# Patient Record
Sex: FEMALE | Race: BLACK OR AFRICAN AMERICAN | Employment: OTHER | URBAN - METROPOLITAN AREA
[De-identification: names, ages, dates, MRNs, and addresses within clinical notes are randomized per-mention and may not be internally consistent; named-entity substitution may affect disease eponyms.]

---

## 2020-09-04 ENCOUNTER — TRANSFERRED RECORDS (OUTPATIENT)
Dept: HEALTH INFORMATION MANAGEMENT | Facility: CLINIC | Age: 80
End: 2020-09-04

## 2020-09-05 ENCOUNTER — TRANSFERRED RECORDS (OUTPATIENT)
Dept: HEALTH INFORMATION MANAGEMENT | Facility: CLINIC | Age: 80
End: 2020-09-05

## 2020-09-06 ENCOUNTER — TRANSFERRED RECORDS (OUTPATIENT)
Dept: HEALTH INFORMATION MANAGEMENT | Facility: CLINIC | Age: 80
End: 2020-09-06

## 2020-09-07 ENCOUNTER — TRANSFERRED RECORDS (OUTPATIENT)
Dept: HEALTH INFORMATION MANAGEMENT | Facility: CLINIC | Age: 80
End: 2020-09-07

## 2020-09-08 ENCOUNTER — TRANSFERRED RECORDS (OUTPATIENT)
Dept: HEALTH INFORMATION MANAGEMENT | Facility: CLINIC | Age: 80
End: 2020-09-08

## 2020-09-12 ENCOUNTER — TRANSFERRED RECORDS (OUTPATIENT)
Dept: HEALTH INFORMATION MANAGEMENT | Facility: CLINIC | Age: 80
End: 2020-09-12

## 2020-09-13 ENCOUNTER — TRANSFERRED RECORDS (OUTPATIENT)
Dept: HEALTH INFORMATION MANAGEMENT | Facility: CLINIC | Age: 80
End: 2020-09-13

## 2020-10-07 ENCOUNTER — OFFICE VISIT (OUTPATIENT)
Dept: FAMILY MEDICINE | Facility: CLINIC | Age: 80
End: 2020-10-07
Payer: MEDICARE

## 2020-10-07 VITALS
SYSTOLIC BLOOD PRESSURE: 148 MMHG | TEMPERATURE: 98.4 F | HEART RATE: 80 BPM | RESPIRATION RATE: 15 BRPM | OXYGEN SATURATION: 98 % | DIASTOLIC BLOOD PRESSURE: 72 MMHG

## 2020-10-07 DIAGNOSIS — Z13.220 SCREENING FOR LIPID DISORDERS: ICD-10-CM

## 2020-10-07 DIAGNOSIS — R26.81 GAIT INSTABILITY: ICD-10-CM

## 2020-10-07 DIAGNOSIS — E11.59 TYPE 2 DIABETES MELLITUS WITH OTHER CIRCULATORY COMPLICATION, UNSPECIFIED WHETHER LONG TERM INSULIN USE (H): ICD-10-CM

## 2020-10-07 DIAGNOSIS — Z23 NEED FOR VACCINATION: Primary | ICD-10-CM

## 2020-10-07 PROBLEM — Z95.0 CARDIAC PACEMAKER IN SITU: Status: ACTIVE | Noted: 2020-10-07

## 2020-10-07 PROBLEM — R00.1 BRADYCARDIA: Status: ACTIVE | Noted: 2020-10-07

## 2020-10-07 LAB
BUN SERPL-MCNC: 14 MG/DL (ref 7–30)
CALCIUM SERPL-MCNC: 9.1 MG/DL (ref 8.5–10.4)
CHLORIDE SERPLBLD-SCNC: 102 MMOL/L (ref 94–109)
CHOLEST SERPL-MCNC: 203 MG/DL (ref 0–200)
CHOLEST/HDLC SERPL: 3.8 {RATIO} (ref 0–5)
CO2 SERPL-SCNC: 28 MMOL/L (ref 20–32)
CREAT SERPL-MCNC: 1.1 MG/DL (ref 0.6–1.3)
CREAT UR-MCNC: 172 MG/DL
EGFR CALCULATED (BLACK REFERENCE): 61.8
EGFR CALCULATED (NON BLACK REFERENCE): 51.1
FASTING SPECIMEN: YES
GLUCOSE SERPL-MCNC: 77 MG/DL (ref 60–99)
HBA1C MFR BLD: 6.8 % (ref 4.1–5.7)
HDLC SERPL-MCNC: 53 MG/DL
LDLC SERPL CALC-MCNC: 110 MG/DL (ref 0–129)
MICROALBUMIN UR-MCNC: 234 MG/L
MICROALBUMIN/CREAT UR: 136.05 MG/G CR (ref 0–25)
POTASSIUM SERPL-SCNC: 4.3 MMOL/L (ref 3.4–5.3)
SODIUM SERPL-SCNC: 142 MMOL/L (ref 137.3–146.3)
TRIGL SERPL-MCNC: 198 MG/DL (ref 0–150)
VLDL-CHOLESTEROL: 40 (ref 7–32)

## 2020-10-07 RX ORDER — SIMVASTATIN 40 MG
40 TABLET ORAL AT BEDTIME
COMMUNITY
End: 2020-12-02

## 2020-10-07 RX ORDER — ALENDRONATE SODIUM 70 MG/1
70 TABLET ORAL
COMMUNITY
End: 2021-03-16

## 2020-10-07 RX ORDER — CARVEDILOL 12.5 MG/1
12.5 TABLET ORAL 2 TIMES DAILY WITH MEALS
COMMUNITY
End: 2020-10-29 | Stop reason: ALTCHOICE

## 2020-10-07 RX ORDER — FAMOTIDINE 20 MG/1
20 TABLET, FILM COATED ORAL 2 TIMES DAILY
COMMUNITY
End: 2022-03-28

## 2020-10-07 RX ORDER — INSULIN LISPRO 100 [IU]/ML
INJECTION, SOLUTION INTRAVENOUS; SUBCUTANEOUS
COMMUNITY
End: 2020-10-13 | Stop reason: ALTCHOICE

## 2020-10-07 RX ORDER — GLIMEPIRIDE 2 MG/1
2 TABLET ORAL
COMMUNITY
End: 2020-10-13 | Stop reason: ALTCHOICE

## 2020-10-07 RX ORDER — AMLODIPINE BESYLATE 10 MG/1
10 TABLET ORAL DAILY
COMMUNITY
End: 2020-12-02

## 2020-10-07 SDOH — HEALTH STABILITY: MENTAL HEALTH: HOW MANY STANDARD DRINKS CONTAINING ALCOHOL DO YOU HAVE ON A TYPICAL DAY?: NOT ASKED

## 2020-10-07 SDOH — HEALTH STABILITY: MENTAL HEALTH: HOW OFTEN DO YOU HAVE A DRINK CONTAINING ALCOHOL?: NEVER

## 2020-10-07 SDOH — HEALTH STABILITY: MENTAL HEALTH: HOW OFTEN DO YOU HAVE 6 OR MORE DRINKS ON ONE OCCASION?: NEVER

## 2020-10-07 NOTE — NURSING NOTE
Prior to immunization administration, verified patients identity using patient s name and date of birth. Please see Immunization Activity for additional information.     Screening Questionnaire for Adult Immunization    Are you sick today?   No   Do you have allergies to medications, food, a vaccine component or latex?   No   Have you ever had a serious reaction after receiving a vaccination?   No   Do you have a long-term health problem with heart, lung, kidney, or metabolic disease (e.g., diabetes), asthma, a blood disorder, no spleen, complement component deficiency, a cochlear implant, or a spinal fluid leak?  Are you on long-term aspirin therapy?   No   Do you have cancer, leukemia, HIV/AIDS, or any other immune system problem?   No   Do you have a parent, brother, or sister with an immune system problem?   No   In the past 3 months, have you taken medications that affect  your immune system, such as prednisone, other steroids, or anticancer drugs; drugs for the treatment of rheumatoid arthritis, Crohn s disease, or psoriasis; or have you had radiation treatments?   No   Have you had a seizure, or a brain or other nervous system problem?   No   During the past year, have you received a transfusion of blood or blood    products, or been given immune (gamma) globulin or antiviral drug?   No   For women: Are you pregnant or is there a chance you could become       pregnant during the next month?   No   Have you received any vaccinations in the past 4 weeks?   No     Immunization questionnaire answers were all negative.        Per orders of Dr. Velazquez, injection of Pneumovax 23 and flu given by Nicole Kline MA. Patient instructed to remain in clinic for 15 minutes afterwards, and to report any adverse reaction to me immediately.       Screening performed by Nicole Kline MA on 10/7/2020 at 11:51 AM.

## 2020-10-07 NOTE — PROGRESS NOTES
SUBJECTIVE:   Ashley Lynn is a 79 year old female who presents to clinic today to discuss the following problem(s).    New Patient: Patient is here with her son. She previously lived in [place]. Recently, she had an episode that landed her in the hospital down there, where is was determined that her Type 2 diabetes was poorly controlled and where she was discovered to have a heart rate that dropped down into the high 20s/low 30s. She ended up getting a pacemaker placed. Her son, who lives here in [place], traveled down to be with her and has now brought her here to [place] to live with him and so he can take care of her. Her son admits he doesn't know much about managing diabetes, but he wants to learn    Diabetes  - med regimen is glimepiride 2mg every morning,   - son is trying to learn what to cook and feed his mom to keep her blood sugars in a good range, feels like mostly AM sugars are around 100  - upon discharge from the hospital patient was also given humalog kwikpen; Rx notes 3 units to be given TID before meals, but patient's son reports they were told only to use the insulin if they see her BG above 190  - patient states she used to take metformin, but son reports that the hospital told him metformin was a terrible medicine and that they never prescribe it, so patient is not currently taking this med  - patient does report she does experience episodes of hot flash like symptoms with nausea, and son does report he has seen at least one BG of 60  - no previous BMP, A1c, lipid panel or albumin/Cr ratio in patient's chart    Heart failure  - recently place pacemaker as noted above for severe bradycardia  - patient denies any chest tightness, shortness of breath or palpitations  - she does report previous episodes of significant bilateral LE edema, but not since pacemaker placement  - son reports they do have an appointment scheduled with cardiology [place] in the [place] coming up    Misc.  -  "patient is currently in a wheelchair due to instability  - son is requesting a shower chair  - son is also requesting a \"walker with wheels and a chair so she can get around and sit when she gets tired\"   - son currently is home doing all the care taking for his mom, but thinks he may eventually need some in home care assistance when he goes back to work      ROS:   CONSTITUTIONAL: Episodes of sweats warmth as noted above. NEGATIVE for chills, fatigue, fever and weight loss  EYES: NEGATIVE for vision changes and eye pain  RESP: NEGATIVE for cough, SOB/dyspnea and wheezing  CV: Pacemaker recently placed as noted above. NEGATIVE for chest pain/chest pressure, dyspnea on exertion  GI: NEGATIVE for abdominal pain, diarrhea, dysphagia and nausea  : NEGATIVE for dysuria, frequency and hematuria  MUSCULOSKELETAL: Unstable gait at baseline  INTEGUMENTARY/SKIN: NEGATIVE for new lesions and pruritis  NEURO: NEGATIVE for dizziness/lightheadedness and paresthesias   PSYCHIATRIC: NEGATIVE    Today's PHQ-2:  PHQ-2 ( 1999 Pfizer) 10/7/2020   Q1: Little interest or pleasure in doing things 0   Q2: Feeling down, depressed or hopeless 1   PHQ-2 Score 1       Past Medical History:   Diagnosis Date     Cardiac pacemaker in situ 10/7/2020     Type 2 diabetes mellitus with other circulatory complication, unspecified whether long term insulin use (H) 10/7/2020     Past Surgical History:   Procedure Laterality Date     IMPLANT PACEMAKER       History reviewed. No pertinent family history.  Social History     Tobacco Use     Smoking status: Never Smoker     Smokeless tobacco: Never Used   Substance Use Topics     Alcohol use: Never     Frequency: Never     Binge frequency: Never     Drug use: None     Social History     Social History Narrative     Not on file       Current Outpatient Medications   Medication     alendronate (FOSAMAX) 70 MG tablet     amLODIPine (NORVASC) 10 MG tablet     carvedilol (COREG) 12.5 MG tablet     famotidine " (PEPCID) 20 MG tablet     glimepiride (AMARYL) 2 MG tablet     insulin lispro (HUMALOG KWIKPEN) 100 UNIT/ML (1 unit dial) KWIKPEN     Multiple Vitamins-Minerals (MULTIVITAMIN ADULT PO)     simvastatin (ZOCOR) 40 MG tablet     No current facility-administered medications for this visit.      I have reviewed the patient's past medical, surgical, family, and social history.     OBJECTIVE:   BP (!) 148/72   Pulse 80   Temp 98.4  F (36.9  C) (Skin)   Resp 15   SpO2 98%     Constitutional: thin, appears stated age  Eyes: conjunctivae without erythema, sclera anicteric.   Cardiac: regular rate and rhythm, normal S1/S2, no murmur/rubs/gallops  Respiratory: lungs clear to auscultation bilaterally, normal work of breathing, no wheezes/crackles  Skin: no rashes, lesions, or wounds  Psych: affect is full and appropriate, speech is fluent and non-pressured    ASSESSMENT AND PLAN:     Ashley was seen today for physical and diabetes.    Diagnoses and all orders for this visit:    Need for vaccination  -     Pneumococcal vaccine 23 valent PPSV23  (Pneumovax) [08002]  -     C RIV4 (FLUBLOK) VACCINE RECOMBINANT DNA PRSRV ANTIBIO FREE, IM    Screening for lipid disorders  -     Lipid Panel ([place])  -     Basic Metabolic Panel ([place])    Gait instability  -     Miscellaneous Order for DME - ONLY FOR DME    Type 2 diabetes mellitus with other circulatory complication, unspecified whether long term insulin use (H)  -     AMBULATORY ADULT DIABETES EDUCATOR REFERRAL; Future  -     Hemoglobin A1c ([place])  -     Albumin Random Urine Quantitative with Creat Ratio    Will refer to diabetic educator as noted above. Not entirely clear why metformin was stopped unless patient has significant kidney failure at baseline, which is possible. Will hold off on medication changes at this point, pending lab results, but would prefer to put patient back on metformin if possible, especially given reports of what sound like hypoglycemic  episodes.     Patient has listed medications for hypertension and possibly A-fib, as well as GERD, possibly HLD and osteopenia/porosis. Time constraints did not allow me to review full medical history and address these issues today. Son informs me they are meeting with a cardiologist soon.  Encouraged patient to return at her convenience to address further issues at her earliest convenience.     Issues noted above with respects to medical equipment: I have ordered the shower chair. I will [facility], for any help she can provide in helping support patient and her son with this transition.       Nicholas Velazquez MD  [facility]  10/07/2020, 10:58 AM

## 2020-10-07 NOTE — Clinical Note
Bruce Guardado,  I met this radha lady and her son today. They could really use some help in terms of getting her plugged in with resources. Not sure what we can do or offer, but if possible, could you reach out to either Ashley or her son? Let me know if you have any questions about this.  Thanks,  Nicholas

## 2020-10-07 NOTE — NURSING NOTE
79 year old  Chief Complaint   Patient presents with     Physical     establish      Diabetes       Blood pressure (!) 143/79, pulse 80, temperature 98.4  F (36.9  C), temperature source Skin, resp. rate 15, SpO2 98 %. There is no height or weight on file to calculate BMI.  There is no problem list on file for this patient.      Wt Readings from Last 2 Encounters:   No data found for Wt     BP Readings from Last 3 Encounters:   10/07/20 (!) 143/79         Current Outpatient Medications   Medication     alendronate (FOSAMAX) 70 MG tablet     amLODIPine (NORVASC) 10 MG tablet     carvedilol (COREG) 12.5 MG tablet     famotidine (PEPCID) 20 MG tablet     glimepiride (AMARYL) 2 MG tablet     insulin lispro (HUMALOG KWIKPEN) 100 UNIT/ML (1 unit dial) KWIKPEN     Multiple Vitamins-Minerals (MULTIVITAMIN ADULT PO)     simvastatin (ZOCOR) 40 MG tablet     No current facility-administered medications for this visit.        Social History     Tobacco Use     Smoking status: Never Smoker     Smokeless tobacco: Never Used   Substance Use Topics     Alcohol use: Never     Frequency: Never     Binge frequency: Never     Drug use: None       Health Maintenance Due   Topic Date Due     DEXA  1940     ADVANCE CARE PLANNING  1940     DTAP/TDAP/TD IMMUNIZATION (1 - Tdap) 12/08/1966     LIPID  12/08/1986     ZOSTER IMMUNIZATION (1 of 2) 12/08/1991     MEDICARE ANNUAL WELLNESS VISIT  12/08/2006     FALL RISK ASSESSMENT  12/08/2006     Pneumococcal Vaccine: 65+ Years (1 of 1 - PPSV23) 12/08/2006     INFLUENZA VACCINE (1) 09/01/2020       No results found for: PAP      October 7, 2020 10:48 AM

## 2020-10-08 NOTE — TELEPHONE ENCOUNTER
Action    Action Taken 10-8: called number and lvm for call back. Pt was seen in [place], looks to have had pacemaker placed recently. Will reach out to hospital to get records directly from them. Waiting on call back to find what hospital it was, cannot locate in [place]. Patient also states bringing records.  10-8: Pt's son called back, pt was seen at [facility] in [place], [place]. Seen by cardiologist: Dr. Faiza Conrad at [facility]. Had pacemaker placed 9-5-20 at [facility]. Pt's son said he's bringing all her paper records, told him I will reach out to [facility]  10-8: Requested from [facility]:    Op report - pacemaker    Hospital discharge summary    Cardiology office notes    EKG Strips    Cardiac imaging - please send images via [employer] account number listed above   9-2020    2020    2020    2020    2020     10-12: received the following images from [facility] in [place], [place]:  cxr 9-4-20  Echo 9-4-20  Cardiac cath 9-4-20  EP study 9-5-20  cxr 9-5-20  cxr 9-6-20  US kidney 9-8-20

## 2020-10-09 ENCOUNTER — TELEPHONE (OUTPATIENT)
Dept: FAMILY MEDICINE | Facility: CLINIC | Age: 80
End: 2020-10-09

## 2020-10-09 ENCOUNTER — PATIENT OUTREACH (OUTPATIENT)
Dept: FAMILY MEDICINE | Facility: CLINIC | Age: 80
End: 2020-10-09

## 2020-10-09 NOTE — TELEPHONE ENCOUNTER
"Dr. Velazquez - see below and let me know if you agree with this plan or further advice.   ++++++++++++++++++++++++++++  Patient's son is caller, he reports calling EMS due to pt \"passing out\". EMS checked her out, /72, EKG normal and BS was 212 and EMS not concerned with that. She was not transported to hospital. He reports pt new to [place] and was seen at initial visit on 10/7/20. He admits to being nervous about using insulin, and administered it once so far. Blood sugar readings are typically 110-200 and noted a drop in BS to 71 and 81 over past 2 days. Insulin is reported as a vial and given subcutaneous with needle/syringe. There appears to be no formal education related to diabetes management. Pt also on glimepride 4 mg daily.     Plan:     I advised son to \"hold\" insulin until further notice, OK for BS to be in 200's for time being.    Continue to check blood sugars at least once daily, keep track of numbers    Schedule an in-clinic visit with Kelly Gillespie, PharmD, bring in all medications and expect education on diabetes management and medications. Next Tuesday would be ideal.    Will route to Dr. Velazquez and call son back today.    Maryuri Melton RN  10/09/20  11:12 AM      "

## 2020-10-09 NOTE — TELEPHONE ENCOUNTER
Called pt and son Khang to discuss resource options to assist Khang in caring for pt.  LM for Khang to return call.  Khang called back and was more immediately concerned with pt's blood sugars and had multiple questions.  Forwarded Khang to Triage nurse.  Will follow up for resources on Monday.    JUANITO Diaz

## 2020-10-09 NOTE — TELEPHONE ENCOUNTER
This sounds good. I previously referred them to a diabetic educator but it doesn't look like they have followed up with that yet. I would love to see Ashley meet with Kelly early next week. Based on her most recent A1c, I think we might be able to manage her diabetes just with metformin. That would be ideal, I think, for avoiding hypoglycemic episodes as well as cardiovascular protection. I advised patient's son not to be overly concerned about sugars in the 200s over the weekend as I am more interested in avoiding low sugars. Patient's son is planning on calling to schedule an appointment with Kelly for early next week. I will route patient's chart to Kelly for heads up.    Nicholas Velazquez MD  11:32 AM, October 9, 2020

## 2020-10-12 ENCOUNTER — PATIENT OUTREACH (OUTPATIENT)
Dept: FAMILY MEDICINE | Facility: CLINIC | Age: 80
End: 2020-10-12

## 2020-10-12 NOTE — TELEPHONE ENCOUNTER
Called pt's son to discuss DME/care needs at home.  Pt currently only has Medicare Insurance. Son discussed that he was going to apply for medical assistance.  Encouraged him to do that as it will open up coverage for DME, possibility of CADI/EW/PCA waivers.  Discussed that family can be considered PCAs and get paid throug the PCA waiver.  Son stated he would apply today.  I will check back in 1 week to see how things are going and to discuss needs in the interim while MA is pending.    KASSIE DiazSW

## 2020-10-13 ENCOUNTER — VIRTUAL VISIT (OUTPATIENT)
Dept: FAMILY MEDICINE | Facility: CLINIC | Age: 80
End: 2020-10-13
Payer: MEDICARE

## 2020-10-13 ENCOUNTER — PATIENT OUTREACH (OUTPATIENT)
Dept: FAMILY MEDICINE | Facility: CLINIC | Age: 80
End: 2020-10-13

## 2020-10-13 DIAGNOSIS — E11.59 TYPE 2 DIABETES MELLITUS WITH OTHER CIRCULATORY COMPLICATION, UNSPECIFIED WHETHER LONG TERM INSULIN USE (H): Primary | ICD-10-CM

## 2020-10-13 RX ORDER — FLASH GLUCOSE SCANNING READER
EACH MISCELLANEOUS
Qty: 1 EACH | Refills: 0 | Status: SHIPPED | OUTPATIENT
Start: 2020-10-13 | End: 2024-08-27

## 2020-10-13 RX ORDER — FLASH GLUCOSE SENSOR
KIT MISCELLANEOUS
Qty: 2 EACH | Refills: 11 | Status: SHIPPED | OUTPATIENT
Start: 2020-10-13 | End: 2021-03-04

## 2020-10-13 NOTE — PROGRESS NOTES
SUBJECTIVE:  Ashley is a 79 year old female here for a follow up visit for Medication Therapy Management Services. Primary care provider is Nicholas Velazquez. Was referred by her provider for   Chief Complaint   Patient presents with     Medication Therapy Management   . Ashley brought medications to the visit: no. Patient completed this visit via telephone call.    Main concern today is low blood sugar levels.  Concerns with medications: yes, Humalog: the patient's son is concerned about using Humalog in general. He is concerned about her blood sugar levels and worries this medication will drop her too low.     Khang states that the day of  for a family member in [place], Ashley collapsed and went to the ER where a pace maker was placed on 20.    Overall Adherence:Patient reports taking medication 2 times each day. The patient's son helps manage the patient's medications and reports giving them as follows:   AM: glimeperide 2mg, amlodipine 10 mg, carvedilol 12.5 mg  PM: carvedilol 12.5 mg  HS: simvastatin 40 mg    DM2 - is taking glimepiride 2 mg once daily and has an RX for Humalog with the sliding scale as listed below. The son states that he is concerned about the patient's low/high blood sugar levels and is hesitant to give the patient insulin. The son recalled one episode where the patient's BG was 212 and injected 5 units of Humalog. Khang stated that Ashley has reported episodes of dizziness, fatigue, and sweating throughout the night as well. The patient's BG and BP are recorded each morning by the son. The patient was previously on metformin but this was discontinued following the patient's pace maker placed on 20 in [place]. Reason for discontinuation unknown. Patient interested in CGM since the patient does not like the finger glucose test.    Diet:  Breakfast: honey nut cheerios, banana, strawberries, grapes, plum juice, low-fat cheese on toast  - snacks between meals: organic peanut  butter on whole wheat bread, unsweetened tea    Sliding Scale Humalog insulin per son:  200-250 = 5 units  251-300 = 7 units  301-350 = 10 units  351-400 = 12 units    Blood glucose results:  10/06: 66 (fasting), 67 (after breakfast), 61 (before lunch), 97 (after lunch), 107 (before bed)    10/07: 71(fasting 8 am), 56 (5 pm before dinner), 88 (530 pm after dinner), 54 (815 pm), 81 (850 pm)    10/13: - 112 (AM fasting), 115 (before breakfast), 117 (am reading - unclear of time)    HTN - is taking amlodipine 10 mg once daily and carvedilol 12.5 mg twice daily. Denies dizziness, lightheadedness, or signs of orthostatic hypotension.    The following BP readings are from the past week. Son states that he normally measures these the first thing in the AM before medications. The son also reports that all medications on the list were started after the patient's 09/04 hospitalizations. Son also notes that the patient has some ankle swelling this morning but is not on any diuretics    10/06 : 143/73  10/07: 143/77  10/08: no check  10/09: 131/72  10/10: 124/69  10/11: no check  10/12: 132/74  10/13: 130/71    Hyperlipidemia - is taking simvastatin 40 mg. Did not assess for any muscle pain or weakness with this medication at this time.    Osteoporosis - Alendronate: son states the patient hasn't used since they moved 2 weeks ago. Patient is unsure of how long she's been on this med and can't remember the last bone scan/DEXA either. Normally taking this medication once a week.     Emesis/Reflux - Pepcid: patient was experiencing 1 episode of vomiting following salad from the airport in [place]. Khang (son) was unsure where this episode came from but thinks it may be from the salad dressing. He was unsure if this medication would help with the patients episode of emesis or not.    Social history - not assessed at this time.      OBJECTIVE:   Patient Care Team:  Nicholas Velazquez MD as PCP - General (Family Practice)  Current  Outpatient Medications   Medication Sig Dispense Refill     alendronate (FOSAMAX) 70 MG tablet Take 70 mg by mouth every 7 days       amLODIPine (NORVASC) 10 MG tablet Take 10 mg by mouth daily       carvedilol (COREG) 12.5 MG tablet Take 12.5 mg by mouth 2 times daily (with meals)       Continuous Blood Gluc  (FREESTYLE ANURAG 14 DAY READER) STEVIE Use to read blood sugars as per 's instructions. 1 each 0     Continuous Blood Gluc Sensor (FREESTYLE ANURAG 14 DAY SENSOR) MISC Change every 14 days. 2 each 11     famotidine (PEPCID) 20 MG tablet Take 20 mg by mouth 2 times daily       simvastatin (ZOCOR) 40 MG tablet Take 40 mg by mouth At Bedtime       Multiple Vitamins-Minerals (MULTIVITAMIN ADULT PO)          No Known Allergies  Past Medical History:   Diagnosis Date     Cardiac pacemaker in situ 10/7/2020     Type 2 diabetes mellitus with other circulatory complication, unspecified whether long term insulin use (H) 10/7/2020        There were no vitals filed for this visit.  There is no height or weight on file to calculate BMI.    Last Comprehensive Metabolic Panel:  Sodium   Date Value Ref Range Status   10/07/2020 142.0 137.3 - 146.3 mmol/L Final     Potassium   Date Value Ref Range Status   10/07/2020 4.3 3.4 - 5.3 mmol/L Final     Chloride   Date Value Ref Range Status   10/07/2020 102.0 94.0 - 109.0 mmol/L Final     Carbon Dioxide   Date Value Ref Range Status   10/07/2020 28.0 20.0 - 32.0 mmol/L Final     Glucose   Date Value Ref Range Status   10/07/2020 77.0 60.0 - 99.0 mg/dL Final     Urea Nitrogen   Date Value Ref Range Status   10/07/2020 14.0 7.0 - 30.0 mg/dL Final     Creatinine   Date Value Ref Range Status   10/07/2020 1.1 0.6 - 1.3 mg/dL Final     Calcium   Date Value Ref Range Status   10/07/2020 9.1 8.5 - 10.4 mg/dL Final      BP Readings from Last 3 Encounters:   10/07/20 (!) 148/72       Cholesterol   Date Value Ref Range Status   10/07/2020 203.0 (H) 0.0 - 200.0 Final     HDL  Cholesterol   Date Value Ref Range Status   10/07/2020 53.0 >50.0 Final     LDL Cholesterol Direct   Date Value Ref Range Status   10/07/2020 110.0 0.0 - 129.0 Final     Triglycerides   Date Value Ref Range Status   10/07/2020 198.0 (H) 0.0 - 150.0 Final     Cholesterol/HDL Ratio   Date Value Ref Range Status   10/07/2020 3.8 0.0 - 5.0 Final        ASSESSMENT:  CrCl is around 61 mL/min calculated using Cockcroft-Gault.    HgbA1C goal: <8%: Pt is at goal.  BP goal: <130/80 mmHg: Pt is not at goal.    1. Condition - DM2: Safety - Needs additional monitoring: Medication requires monitoring  The patient is currently prescribed Humalog with a sliding scale. However, when reviewing the BG reported by the son, there is concerns for hypoglycemia based on BG readings and patient reported symptoms. Given low BG readings, ongoing need for insulin is unlikely.    2. Condition - DM2: Efficacy - Needs additional monitoring: Medication requires monitoring  The patient is also taking glimepiride 2 mg once daily. According to the 2020 ADA guidelines, the patient's A1c goal should be < 7%. However, given the patient's age and co-morbidities, the patient may benefit from a less stringent A1c goal of < 7.5% of <8%. The patient's last recorded A1c was 6.8 on 10/07. Patient reported BG levels are also below the goal of FBG  mg/dL also. Because of this, the patient may no longer be indicated for medication therapy. Trial off any BG lowering medication could be useful to provide baseline BG information. Consideration for metformin in the future could be reasonable. Use of CGM would limit finger pokes to the patient and give more robust information on BG levels at various points in time to inform therapy decision making.     3. Condition - HTN: Efficacy - Ineffective medication: More effective medication available  According to the 2017 ACC/AHA guidelines for HTN, the blood pressure goal should be less than 130/80 mmHG. The report BP  readings are currently above goal. However, given the patient's other pertinent concerns of DM management, the patient may benefit from future optimization of BP medications.    4. Condition - Osteoporosis: Efficacy - Needs additional monitoring: Medication requires monitoring  The patient is unsure when their last DXA test was completed. Assessment of medical record from [place] will be beneficial to determine ongoing need for bisphosphonate therapy.     5. Condition - Emesis/reflux: No drug therapy problem.   Son reports one episode of emesis at an airport. Unclear whether or not this is food related. This should be evaluated at a future appointment once DM/HT is better managed.     PLAN:  Medications comments/ concerns are:   1. Hold glimepiride for 1 week. -completed   2. Stop using Humalog insulin. -completed   3. Continue to check BG once every morning until the Freestyle Bernice CGM is placed. -completed   4. Rx for Freestyle Bernice sent. -completed   5. Continue to check BP once every morning. -completed   6. Review incoming records for indication of bisphosphonate therapy. -pending     Follow up:  1) follow-up with  for Medical assistance process  2) follow-up Thursday 4pm for CGM placement     Options for treatment and/or follow-up care were reviewed with the patient. Ashley was engaged and actively involved in the decision making process. Ashley verbalized understanding of the options discussed and was satisfied with the final plan. Ashley was given a copy of these recommendations and an up to date medication list in an after visit summary. This report was discussed with Nicholas Velazquez, Student Pharmacist  Kelly Gillespie, PharmD  Medication Management (MTM) Pharmacist  [facility]     ---   Flowsheet completed.  # of medical conditions reviewed: 5  # of medications reviewed: 9  # of DTP identified: 5  Time spent: 45 minutes  Level of service:5         Telephone  "Visit Consent   Patient was verbally read the following and verbal consent was obtained.  \"I understand that I may revoke this request for a phone visit at any time.  This consent will automatically  3 months from the signed date and time.\"    Name person giving consent:  son   Date verbal consent given:  10/13/2020  Time verbal consent given:  9:05 AM    The patient has been notified of following:     \"This telephone visit will be conducted via a call between you and your physician/provider. We have found that certain health care needs can be provided without the need for a physical exam.  This service lets us provide the care you need with a short phone conversation.  If a prescription is necessary we can send it directly to your pharmacy.  If lab work is needed we can place an order for that and you can then stop by our lab to have the test done at a later time.    If during the course of the call the physician/provider feels a telephone visit is not appropriate, you will not be charged for this service.\"     As the provider for this telephone service, I attest that I introduced myself to the patient, provided my credentials, disclosed my location, and determined that, based on a review of the patient's chart and/or a discussion with members of the patient's treatment team, a telephone visit is an appropriate and effective means of providing this service. The patient and I mutually agree that this visit is appropriate for telephone as well.    Originating site (patient location): HOME  Distant site (telephone provider location): [facility]  Telephone start time (include am/pm designation): 9:05 AM  Telephone end time (include am/pm designation):  9:51 AM  Total time: 45 minutes          "

## 2020-10-13 NOTE — TELEPHONE ENCOUNTER
Pt's son Khang called to ask about whether he can be reimbursed by insurance for a continuous glucose monitor he bought for his mom today. I let him know that potentially he could be reimbursed but he would need to speak with her insurance about it.  Pt and son will be in clinic on Thursday, will speak more about it then.    KASSIE DiazSW

## 2020-10-14 ENCOUNTER — TELEPHONE (OUTPATIENT)
Dept: FAMILY MEDICINE | Facility: CLINIC | Age: 80
End: 2020-10-14

## 2020-10-14 NOTE — TELEPHONE ENCOUNTER
Diabetes Education Scheduling Outreach #1:    Call to patient to schedule. Left message with phone number to call to schedule.    Plan for 2nd outreach attempt within 1 week.    Poonam Youngblood  [facility]  Diabetes and Nutrition Scheduling

## 2020-10-15 ENCOUNTER — PATIENT OUTREACH (OUTPATIENT)
Dept: FAMILY MEDICINE | Facility: CLINIC | Age: 80
End: 2020-10-15

## 2020-10-15 ENCOUNTER — OFFICE VISIT (OUTPATIENT)
Dept: FAMILY MEDICINE | Facility: CLINIC | Age: 80
End: 2020-10-15
Payer: MEDICARE

## 2020-10-15 DIAGNOSIS — E11.59 TYPE 2 DIABETES MELLITUS WITH OTHER CIRCULATORY COMPLICATION, UNSPECIFIED WHETHER LONG TERM INSULIN USE (H): Primary | ICD-10-CM

## 2020-10-15 NOTE — TELEPHONE ENCOUNTER
Diabetes Education Scheduling Outreach #2:    Call to patient to schedule. Justin, son, will call us back to schedule.    Poonam Youngblood  [facility]  Diabetes and Nutrition Scheduling

## 2020-10-15 NOTE — PROGRESS NOTES
SUBJECTIVE:   Ashley is a 79 year old female here for a follow up visit for Medication Therapy Management Services. Primary care provider is Nicholas Velazquez. Was referred by her provider for No chief complaint on file.  . Ashley brought medications to the visit: yes. Patient completed this visit in person.      PLAN FROM LAST VISIT:   1. Hold glimepiride for 1 week. -completed   2. Stop using Humalog insulin. -completed   3. Continue to check BG once every morning until the Freestyle Bernice CGM is placed. -completed   4. Rx for Freestyle Bernice sent. -completed   5. Continue to check BP once every morning. -completed   6. Review incoming records for indication of bisphosphonate therapy. -pending     ----  Ashley's MAIN CONCERN TODAY is blood glucose follow-up and CGM placement.  Allergies reviewed: Yes- no changes  Pt reports using the following medical devices: Now starting the Free Style Bernice CGM      HTN - is taking amlodipine 10 mg once daily. Denies dizziness, lightheadedness, chest pain, SOB or falls. Discussed in detail the mechanism of action of each drug.     10/10: 124/69  10/11: 133/79  10/12: 138/71  10/13: 109/62  10/14: 136/66  10/15: 138/72      DM2 - patient has stopped taking all DM medications for the past week. Son states the patient receives breakfast around 7-730 am each day.     10/10: 114  10/11: 112  10/12: 117  10/13: 90  10/14: 114  10/15: 108       Hyperlipidemia - is taking simvastatin 40 mg once daily. Denies muscle aches or pains.      Osteoporosis - the patient has picked up the prescription for alendronate however has not yet started this. Need to review incoming records from previous health system for appropriateness of continuing bisphosphonate.    Social history - not assessed at this visit    OBJECTIVE:   Patient Care Team:  Nicholas Velazquez MD as PCP - General (Family Practice)  Current Outpatient Medications   Medication Sig Dispense Refill     alendronate (FOSAMAX) 70 MG tablet Take  70 mg by mouth every 7 days       amLODIPine (NORVASC) 10 MG tablet Take 10 mg by mouth daily       carvedilol (COREG) 12.5 MG tablet Take 12.5 mg by mouth 2 times daily (with meals)       Continuous Blood Gluc  (FREESTYLE ANURAG 14 DAY READER) STEVIE Use to read blood sugars as per 's instructions. 1 each 0     Continuous Blood Gluc Sensor (FREESTYLE ANURAG 14 DAY SENSOR) MISC Change every 14 days. 2 each 11     famotidine (PEPCID) 20 MG tablet Take 20 mg by mouth 2 times daily       Multiple Vitamins-Minerals (MULTIVITAMIN ADULT PO)        simvastatin (ZOCOR) 40 MG tablet Take 40 mg by mouth At Bedtime       Patient Active Problem List   Diagnosis     Type 2 diabetes mellitus with other circulatory complication, unspecified whether long term insulin use (H)     Gait instability     Bradycardia     Cardiac pacemaker in situ         BP Readings from Last 3 Encounters:   10/07/20 (!) 148/72       Last Comprehensive Metabolic Panel:  Sodium   Date Value Ref Range Status   10/07/2020 142.0 137.3 - 146.3 mmol/L Final     Potassium   Date Value Ref Range Status   10/07/2020 4.3 3.4 - 5.3 mmol/L Final     Chloride   Date Value Ref Range Status   10/07/2020 102.0 94.0 - 109.0 mmol/L Final     Carbon Dioxide   Date Value Ref Range Status   10/07/2020 28.0 20.0 - 32.0 mmol/L Final     Glucose   Date Value Ref Range Status   10/07/2020 77.0 60.0 - 99.0 mg/dL Final     Urea Nitrogen   Date Value Ref Range Status   10/07/2020 14.0 7.0 - 30.0 mg/dL Final     Creatinine   Date Value Ref Range Status   10/07/2020 1.1 0.6 - 1.3 mg/dL Final     Calcium   Date Value Ref Range Status   10/07/2020 9.1 8.5 - 10.4 mg/dL Final          ASSESSMENT:    HgbA1C goal: <8%. Pt is at goal.    1. Condition - DM: Indication - Unnecessary medication: Nonmedication therapy more appropriate  The patient's last A1c of 6.8 on 10/07 confirms that the patient is currently at goal. However, to assess the trend of the blood glucose levels,  recommend continuing the CGM to assess the need for future DM medication therapy going forward.     2. Condition - Hypertension: Efficacy - medications requires monitoring.  The goal BP for our patient is < 130/80 mmHG per the 2017 ACC/AHA guidelines. The patient reported blood pressures are not consistent, however they are near goal. Because of this, the patient may require optimization in the future and further monitoring would be beneficial to verify appropriate medication choice.    3. Condition - Hyperlipidemia: No drug therapy problem  The patient is currently taking a moderate intensity which is indicated for the patient given their history of DM and HTN according to the 2018 ACC/AHA guidelines for blood cholesterol.     4. Condition - Osteoporosis: Indication - Needs additional medication: Preventative therapy  The patient may be indicated for therapy at this time. However, without reviewing the patient's medical records from [place] it is unclear whether or not she needs to continue therapy. Need to be reassessed in the future depending on her most recent DXA score and when her other pertinent conditions have been resolved.       PLAN:  Medications comments/ concerns are:   1. Placed Freestyle Bernice CGM sensor. Educated patient and son on proper use. -completed   2. Continue to hold glimepiride. -completed   3. Review incoming health records, specifically for osteoporosis management. -pending     Follow up:  In person visit in 2 weeks to review the Free Style Bernice CGM    Options for treatment and/or follow-up care were reviewed with the patient. Ashley was engaged and actively involved in the decision making process. Ashley verbalized understanding of the options discussed and was satisfied with the final plan. Ashley was given a copy of these recommendations and an up to date medication list in an after visit summary. This report was sent to Nicholas Velazquez - PD4  Student Pharmacist  [place]  [facility]    Kelly Gillespie, [place]  Medication Management (MTM) Pharmacist  [facility]     ---   Flowsheet completed.  # of medical conditions reviewed: 4  # of medications reviewed: 5  # of DTP identified: 3  Time spent: 30 minutes  Level of service:3

## 2020-10-16 NOTE — TELEPHONE ENCOUNTER
Pt and her son in clinic to meet with pharmantoinette re: DMII management.  Post meeting w/pharmd I met with pt and her son to discuss insurance questions.  Pt has applied for medical ssistance, not approved yet, but had it in [place] and so likely is eligible.  Son wondering how to be reimbursed for medical assistance. Encouraged him to call [facility] and ask about how to submit medical bills for reimbursement.  Son stated that they need to schedule an assessment for PCA services. Encouraged him to continue to follow up on the county to get assessment scheduled and ask the  questions about how to become his mother's PCA.  Son indicated he would do this.  I will call him in two weeks to see how things are going.    Debby Liao, KASSIESW

## 2020-10-26 ENCOUNTER — PATIENT OUTREACH (OUTPATIENT)
Dept: EDUCATION SERVICES | Facility: CLINIC | Age: 80
End: 2020-10-26
Payer: MEDICARE

## 2020-10-26 DIAGNOSIS — E11.59 TYPE 2 DIABETES MELLITUS WITH OTHER CIRCULATORY COMPLICATION, UNSPECIFIED WHETHER LONG TERM INSULIN USE (H): Primary | ICD-10-CM

## 2020-10-26 PROCEDURE — G0108 DIAB MANAGE TRN  PER INDIV: HCPCS | Mod: 95

## 2020-10-26 NOTE — PATIENT INSTRUCTIONS
MY DIABETES TODAY:    1)  Goal A1C is under <7.0  Mine is:      Lab Results   Component Value Date    A1C 6.8 10/07/2020       2)  Goal LDL (bad cholesterol) under 100  (measured at least yearly)- I am currently at:   Lab Results   Component Value Date    .0 10/07/2020       3)  Goal blood pressure under 130/80- mine was Data Unavailable today    Care Plan:  Meal Plan Recommendation: eat 3 meals a day, have small snacks between meals, if needed, use portion control and Aim for 2-3 (30-45 gm) carb servings at meals and 0-1 (0-15 gm) carb servings at snacks    Follow up:  Call ([phone]), e-mail ([email or web address]), or send MyChart message with questions, concerns or if follow-up is needed.  Follow-up for annual diabetes education review in 1 year or sooner, if needed.     Bring blood glucose meter and logbook with you to all doctor and follow-up appointments.     [facility] for the [facility]:  For Your Diabetes or Nutrition Education Appointments Call:  [phone]   For Diabetes or Nutrition Related Questions:   [phone]  Send MyChart Message   If you need a medication refill please contact your pharmacy. Please allow 3 business days for your refills to be completed.  Patient Education     Diabetes: Learning About Serving and Portion Sizes     A good rule of thumb: Devote half your plate to vegetables and green salad. Split the other half between protein and starchy carbohydrates. Fruit makes a good dessert.   Servings and portions. What s the difference? These terms can be very confusing. But learning to measure serving sizes can help you figure out how many carbohydrates ( carbs ) and other foods you eat each day. They are also powerful tools for managing your weight.  Servings and portions  Many different words are used to describe amounts of food. If your healthcare provider uses a term you re not sure of, don t be afraid to ask. It helps  to know the difference between servings and portions:    A serving size is a fixed size. Food producers use this term to describe their products. For example, the label on a cereal box could say that 1 cup of dry cereal = 1 serving.    A portion (also called a  helping ) is how much you eat or how much you put on your plate at a meal. For example, you might eat 2 cups of cereal at breakfast.  Watching serving sizes  The portion you choose to eat (such as 2 cups of cereal) may be more than 1 serving as listed on the food label (such as 1 cup of cereal). That s why it helps to measure or weigh the food you eat. Because the food label values are based on servings, you ll need to know how many servings you eat at 1 sitting.  When you re planning for a snack or a meal, keep servings in mind. If you don t have measuring cups or a scale handy, there are ways to  eyeball  serving sizes, such as comparing your food to the size of your hand (see pictures above).       1 tablespoon is about the size of your thumb.  1 teaspoon is about the size of the tip of your thumb.  1 serving of meat or poultry is the size of the palm of your hand.   Managing portion sizes  If your weight is a concern, reducing your portions can help. You can eat more than one serving of a food at once. But to keep from eating too much at one meal, learn how to manage your portions. A portion is the amount of each type of food on your plate.   Date Last Reviewed: 3/1/2016    1298-5479 [employer]. [address] [place] [address], [place] [address]. All rights reserved. This information is not intended as a substitute for professional medical care. Always follow your healthcare professional's instructions.           Patient Education     Diabetes: Shopping for and Making Meals    Having diabetes doesn t mean you have to shop in a special aisle or look for special foods. But you will need to make healthy food choices. Comparing items and reading food  "labels is key. This can help you find the healthiest foods for you and your family.  Comparing items  When you shop, compare items to find the best ones for your needs. Keep these facts in mind:     No sugar added  does not mean a product is sugar-free.    \"Sugar-free\" means less than 1/2 gram (g) of sugar per serving.     Fat free  means less than 1/2 g of fat per serving. This does not necessarily mean the product is low in calories.     Low fat  means 3 g fat or less per serving.  Reduced fat  or  less fat  means 25% less fat than the regular version. Some of this fat may be saturated or trans fat. And the calories per serving may be similar to the regular version.  Making small changes  Don t try to change all of your eating habits at once. Here are some ideas to start with:    Try fat-free or low-fat cheese, milk, and yogurt. Also try leaner cuts of meat. This will help you cut down on saturated fat.    Try whole-grain breads, brown rice, and whole-wheat pasta.    Load up on fresh or frozen vegetables. If you buy canned, choose low-sodium vegetables.    Stay away from processed foods as much as possible. They tend to be low in fiber and high in trans fats and salt.    Try tofu, soy milk, or meat substitutes.?They can help you cut cholesterol and saturated fat out of your diet.  Learning to read food labels  To find healthy foods that help you control blood sugar, learn how to read food labels. Look for the Nutrition Facts label on packaged foods. It will tell you how many servings there are in the package. It will also tell you the amount of carbohydrate, sugar, fat, and fiber in each serving. Then you can decide if the food fits into your meal plan.  Using the food label  So, once you have the food label, what do you do with it? The food label helps in many ways. Use it to:    Compare items. Decide which is the best for your health needs.    Track the number of carbohydrates in your portions.    Figure out how " many servings of a food you can have and still stay within the number of carbohydrates for that meal.  Planning meals  For good blood sugar control, plan what and when you ll eat. Start by making a meal plan that includes all the food groups. Then time your meals and exercise to help keep your blood sugar level steady. You may need to adjust your plan for special situations. But 3 of the best ways to manage your blood sugar are to:    Eat meals and snacks at the same time every day    Eat about the same amount of food    Exercise each day    Eat from all the food groups  The basis of a healthy meal plan is variety (eating many different types of foods). Look for lean meats, fresh fruits and vegetables, whole grains, and low-fat or nonfat dairy products. Eating a wide variety of healthy foods offers the nutrients your body needs. It can also keep you from getting bored with your meal plan.  Reduce liquid sugars  Extra calories from sodas, sports drinks, and fruit drinks make it hard to keep blood sugar in range. Cut as many liquid sugars from your meal plan as you can. This includes most fruit juices. These are often high in natural or added sugar. Instead, take plenty of water and other sugar-free drinks.  Eat less fat  If you need to lose some weight, try to reduce the amount of fat in your diet. This can also help lower your cholesterol level. This can keep blood vessels healthier. Cut fat by using only small amounts of liquid oil for cooking. Read food labels carefully. This can help you stay away from foods with unhealthy trans fats.  Timing your meals  When it comes to blood sugar control, when you eat is as important as what you eat. You may need to eat several small meals spaced evenly during the day. This can help you stay in your target range. So don t skip breakfast or wait until late in the day to get most of your calories. Doing so can make your blood sugar rise too high or fall too low.  Cooking  wisely    Broil, steam, bake, or grill meats and vegetables. Don't rice them.    Flavor foods with vegetable purée, lemon or lime juice, or herb seasonings. Don't use cream-based sauces or sugary glazes.    Remove skin from chicken and turkey before serving.    Look in cookbooks for easy low-fat, low-sugar recipes. When making your normal recipes, cut sugar by 1/2. Cut fat by 1/3.    Date Last Reviewed: 8/1/2016 2000-2019 [employer]. [address] [place] [address]. All rights reserved. This information is not intended as a substitute for professional medical care. Always follow your healthcare professional's instructions.

## 2020-10-26 NOTE — PROGRESS NOTES
Diabetes Self-Management Education & Support  Patient verbally consented to the telephone visit service today: yes    Presents for: Individual review    SUBJECTIVE/OBJECTIVE:  Presents for: Individual review  Accompanied by: Son, Self  Diabetes education in the past 24mo: No  Focus of Visit: Healthy Eating  Diabetes type: Type 2  Disease course: Stable  Other concerns:: None  Cultural Influences/Ethnic Background:  American      Diabetes Symptoms & Complications:     Complications assessed today?: No    Patient Problem List and Family Medical History reviewed for relevant medical history, current medical status, and diabetes risk factors.    Vitals:  There were no vitals taken for this visit.  There is no height or weight on file to calculate BMI.   Last 3 BP:   BP Readings from Last 3 Encounters:   10/07/20 (!) 148/72       History   Smoking Status     Never Smoker   Smokeless Tobacco     Never Used       Labs:  Lab Results   Component Value Date    A1C 6.8 10/07/2020     Lab Results   Component Value Date    GLC 77.0 10/07/2020     Lab Results   Component Value Date    .0 10/07/2020     HDL Cholesterol   Date Value Ref Range Status   10/07/2020 53.0 >50.0 Final   ]  No results found for: GFRESTIMATED  No results found for: GFRESTBLACK  Lab Results   Component Value Date    CR 1.1 10/07/2020     No results found for: MICROALBUMIN    Healthy Eating:  Healthy Eating Assessed Today: Yes  Meals include: Breakfast, Lunch, Dinner  Breakfast: HN or plain cheerios, almond milk, occasionally fruit (1/2 banana or 3 strawberries or 1/2 cup grapes), black coffee w/ pyure sugar substitute  Lunch: salad - chicken, green beans, carrots, occasionally grapes, diet juice or unsweetened tea  Dinner: protein, green salad or green beans, unsweetened tea  Snacks: skinny pop, sarai crackers w/ pb  Beverages: Coffee, Other(sugar-free drinks)  Has patient met with a dietitian in the past?: No    Being Active:  Being Active Assessed  Today: No    Monitoring:  Monitoring Assessed Today: No  Blood Glucose Meter: CGM    Using Freestyle Bernice CGM.  [facility] on Wednesday for data review.  Reports numbers in the low 100's.    Taking Medications:      Taking Medication Assessed Today: Yes  Current Treatments: None, Diet    Problem Solving:  Problem Solving Assessed Today: Yes  Is the patient at risk for hypoglycemia?: No              Reducing Risks:  Reducing Risks Assessed Today: No    Healthy Coping:  Healthy Coping Assessed Today: Yes  Emotional response to diabetes: Ready to learn  Informal Support system:: Children  Stage of change: ACTION (Actively working towards change)  Patient Activation Measure Survey Score:  No flowsheet data found.    Diabetes knowledge and skills assessment:   Patient is knowledgeable in diabetes management concepts related to: Monitoring    Patient needs further education on the following diabetes management concepts: Healthy Eating and Problem Solving    Based on learning assessment above, most appropriate setting for further diabetes education would be: Group class or Individual setting.      INTERVENTIONS:    Education provided today on:  AADE Self-Care Behaviors:  Healthy Eating: carbohydrate counting, consistency in amount, composition, and timing of food intake, portion control and label reading  Problem Solving: high blood glucose - causes, signs/symptoms, treatment and prevention    Opportunities for ongoing education and support in diabetes-self management were discussed.    Pt verbalized understanding of concepts discussed and recommendations provided today.       Education Materials Provided:  Carbohydrate Counting      ASSESSMENT:  Phone visit with patient and son to discuss diet.  Ashley's son is preparing all meals/snacks and wants information on what he should be giving her.  Current diet is providing 10-30 grams carb per meal, 15 grams per snack.  Ashley's son is confused if she should be eating cereal  or fruit.    Recommended 30 (up to 45 gm) carb per meal and 0-15 gm per snack.  Discussed portion sizes of commonly consumed fruits.  Provided info on reading labels for Total Carb.  Recommended adding a carb source to lunch and dinner, such as a fruit or yogurt or whole grain or starchy vegetable.  Current snacks are appropriate.  Ashley is wondering if she can have regular pop.  Advised that sugar-free beverages are best.        Patient's most recent   Lab Results   Component Value Date    A1C 6.8 10/07/2020    is meeting goal of <7.0    PLAN  See Patient Instructions for co-developed, patient-stated behavior change goals.    RICK Younger CDE    Time Spent: 30 minutes  Encounter Type: Individual    Any diabetes medication dose changes were made via the CDE Protocol and Collaborative Practice Agreement with the patient's referring provider. A copy of this encounter was shared with the provider.

## 2020-10-29 ENCOUNTER — OFFICE VISIT (OUTPATIENT)
Dept: FAMILY MEDICINE | Facility: CLINIC | Age: 80
End: 2020-10-29
Payer: MEDICARE

## 2020-10-29 VITALS
RESPIRATION RATE: 16 BRPM | DIASTOLIC BLOOD PRESSURE: 82 MMHG | BODY MASS INDEX: 23.74 KG/M2 | TEMPERATURE: 97.4 F | SYSTOLIC BLOOD PRESSURE: 136 MMHG | HEART RATE: 80 BPM | WEIGHT: 117.75 LBS | HEIGHT: 59 IN | OXYGEN SATURATION: 100 %

## 2020-10-29 DIAGNOSIS — E11.59 TYPE 2 DIABETES MELLITUS WITH OTHER CIRCULATORY COMPLICATION, UNSPECIFIED WHETHER LONG TERM INSULIN USE (H): Primary | ICD-10-CM

## 2020-10-29 DIAGNOSIS — I10 ESSENTIAL HYPERTENSION: ICD-10-CM

## 2020-10-29 DIAGNOSIS — M81.0 AGE-RELATED OSTEOPOROSIS WITHOUT CURRENT PATHOLOGICAL FRACTURE: ICD-10-CM

## 2020-10-29 RX ORDER — LISINOPRIL 10 MG/1
10 TABLET ORAL DAILY
Qty: 30 TABLET | Refills: 3 | Status: SHIPPED | OUTPATIENT
Start: 2020-10-29 | End: 2020-11-04

## 2020-10-29 ASSESSMENT — MIFFLIN-ST. JEOR: SCORE: 923.12

## 2020-10-29 NOTE — PATIENT INSTRUCTIONS
Today, we are going to stop your Coreg and start you on a different medication called Lisinopril. I want you to keep taking your Amlodipine.     You can look at [facility] for an eye exam.     [facility] Center: [phone]    [email or web address]

## 2020-10-29 NOTE — PROGRESS NOTES
"SUBJECTIVE:   Ashley Lynn is a 79 year old female who presents to clinic today to discuss the following problem(s).    DM2  - Ashley was seen by me previously for what was initially intended to be an annual preventive visit, but turned into a more acute assessment of her diabetes. She and her son have since met with Kelly Gillespie, [place]. Ashley now has a CGM in place. Her most recent A1c was 6.8. She is working with her son to manage her blood sugars through diet alone, which seems to be working quite well. Patient is scheduled to meet with Kelly right after we meet to review blood sugars    Other concerns we were not able to address at Ashley's initial visit  # Presumed osteoporosis  - Ashley has been taking fosamax \"for years\"; she's not entirely sure how long  - efforts have been made to try and track down her most recent previous DEXA score from records in [place] but to no avail  - would like to be able to assess how long she has been on the fosamax, the current state of her bone health, and whether or not we should consider going off this medication  # HTN  - cardiac history of recent pacemaker placement  - also, there is a question of possible previous hypoglycemic episodes  - patient's BP readings in clinic have been consistently above therapeutic levels, although patient's son reports pressures have been better at home  - current regimen includes amlodipine and coreg  - patient has previously complained of bilateral LE edema, today her legs look good    ROS:   CONSTITUTIONAL: NEGATIVE for chills, fatigue, fever, sweats and weight loss  EYES: NEGATIVE for diplopia, eye pain and photophobia  RESP: NEGATIVE for cough, hemoptysis, SOB/dyspnea and wheezing  CV: NEGATIVE for chest pain/chest pressure, dyspnea on exertion  GI: NEGATIVE for abdominal pain, diarrhea and nausea  : NEGATIVE for dysuria, frequency and hematuria  MUSCULOSKELETAL: NEGATIVE for arthralgias, cyanosis, or edema  NEURO: NEGATIVE for " "dizziness/lightheadedness, memory problems and paresthesias   PSYCHIATRIC: NEGATIVE    Today's PHQ-2:  PHQ-2 ( 1999 [employer]) 10/29/2020 10/7/2020   Q1: Little interest or pleasure in doing things 0 0   Q2: Feeling down, depressed or hopeless 0 1   PHQ-2 Score 0 1       Past Medical History:   Diagnosis Date     Cardiac pacemaker in situ 10/7/2020     Type 2 diabetes mellitus with other circulatory complication, unspecified whether long term insulin use (H) 10/7/2020     Past Surgical History:   Procedure Laterality Date     IMPLANT PACEMAKER       No family history on file.  Social History     Tobacco Use     Smoking status: Never Smoker     Smokeless tobacco: Never Used   Substance Use Topics     Alcohol use: Never     Frequency: Never     Binge frequency: Never     Drug use: None     Social History     Social History Narrative     Not on file       Current Outpatient Medications   Medication     amLODIPine (NORVASC) 10 MG tablet     Continuous Blood Gluc  (FREESTYLE ANURAG 14 DAY READER) STEVIE     Continuous Blood Gluc Sensor (FREESTYLE ANURAG 14 DAY SENSOR) MISC     famotidine (PEPCID) 20 MG tablet     lisinopril (ZESTRIL) 10 MG tablet     simvastatin (ZOCOR) 40 MG tablet     alendronate (FOSAMAX) 70 MG tablet     Multiple Vitamins-Minerals (MULTIVITAMIN ADULT PO)     No current facility-administered medications for this visit.      I have reviewed the patient's past medical, surgical, family, and social history.     OBJECTIVE:   /82   Pulse 80   Temp 97.4  F (36.3  C) (Skin)   Resp 16   Ht 1.504 m (4' 11.21\")   Wt 53.4 kg (117 lb 12 oz)   SpO2 100%   BMI 23.61 kg/m      Constitutional: well-appearing, appears stated age  Eyes: conjunctivae without erythema, sclera anicteric.   Cardiac: regular rate and rhythm, normal S1/S2, no murmur/rubs/gallops  Respiratory: lungs clear to auscultation bilaterally, normal work of breathing, no wheezes/crackles  Skin: no rashes, lesions, or wounds  Psych: " affect is full and appropriate, speech is fluent and non-pressured    ASSESSMENT AND PLAN:     Ashley was seen today for follow up.    Diagnoses and all orders for this visit:    Type 2 diabetes mellitus with other circulatory complication, unspecified whether long term insulin use (H)  -     TN FOOT EXAM NO CHARGE    Essential hypertension  -     lisinopril (ZESTRIL) 10 MG tablet; Take 1 tablet (10 mg) by mouth daily    Age-related osteoporosis without current pathological fracture  -     DEXA HIP/PELVIS/SPINE - Future; Future    Given the pacemaker now active and recent albumin/creatinine ratio results, I will switch patient from her coreg to lisinopril for kidney benefits. Advised son to monitor home pressures over the next few weeks to assess for the need to adjust dosage.     Limited information with respects to patient's fosomax treatment. Will get a DEXA today and assess for possible changes to med regimen based on these results.     Foot exam today looks good, and no significant loss of sensation in her feet.     Discussed going to see ophthalmology for an eye exam and provided clinic options today. I don't think Ashley will need a referral, but I would be happy to place one if needed.       Nicholas Velazquez MD  [facility]  10/29/2020, 3:16 PM

## 2020-10-29 NOTE — PROGRESS NOTES
SUBJECTIVE:   Ashley is a 79 year old female here for a follow up visit for Medication Therapy Management Services. Primary care provider is Nicholas Velazquez. Was referred by her provider for   Chief Complaint   Patient presents with     Medication Therapy Management   . Ashley brought medications to the visit: no. Patient completed this visit via in person.      PLAN FROM LAST VISIT:   1. Placed Freestyle Bernice CGM sensor. Educated patient and son on proper use. -completed   2. Continue to hold glimepiride. -completed   3. Review incoming health records, specifically for osteoporosis management. -pending     ----  Ashley's MAIN CONCERN TODAY is blood sugars from Freestyle CGM.  Allergies reviewed: Yes- no changes  Pt reports using the following medical devices: Freestyle CGM  Pt reports the following barriers to care: none  Adverse reactions to medications: none  Concerns with medications: none    Medication Experience: comfortable with medications  Reports of cognitive concerns: no       DM2 - patient has continued to not take any DM medications. Denies symptoms of hyper or hypo glycemia. Reports no issues or questions regarding CGM. Son, Khang, states he continues to actively try to keep BG in goal ranges. Ashley states she is comfortable with her current diet.     119  121  155  108  184  182  150  147  131  131  135  135  111    Av, fasting 117; daytime 149  Evening 115    89% time in range, 11% above      OBJECTIVE:   Patient Care Team:  Nicholas Velazquez MD as PCP - General (Family Practice)  Marisela Campoverde RD as Diabetes Educator (Dietitian, Registered)  Current Outpatient Medications   Medication Sig Dispense Refill     alendronate (FOSAMAX) 70 MG tablet Take 70 mg by mouth every 7 days       amLODIPine (NORVASC) 10 MG tablet Take 10 mg by mouth daily       Continuous Blood Gluc  (FREESTYLE BERNICE 14 DAY READER) STEVIE Use to read blood sugars as per 's instructions. 1 each 0      "Continuous Blood Gluc Sensor (FREESTYLE BERNICE 14 DAY SENSOR) MISC Change every 14 days. 2 each 11     famotidine (PEPCID) 20 MG tablet Take 20 mg by mouth 2 times daily       lisinopril (ZESTRIL) 10 MG tablet Take 1 tablet (10 mg) by mouth daily 30 tablet 3     Multiple Vitamins-Minerals (MULTIVITAMIN ADULT PO)        simvastatin (ZOCOR) 40 MG tablet Take 40 mg by mouth At Bedtime       Patient Active Problem List   Diagnosis     Type 2 diabetes mellitus with other circulatory complication, unspecified whether long term insulin use (H)     Gait instability     Bradycardia     Cardiac pacemaker in situ     Estimated body mass index is 23.61 kg/m  as calculated from the following:    Height as of an earlier encounter on 10/29/20: 1.504 m (4' 11.21\").    Weight as of an earlier encounter on 10/29/20: 53.4 kg (117 lb 12 oz).    BP Readings from Last 3 Encounters:   10/29/20 136/82   10/07/20 (!) 148/72       ASSESSMENT:    1. Condition - DM: No drug therapy problem  BG have remained within goal range without pharmacotherapy for past 2 weeks indicating no need for medications to lower blood glucose at this time. Given history of DM, continued closer observation of blood sugars is reasonable and consideration for re-initiation of therapy in the future can be pursued. Continuing CGM to gather information about BG and maintain close watch on BG readings is reasonable.       PLAN:  Medications comments/ concerns are:   1. Continue using Freestyle Bernice CGM. -completed     Follow up: as needed for medication needs.     Options for treatment and/or follow-up care were reviewed with the patient. Ashley was engaged and actively involved in the decision making process. Ashley verbalized understanding of the options discussed and was satisfied with the final plan. Ashley was given a copy of these recommendations and an up to date medication list in an after visit summary. This report was sent to Nicholas Velazquez.     Kelly Gillespie, " PharmD  Medication Management (MTM) Pharmacist  [facility]    ---   Flowsheet completed.  # of medical conditions reviewed: 1  # of medications reviewed: 0  # of DTP identified: 0  Time spent: 20 minutes  Level of service:1

## 2020-10-29 NOTE — NURSING NOTE
"79 year old  Chief Complaint   Patient presents with     Follow Up     for blood sugar and other issues       Blood pressure (!) 145/83, pulse 83, temperature 97.4  F (36.3  C), temperature source Skin, resp. rate 16, height 1.504 m (4' 11.21\"), weight 53.4 kg (117 lb 12 oz), SpO2 100 %. Body mass index is 23.61 kg/m .  Patient Active Problem List   Diagnosis     Type 2 diabetes mellitus with other circulatory complication, unspecified whether long term insulin use (H)     Gait instability     Bradycardia     Cardiac pacemaker in situ       Wt Readings from Last 2 Encounters:   10/29/20 53.4 kg (117 lb 12 oz)     BP Readings from Last 3 Encounters:   10/29/20 (!) 145/83   10/07/20 (!) 148/72         Current Outpatient Medications   Medication     amLODIPine (NORVASC) 10 MG tablet     carvedilol (COREG) 12.5 MG tablet     Continuous Blood Gluc  (FREESTYLE ANURAG 14 DAY READER) STEVIE     Continuous Blood Gluc Sensor (FREESTYLE ANURAG 14 DAY SENSOR) MISC     famotidine (PEPCID) 20 MG tablet     simvastatin (ZOCOR) 40 MG tablet     alendronate (FOSAMAX) 70 MG tablet     Multiple Vitamins-Minerals (MULTIVITAMIN ADULT PO)     No current facility-administered medications for this visit.        Social History     Tobacco Use     Smoking status: Never Smoker     Smokeless tobacco: Never Used   Substance Use Topics     Alcohol use: Never     Frequency: Never     Binge frequency: Never     Drug use: None       Health Maintenance Due   Topic Date Due     DEXA  1940     DIABETIC FOOT EXAM  1940     ADVANCE CARE PLANNING  1940     EYE EXAM  1940     HEPATITIS C SCREENING  12/08/1959     HEPATITIS B IMMUNIZATION (1 of 3 - Risk 3-dose series) 12/08/1960     DTAP/TDAP/TD IMMUNIZATION (1 - Tdap) 12/08/1966     ZOSTER IMMUNIZATION (1 of 2) 12/08/1991     MEDICARE ANNUAL WELLNESS VISIT  12/08/2006       No results found for: PAP      October 29, 2020 3:13 PM    "

## 2020-11-03 NOTE — PROGRESS NOTES
"  Reason for Visit:  Today I have seen Ashley Lynn for pacemaker follow-up  Consult: No    HPI : Status / Symptoms / Concerns     79-year-old female with cardiac pacemaker and type 2 diabetes.  She recently moved from [place] to [place] to live with her son.  Pacemaker implant for bradycardia with heart rates in her 20s and syncopal events.  This has all resolved after the pacemaker implant.    Chest Pain:   No  Shortness of Breath:  No  Ankle Swelling:  Trivial  Muscle Aches:  No  Palpitations:   No    Lightheadedness:  No  Fainting:   Resolved  Medication Issues:  Recent change in medications  Recent Test:  No    Past Medical History:   Diagnosis Date     Cardiac pacemaker in situ 10/7/2020     Type 2 diabetes mellitus with other circulatory complication, unspecified whether long term insulin use (H) 10/7/2020      Past Surgical History:   Procedure Laterality Date     IMPLANT PACEMAKER          Other Systems:  Resp - / GI - /MS - /Hayley - /Psy - /Derm - /Hem - / - /Lymph - /ENT -/ Endo +  No other pertinent concern in systems review.     Social History: reports that she has never smoked. She has never used smokeless tobacco. She reports that she does not drink alcohol. Retired lives with her son    Family History: History of hypertension    Medications:  Current Outpatient Medications   Medication     alendronate (FOSAMAX) 70 MG tablet     amLODIPine (NORVASC) 10 MG tablet     Continuous Blood Gluc  (FREESTYLE ANURAG 14 DAY READER) STEVIE     Continuous Blood Gluc Sensor (FREESTYLE ANURAG 14 DAY SENSOR) MISC     famotidine (PEPCID) 20 MG tablet     lisinopril (ZESTRIL) 10 MG tablet     Multiple Vitamins-Minerals (MULTIVITAMIN ADULT PO)     simvastatin (ZOCOR) 40 MG tablet     No current facility-administered medications for this visit.         Exam:  /75 (BP Location: Right arm, Patient Position: Sitting, Cuff Size: Adult Regular)   Pulse 86   Ht 1.499 m (4' 11\")   Wt 53.1 kg (117 lb)   SpO2 " 100%   BMI 23.63 kg/m     Body mass index is 23.63 kg/m .   General:  Alert, oriented, no acute distress  Eyes:  External exam normal, Conjunctivae noninjected and nonicteric.  Mouth:  Moist mucous membranes, restored teeth  Ears:  Hearing grossly intact  Neck:  No thyromegaly. Carotid upstroke normal, no carotid bruit, no JVD  Lungs:  Clear to auscultation bilaterally. No wheezes, crackles, rales or rhonchi,      no accessory muscle use   Heart:  Regular, normal S1 and S2, no obvious murmurs, no rubs or gallops  Abdomen: Soft, non-tender  Extremities: No peripheral edema, age appropriate skin without stasis  Pulses: Pedal 2+ bilaterally  Hayley/Psy: Non-focal, normal mood, normal affect        Vital Trend:  Wt Readings from Last 3 Encounters:   10/29/20 53.4 kg (117 lb 12 oz)     BP Readings from Last 3 Encounters:   10/29/20 136/82   10/07/20 (!) 148/72     Pulse Readings from Last 3 Encounters:   10/29/20 80   10/07/20 80        Data:     ECG 2020:  A and V paced  Today: A sensed V paced with a HI interval of about 300ms    Echocardiogram 2020:       Left Heart Catheterization 2020:        Lab Review:  Lab Results   Component Value Date    CR 1.1 10/07/2020    .0 10/07/2020    HDL 53.0 10/07/2020    POTASSIUM 4.3 10/07/2020    .0 10/07/2020         Assessment:     Ashley Lynn is a 79 year old female with recent pacemaker implantation in [place] where she was initially evaluated for chest pain and syncope.  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  An invasive work-up for coronary disease was negative.  Her symptoms have resolved.    Blood pressure control could be optimized.  Given her conduction system disease there is not much reason to give carvedilol which we discontinued today.  If she should have sinus tachycardia that this is not a reason to start a beta-blocker. At the same time I increase the lisinopril dose from 10 mg to 20 mg.  If her blood  pressure continues to be on average above 130 mmHg I would recommend 10 mg of Norvasc in addition.  Today she will have her pacemaker evaluated.      Plan:     1. HTN       - Increase lisinopril to 20 mg, STOP carvedilol  2.  Conduction system disease      - Dual-chamber pacemaker    Contingency Plan: Amlodipine 10 mg  Follow-up: With EP in 6 months to see if pacemaker settings can be optimized    This note was software transcribed.

## 2020-11-04 ENCOUNTER — ANCILLARY PROCEDURE (OUTPATIENT)
Dept: BONE DENSITY | Facility: CLINIC | Age: 80
End: 2020-11-04
Attending: FAMILY MEDICINE
Payer: MEDICARE

## 2020-11-04 ENCOUNTER — ANCILLARY PROCEDURE (OUTPATIENT)
Dept: CARDIOLOGY | Facility: CLINIC | Age: 80
End: 2020-11-04
Attending: INTERNAL MEDICINE
Payer: MEDICARE

## 2020-11-04 ENCOUNTER — PRE VISIT (OUTPATIENT)
Dept: CARDIOLOGY | Facility: CLINIC | Age: 80
End: 2020-11-04

## 2020-11-04 VITALS
WEIGHT: 117 LBS | OXYGEN SATURATION: 100 % | BODY MASS INDEX: 23.59 KG/M2 | HEIGHT: 59 IN | DIASTOLIC BLOOD PRESSURE: 75 MMHG | HEART RATE: 86 BPM | SYSTOLIC BLOOD PRESSURE: 116 MMHG

## 2020-11-04 DIAGNOSIS — I10 ESSENTIAL HYPERTENSION: ICD-10-CM

## 2020-11-04 DIAGNOSIS — R00.1 BRADYCARDIA: Primary | ICD-10-CM

## 2020-11-04 DIAGNOSIS — R00.1 BRADYCARDIA: ICD-10-CM

## 2020-11-04 DIAGNOSIS — M81.0 AGE-RELATED OSTEOPOROSIS WITHOUT CURRENT PATHOLOGICAL FRACTURE: ICD-10-CM

## 2020-11-04 PROCEDURE — 93005 ELECTROCARDIOGRAM TRACING: CPT

## 2020-11-04 PROCEDURE — 99204 OFFICE O/P NEW MOD 45 MIN: CPT | Mod: 25 | Performed by: INTERNAL MEDICINE

## 2020-11-04 PROCEDURE — G0463 HOSPITAL OUTPT CLINIC VISIT: HCPCS | Mod: 25

## 2020-11-04 PROCEDURE — 77080 DXA BONE DENSITY AXIAL: CPT | Performed by: INTERNAL MEDICINE

## 2020-11-04 PROCEDURE — 93280 PM DEVICE PROGR EVAL DUAL: CPT | Performed by: INTERNAL MEDICINE

## 2020-11-04 RX ORDER — CARVEDILOL 12.5 MG/1
12.5 TABLET ORAL 2 TIMES DAILY WITH MEALS
COMMUNITY
End: 2020-11-04

## 2020-11-04 RX ORDER — GLIMEPIRIDE 2 MG/1
2 TABLET ORAL
COMMUNITY
End: 2021-03-16

## 2020-11-04 RX ORDER — LISINOPRIL 20 MG/1
20 TABLET ORAL DAILY
Qty: 90 TABLET | Refills: 11 | Status: SHIPPED | OUTPATIENT
Start: 2020-11-04 | End: 2021-03-04

## 2020-11-04 ASSESSMENT — MIFFLIN-ST. JEOR: SCORE: 916.34

## 2020-11-04 ASSESSMENT — PAIN SCALES - GENERAL: PAINLEVEL: NO PAIN (0)

## 2020-11-04 NOTE — LETTER
11/4/2020      RE: Ashley Lynn  [address]  [facility] [address]       Dear Colleague,    Thank you for the opportunity to participate in the care of your patient, Ashley Lynn, at the [facility] [place] at [facility]. Please see a copy of my visit note below.      Reason for Visit:  Today I have seen Ashley Lynn for pacemaker follow-up  Consult: No    HPI : Status / Symptoms / Concerns     79-year-old female with cardiac pacemaker and type 2 diabetes.  She recently moved from [place] to [place] to live with her son.  Pacemaker implant for bradycardia with heart rates in her 20s and syncopal events.  This has all resolved after the pacemaker implant.    Chest Pain:   No  Shortness of Breath:  No  Ankle Swelling:  Trivial  Muscle Aches:  No  Palpitations:   No    Lightheadedness:  No  Fainting:   Resolved  Medication Issues:  Recent change in medications  Recent Test:  No    Past Medical History:   Diagnosis Date     Cardiac pacemaker in situ 10/7/2020     Type 2 diabetes mellitus with other circulatory complication, unspecified whether long term insulin use (H) 10/7/2020      Past Surgical History:   Procedure Laterality Date     IMPLANT PACEMAKER          Other Systems:  Resp - / GI - /MS - /Hayley - /Psy - /Derm - /Hem - / - /Lymph - /ENT -/ Endo +  No other pertinent concern in systems review.     Social History: reports that she has never smoked. She has never used smokeless tobacco. She reports that she does not drink alcohol. Retired lives with her son    Family History: History of hypertension    Medications:  Current Outpatient Medications   Medication     alendronate (FOSAMAX) 70 MG tablet     amLODIPine (NORVASC) 10 MG tablet     Continuous Blood Gluc  (FREESTYLE ANURAG 14 DAY READER) STEVIE     Continuous Blood Gluc Sensor (FREESTYLE ANURAG 14 DAY SENSOR) MISC     famotidine (PEPCID) 20 MG tablet     lisinopril (ZESTRIL) 10 MG  "tablet     Multiple Vitamins-Minerals (MULTIVITAMIN ADULT PO)     simvastatin (ZOCOR) 40 MG tablet     No current facility-administered medications for this visit.         Exam:  /75 (BP Location: Right arm, Patient Position: Sitting, Cuff Size: Adult Regular)   Pulse 86   Ht 1.499 m (4' 11\")   Wt 53.1 kg (117 lb)   SpO2 100%   BMI 23.63 kg/m     Body mass index is 23.63 kg/m .   General:  Alert, oriented, no acute distress  Eyes:  External exam normal, Conjunctivae noninjected and nonicteric.  Mouth:  Moist mucous membranes, restored teeth  Ears:  Hearing grossly intact  Neck:  No thyromegaly. Carotid upstroke normal, no carotid bruit, no JVD  Lungs:  Clear to auscultation bilaterally. No wheezes, crackles, rales or rhonchi,      no accessory muscle use   Heart:  Regular, normal S1 and S2, no obvious murmurs, no rubs or gallops  Abdomen: Soft, non-tender  Extremities: No peripheral edema, age appropriate skin without stasis  Pulses: Pedal 2+ bilaterally  Hayley/Psy: Non-focal, normal mood, normal affect        Vital Trend:  Wt Readings from Last 3 Encounters:   10/29/20 53.4 kg (117 lb 12 oz)     BP Readings from Last 3 Encounters:   10/29/20 136/82   10/07/20 (!) 148/72     Pulse Readings from Last 3 Encounters:   10/29/20 80   10/07/20 80        Data:     ECG 2020:  A and V paced  Today: A sensed V paced with a NJ interval of about 300ms    Echocardiogram 2020:       Left Heart Catheterization 2020:        Lab Review:  Lab Results   Component Value Date    CR 1.1 10/07/2020    .0 10/07/2020    HDL 53.0 10/07/2020    POTASSIUM 4.3 10/07/2020    .0 10/07/2020         Assessment:     Ashley Lynn is a 79 year old female with recent pacemaker implantation in [place] where she was initially evaluated for chest pain and syncope.  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  An invasive work-up for coronary disease was negative.  Her symptoms have " resolved.    Blood pressure control could be optimized.  Given her conduction system disease there is not much reason to give carvedilol which we discontinued today.  If she should have sinus tachycardia that this is not a reason to start a beta-blocker. At the same time I increase the lisinopril dose from 10 mg to 20 mg.  If her blood pressure continues to be on average above 130 mmHg I would recommend 10 mg of Norvasc in addition.  Today she will have her pacemaker evaluated.      Plan:     1. HTN       - Increase lisinopril to 20 mg, STOP carvedilol  2.  Conduction system disease      - Dual-chamber pacemaker    Contingency Plan: Amlodipine 10 mg  Follow-up: With EP in 6 months to see if pacemaker settings can be optimized    This note was software transcribed.       Please do not hesitate to contact me if you have any questions/concerns.     Sincerely,     Daniel العراقي MD

## 2020-11-04 NOTE — NURSING NOTE
Chief Complaint   Patient presents with     Follow Up      new - establish/transfer care from [place], [place]        Vitals were taken and medications were reconciled.     Bianka Key  7:07 AM

## 2020-11-05 LAB
MDC_IDC_LEAD_IMPLANT_DT: NORMAL
MDC_IDC_LEAD_IMPLANT_DT: NORMAL
MDC_IDC_LEAD_LOCATION: NORMAL
MDC_IDC_LEAD_LOCATION: NORMAL
MDC_IDC_LEAD_LOCATION_DETAIL_1: NORMAL
MDC_IDC_LEAD_LOCATION_DETAIL_1: NORMAL
MDC_IDC_LEAD_MFG: NORMAL
MDC_IDC_LEAD_MFG: NORMAL
MDC_IDC_LEAD_MODEL: NORMAL
MDC_IDC_LEAD_MODEL: NORMAL
MDC_IDC_LEAD_POLARITY_TYPE: NORMAL
MDC_IDC_LEAD_POLARITY_TYPE: NORMAL
MDC_IDC_LEAD_SERIAL: NORMAL
MDC_IDC_LEAD_SERIAL: NORMAL
MDC_IDC_LEAD_SPECIAL_FUNCTION: NORMAL
MDC_IDC_LEAD_SPECIAL_FUNCTION: NORMAL
MDC_IDC_MSMT_BATTERY_DTM: NORMAL
MDC_IDC_MSMT_BATTERY_REMAINING_LONGEVITY: 132 MO
MDC_IDC_MSMT_BATTERY_RRT_TRIGGER: 2.62
MDC_IDC_MSMT_BATTERY_STATUS: NORMAL
MDC_IDC_MSMT_BATTERY_VOLTAGE: 3.17 V
MDC_IDC_MSMT_LEADCHNL_RA_IMPEDANCE_VALUE: 437 OHM
MDC_IDC_MSMT_LEADCHNL_RA_IMPEDANCE_VALUE: 551 OHM
MDC_IDC_MSMT_LEADCHNL_RA_PACING_THRESHOLD_AMPLITUDE: 0.75 V
MDC_IDC_MSMT_LEADCHNL_RA_PACING_THRESHOLD_PULSEWIDTH: 0.4 MS
MDC_IDC_MSMT_LEADCHNL_RA_SENSING_INTR_AMPL: 3.25 MV
MDC_IDC_MSMT_LEADCHNL_RA_SENSING_INTR_AMPL: 3.5 MV
MDC_IDC_MSMT_LEADCHNL_RV_IMPEDANCE_VALUE: 380 OHM
MDC_IDC_MSMT_LEADCHNL_RV_IMPEDANCE_VALUE: 475 OHM
MDC_IDC_MSMT_LEADCHNL_RV_PACING_THRESHOLD_AMPLITUDE: 0.75 V
MDC_IDC_MSMT_LEADCHNL_RV_PACING_THRESHOLD_PULSEWIDTH: 0.4 MS
MDC_IDC_PG_IMPLANT_DTM: NORMAL
MDC_IDC_PG_MFG: NORMAL
MDC_IDC_PG_MODEL: NORMAL
MDC_IDC_PG_SERIAL: NORMAL
MDC_IDC_PG_TYPE: NORMAL
MDC_IDC_SESS_CLINIC_NAME: NORMAL
MDC_IDC_SESS_DTM: NORMAL
MDC_IDC_SESS_TYPE: NORMAL
MDC_IDC_SET_BRADY_AT_MODE_SWITCH_RATE: 171 {BEATS}/MIN
MDC_IDC_SET_BRADY_HYSTRATE: NORMAL
MDC_IDC_SET_BRADY_LOWRATE: 60 {BEATS}/MIN
MDC_IDC_SET_BRADY_MAX_SENSOR_RATE: 120 {BEATS}/MIN
MDC_IDC_SET_BRADY_MAX_TRACKING_RATE: 120 {BEATS}/MIN
MDC_IDC_SET_BRADY_MODE: NORMAL
MDC_IDC_SET_BRADY_PAV_DELAY_LOW: 220 MS
MDC_IDC_SET_BRADY_SAV_DELAY_LOW: 200 MS
MDC_IDC_SET_LEADCHNL_RA_PACING_AMPLITUDE: 3.5 V
MDC_IDC_SET_LEADCHNL_RA_PACING_ANODE_ELECTRODE_1: NORMAL
MDC_IDC_SET_LEADCHNL_RA_PACING_ANODE_LOCATION_1: NORMAL
MDC_IDC_SET_LEADCHNL_RA_PACING_CAPTURE_MODE: NORMAL
MDC_IDC_SET_LEADCHNL_RA_PACING_CATHODE_ELECTRODE_1: NORMAL
MDC_IDC_SET_LEADCHNL_RA_PACING_CATHODE_LOCATION_1: NORMAL
MDC_IDC_SET_LEADCHNL_RA_PACING_POLARITY: NORMAL
MDC_IDC_SET_LEADCHNL_RA_PACING_PULSEWIDTH: 0.4 MS
MDC_IDC_SET_LEADCHNL_RA_SENSING_ANODE_ELECTRODE_1: NORMAL
MDC_IDC_SET_LEADCHNL_RA_SENSING_ANODE_LOCATION_1: NORMAL
MDC_IDC_SET_LEADCHNL_RA_SENSING_CATHODE_ELECTRODE_1: NORMAL
MDC_IDC_SET_LEADCHNL_RA_SENSING_CATHODE_LOCATION_1: NORMAL
MDC_IDC_SET_LEADCHNL_RA_SENSING_POLARITY: NORMAL
MDC_IDC_SET_LEADCHNL_RA_SENSING_SENSITIVITY: 0.3 MV
MDC_IDC_SET_LEADCHNL_RV_PACING_AMPLITUDE: 3.5 V
MDC_IDC_SET_LEADCHNL_RV_PACING_ANODE_ELECTRODE_1: NORMAL
MDC_IDC_SET_LEADCHNL_RV_PACING_ANODE_LOCATION_1: NORMAL
MDC_IDC_SET_LEADCHNL_RV_PACING_CAPTURE_MODE: NORMAL
MDC_IDC_SET_LEADCHNL_RV_PACING_CATHODE_ELECTRODE_1: NORMAL
MDC_IDC_SET_LEADCHNL_RV_PACING_CATHODE_LOCATION_1: NORMAL
MDC_IDC_SET_LEADCHNL_RV_PACING_POLARITY: NORMAL
MDC_IDC_SET_LEADCHNL_RV_PACING_PULSEWIDTH: 0.4 MS
MDC_IDC_SET_LEADCHNL_RV_SENSING_ANODE_ELECTRODE_1: NORMAL
MDC_IDC_SET_LEADCHNL_RV_SENSING_ANODE_LOCATION_1: NORMAL
MDC_IDC_SET_LEADCHNL_RV_SENSING_CATHODE_ELECTRODE_1: NORMAL
MDC_IDC_SET_LEADCHNL_RV_SENSING_CATHODE_LOCATION_1: NORMAL
MDC_IDC_SET_LEADCHNL_RV_SENSING_POLARITY: NORMAL
MDC_IDC_SET_LEADCHNL_RV_SENSING_SENSITIVITY: 0.9 MV
MDC_IDC_SET_ZONE_DETECTION_INTERVAL: 350 MS
MDC_IDC_SET_ZONE_DETECTION_INTERVAL: 400 MS
MDC_IDC_SET_ZONE_TYPE: NORMAL
MDC_IDC_STAT_AT_BURDEN_PERCENT: 0 %
MDC_IDC_STAT_AT_DTM_END: NORMAL
MDC_IDC_STAT_AT_DTM_START: NORMAL
MDC_IDC_STAT_BRADY_AP_VP_PERCENT: 0.85 %
MDC_IDC_STAT_BRADY_AP_VS_PERCENT: 0 %
MDC_IDC_STAT_BRADY_AS_VP_PERCENT: 99.15 %
MDC_IDC_STAT_BRADY_AS_VS_PERCENT: 0.01 %
MDC_IDC_STAT_BRADY_DTM_END: NORMAL
MDC_IDC_STAT_BRADY_DTM_START: NORMAL
MDC_IDC_STAT_BRADY_RA_PERCENT_PACED: 0.84 %
MDC_IDC_STAT_BRADY_RV_PERCENT_PACED: 99.99 %
MDC_IDC_STAT_EPISODE_RECENT_COUNT: 0
MDC_IDC_STAT_EPISODE_RECENT_COUNT_DTM_END: NORMAL
MDC_IDC_STAT_EPISODE_RECENT_COUNT_DTM_START: NORMAL
MDC_IDC_STAT_EPISODE_TOTAL_COUNT: 0
MDC_IDC_STAT_EPISODE_TOTAL_COUNT_DTM_END: NORMAL
MDC_IDC_STAT_EPISODE_TOTAL_COUNT_DTM_START: NORMAL
MDC_IDC_STAT_EPISODE_TYPE: NORMAL

## 2020-11-06 LAB — INTERPRETATION ECG - MUSE: NORMAL

## 2020-12-02 ENCOUNTER — MYC MEDICAL ADVICE (OUTPATIENT)
Dept: FAMILY MEDICINE | Facility: CLINIC | Age: 80
End: 2020-12-02

## 2020-12-02 DIAGNOSIS — I10 ESSENTIAL HYPERTENSION: ICD-10-CM

## 2020-12-02 DIAGNOSIS — E11.59 TYPE 2 DIABETES MELLITUS WITH OTHER CIRCULATORY COMPLICATION, UNSPECIFIED WHETHER LONG TERM INSULIN USE (H): Primary | ICD-10-CM

## 2020-12-02 RX ORDER — AMLODIPINE BESYLATE 10 MG/1
10 TABLET ORAL DAILY
Qty: 90 TABLET | Refills: 3 | Status: SHIPPED | OUTPATIENT
Start: 2020-12-02 | End: 2021-03-04

## 2020-12-02 RX ORDER — SIMVASTATIN 40 MG
40 TABLET ORAL AT BEDTIME
Qty: 90 TABLET | Refills: 3 | Status: SHIPPED | OUTPATIENT
Start: 2020-12-02 | End: 2021-03-04

## 2021-01-04 ENCOUNTER — HEALTH MAINTENANCE LETTER (OUTPATIENT)
Age: 81
End: 2021-01-04

## 2021-01-15 ENCOUNTER — HEALTH MAINTENANCE LETTER (OUTPATIENT)
Age: 81
End: 2021-01-15

## 2021-02-01 ENCOUNTER — IMMUNIZATION (OUTPATIENT)
Dept: PEDIATRICS | Facility: CLINIC | Age: 81
End: 2021-02-01
Payer: MEDICARE

## 2021-02-01 PROCEDURE — 0001A PR COVID VAC PFIZER DIL RECON 30 MCG/0.3 ML IM: CPT

## 2021-02-01 PROCEDURE — 91300 PR COVID VAC PFIZER DIL RECON 30 MCG/0.3 ML IM: CPT

## 2021-02-03 ENCOUNTER — ANCILLARY PROCEDURE (OUTPATIENT)
Dept: CARDIOLOGY | Facility: CLINIC | Age: 81
End: 2021-02-03
Attending: INTERNAL MEDICINE
Payer: MEDICARE

## 2021-02-03 PROCEDURE — 93296 REM INTERROG EVL PM/IDS: CPT

## 2021-02-03 PROCEDURE — 93294 REM INTERROG EVL PM/LDLS PM: CPT | Performed by: INTERNAL MEDICINE

## 2021-02-22 ENCOUNTER — IMMUNIZATION (OUTPATIENT)
Dept: PEDIATRICS | Facility: CLINIC | Age: 81
End: 2021-02-22
Attending: INTERNAL MEDICINE
Payer: MEDICARE

## 2021-02-22 PROCEDURE — 0002A PR COVID VAC PFIZER DIL RECON 30 MCG/0.3 ML IM: CPT

## 2021-02-22 PROCEDURE — 91300 PR COVID VAC PFIZER DIL RECON 30 MCG/0.3 ML IM: CPT

## 2021-03-03 ENCOUNTER — TELEPHONE (OUTPATIENT)
Dept: FAMILY MEDICINE | Facility: CLINIC | Age: 81
End: 2021-03-03

## 2021-03-03 DIAGNOSIS — E11.59 TYPE 2 DIABETES MELLITUS WITH OTHER CIRCULATORY COMPLICATION, UNSPECIFIED WHETHER LONG TERM INSULIN USE (H): ICD-10-CM

## 2021-03-03 DIAGNOSIS — I10 ESSENTIAL HYPERTENSION: ICD-10-CM

## 2021-03-03 NOTE — TELEPHONE ENCOUNTER
Who is calling? Khang (Son)  Medication name: All meds  Is this a refill request? No  Have they contacted the pharmacy?  Yes  Pharmacy: [facility]  Question/Concern: Son calling because he would like [facility] prescriptions [facility] to [facility]. He contacted [facility] already to try and get them transferred and they told him that he needed to contact us to send new ones.  Would patient like a call back? No if needed    Roro

## 2021-03-04 RX ORDER — AMLODIPINE BESYLATE 10 MG/1
10 TABLET ORAL DAILY
Qty: 90 TABLET | Refills: 2 | Status: SHIPPED | OUTPATIENT
Start: 2021-03-04 | End: 2022-01-27

## 2021-03-04 RX ORDER — FLASH GLUCOSE SENSOR
KIT MISCELLANEOUS
Qty: 2 EACH | Refills: 6 | Status: SHIPPED | OUTPATIENT
Start: 2021-03-04 | End: 2021-03-16

## 2021-03-04 RX ORDER — LISINOPRIL 20 MG/1
20 TABLET ORAL DAILY
Qty: 90 TABLET | Refills: 2 | Status: SHIPPED | OUTPATIENT
Start: 2021-03-04 | End: 2022-01-27

## 2021-03-04 RX ORDER — SIMVASTATIN 40 MG
40 TABLET ORAL AT BEDTIME
Qty: 90 TABLET | Refills: 3 | Status: SHIPPED | OUTPATIENT
Start: 2021-03-04 | End: 2022-01-27

## 2021-03-04 NOTE — TELEPHONE ENCOUNTER
Changed to [facility] pharmacy, sent remaining refills to new pharmacy.     Maryuri Melton RN  03/04/21  9:58 AM

## 2021-03-09 ENCOUNTER — TELEPHONE (OUTPATIENT)
Dept: FAMILY MEDICINE | Facility: CLINIC | Age: 81
End: 2021-03-09

## 2021-03-09 DIAGNOSIS — E11.59 TYPE 2 DIABETES MELLITUS WITH OTHER CIRCULATORY COMPLICATION, UNSPECIFIED WHETHER LONG TERM INSULIN USE (H): ICD-10-CM

## 2021-03-09 NOTE — TELEPHONE ENCOUNTER
Prior Authorization Retail Medication Request    Medication/Dose: Free-style rufus sensor 14 day kit  ICD code (if different than what is on RX):  same  Previously Tried and Failed:    Rationale:      Insurance Name:  same  Insurance ID:  same      Pharmacy Information (if different than what is on RX)  [facility]  Phone:  [phone]  Bekah Yoon RN  [facility]

## 2021-03-10 NOTE — TELEPHONE ENCOUNTER
PA Initiation    Medication: Continuous Blood Gluc Sensor (FREESTYLE ANURAG 14 DAY SENSOR) [facility]   Insurance Company: [employer] Part D - Phone [phone] Fax [phone]  Pharmacy Filling the Rx: [facility] [address] [place] [address]  Filling Pharmacy Phone: [phone]  Filling Pharmacy Fax: [phone]  Start Date: 3/10/2021

## 2021-03-11 NOTE — TELEPHONE ENCOUNTER
PRIOR AUTHORIZATION DENIED    Medication: Continuous Blood Gluc Sensor (FREESTYLE ANURAG 14 DAY SENSOR) [facility]DEN[facility]    Denial Date: 3/10/2021    Denial Rational: Not covered under Medicare Part D, possible coverage under Part B.  Per pharmacy getting a rejection through Part B as well.  PA dept doesn't handle Part B requests.

## 2021-03-16 DIAGNOSIS — E11.59 TYPE 2 DIABETES MELLITUS WITH OTHER CIRCULATORY COMPLICATION, UNSPECIFIED WHETHER LONG TERM INSULIN USE (H): ICD-10-CM

## 2021-03-16 RX ORDER — FLASH GLUCOSE SENSOR
KIT MISCELLANEOUS
Qty: 2 EACH | Refills: 6 | Status: SHIPPED | OUTPATIENT
Start: 2021-03-16 | End: 2021-03-25

## 2021-03-16 NOTE — TELEPHONE ENCOUNTER
Called patient's son, Khang, to discuss Freestyle Bernice use. States he has been trying to go through multiple pharmacies to get refill of the sensors. Keeps getting denied. Not sure what to do next.     Confirmed that patient is NOT currently taking any blood glucose lowering medications. Medication list was updated to reflect this. Focusing on diet control and using Freestyle as a means for ongoing monitoring.     Sent prescription for Freestyle Bernice to [employer] to try and determine coverage. May need to navigate coverage given lack of use of diabetes medications currently. Ongoing assessment of need for Freestyle Bernice will be beneficial.     Khang agreed to follow up with any further questions or barriers or if they do not hear from [facility] this week.     Kelly Gillespie, PharmD, BCACP  Medication Management (MTM) Pharmacist  [facility]

## 2021-03-31 ENCOUNTER — TELEPHONE (OUTPATIENT)
Dept: FAMILY MEDICINE | Facility: CLINIC | Age: 81
End: 2021-03-31

## 2021-03-31 NOTE — Clinical Note
"Hi Jake,     Please update this letter as you feel necessary and add your signature. When done, click \"emre, jake as sent and accept\" and leave at  with cover sheet to fax to [x+1] [facility].     Thanks!  Kelly"

## 2021-03-31 NOTE — TELEPHONE ENCOUNTER
[facility] called and stated they sent over a form for a rufus stye machine and needs provider to send over an RX. Rep stated he would resend as I did not see a form in.

## 2021-03-31 NOTE — LETTER
April 6, 2021    RE: Ashley Lynn  [address] [place] [address]      This letter verifies that patient has previously received a Freestyle Bernice CGM 14 day reader device and is able to continue to use this device with appropriate Freestyle Bernice sensors.

## 2021-04-02 NOTE — TELEPHONE ENCOUNTER
Who is calling? [facility]  Medication name: Continuous Blood Gluc  (FREESTYLE ANURAG 14 DAY READER) STEVIE  Is this a refill request? Yes  Have they contacted the pharmacy?  Yes  Pharmacy:  [facility] - [address]  [address] [place] [address]. [address] [place] [id number]  Phone: [phone] Fax: [phone]  Question/Concern: [facility] requesting above refill to be sent in as soon as possible. Per Mary, they have already received the Continuous Blood Gluc Sensor (FREESTYLE ANURAG 14 DAY SENSOR) MISC  Would patient like a call back? Yes, call [facility] if necessary.

## 2021-04-06 NOTE — TELEPHONE ENCOUNTER
"Called [facility] to clarify further information needed for patient's Freestyle Bernice sensors. Based on information sent, two different \"certificate of medical necessity forms\" were sent stating different guidelines for supporting information needed. Spoke with representative at [facility] who stated that supplies will be covered through patient's Medicare insurance based on patient reporting that they are injecting insulin 1x per day. For safety reasons, [facility] needs documentation that patient already received a reader device in order to appropriately supply sensors. Letter from PCP stating the patient has a reader is sufficient. Will forward to Nicholas Velazquez to send letter.     Kelly Gillespie, PharmD, BCACP  Medication Management (MTM) Pharmacist  [facility]    "

## 2021-05-01 ENCOUNTER — HEALTH MAINTENANCE LETTER (OUTPATIENT)
Age: 81
End: 2021-05-01

## 2021-05-12 ENCOUNTER — ANCILLARY PROCEDURE (OUTPATIENT)
Dept: CARDIOLOGY | Facility: CLINIC | Age: 81
End: 2021-05-12
Attending: INTERNAL MEDICINE
Payer: MEDICARE

## 2021-05-12 DIAGNOSIS — R00.1 BRADYCARDIA: ICD-10-CM

## 2021-05-12 PROCEDURE — 93296 REM INTERROG EVL PM/IDS: CPT

## 2021-05-12 PROCEDURE — 93294 REM INTERROG EVL PM/LDLS PM: CPT | Performed by: INTERNAL MEDICINE

## 2021-05-13 LAB
MDC_IDC_EPISODE_DTM: NORMAL
MDC_IDC_EPISODE_DURATION: 112 S
MDC_IDC_EPISODE_DURATION: 116 S
MDC_IDC_EPISODE_DURATION: 14 S
MDC_IDC_EPISODE_DURATION: 14 S
MDC_IDC_EPISODE_DURATION: 16 S
MDC_IDC_EPISODE_DURATION: 172 S
MDC_IDC_EPISODE_DURATION: 18 S
MDC_IDC_EPISODE_DURATION: 181 S
MDC_IDC_EPISODE_DURATION: 185 S
MDC_IDC_EPISODE_DURATION: 186 S
MDC_IDC_EPISODE_DURATION: 187 S
MDC_IDC_EPISODE_DURATION: 20 S
MDC_IDC_EPISODE_DURATION: 20 S
MDC_IDC_EPISODE_DURATION: 22 S
MDC_IDC_EPISODE_DURATION: 3 S
MDC_IDC_EPISODE_DURATION: 32 S
MDC_IDC_EPISODE_DURATION: 43 S
MDC_IDC_EPISODE_DURATION: 48 S
MDC_IDC_EPISODE_DURATION: 5 S
MDC_IDC_EPISODE_DURATION: 5 S
MDC_IDC_EPISODE_DURATION: 57 S
MDC_IDC_EPISODE_DURATION: 7 S
MDC_IDC_EPISODE_DURATION: 73 S
MDC_IDC_EPISODE_DURATION: 73 S
MDC_IDC_EPISODE_DURATION: 80 S
MDC_IDC_EPISODE_DURATION: 98 S
MDC_IDC_EPISODE_ID: 192
MDC_IDC_EPISODE_ID: 193
MDC_IDC_EPISODE_ID: 194
MDC_IDC_EPISODE_ID: 195
MDC_IDC_EPISODE_ID: 196
MDC_IDC_EPISODE_ID: 197
MDC_IDC_EPISODE_ID: 198
MDC_IDC_EPISODE_ID: 199
MDC_IDC_EPISODE_ID: 200
MDC_IDC_EPISODE_ID: 201
MDC_IDC_EPISODE_ID: 202
MDC_IDC_EPISODE_ID: 203
MDC_IDC_EPISODE_ID: 204
MDC_IDC_EPISODE_ID: 205
MDC_IDC_EPISODE_ID: 206
MDC_IDC_EPISODE_ID: 207
MDC_IDC_EPISODE_ID: 208
MDC_IDC_EPISODE_ID: 209
MDC_IDC_EPISODE_ID: 210
MDC_IDC_EPISODE_ID: 211
MDC_IDC_EPISODE_ID: 212
MDC_IDC_EPISODE_ID: 213
MDC_IDC_EPISODE_ID: 214
MDC_IDC_EPISODE_ID: 215
MDC_IDC_EPISODE_ID: 216
MDC_IDC_EPISODE_ID: 217
MDC_IDC_EPISODE_ID: 218
MDC_IDC_EPISODE_ID: 219
MDC_IDC_EPISODE_TYPE: NORMAL
MDC_IDC_LEAD_IMPLANT_DT: NORMAL
MDC_IDC_LEAD_IMPLANT_DT: NORMAL
MDC_IDC_LEAD_LOCATION: NORMAL
MDC_IDC_LEAD_LOCATION: NORMAL
MDC_IDC_LEAD_LOCATION_DETAIL_1: NORMAL
MDC_IDC_LEAD_LOCATION_DETAIL_1: NORMAL
MDC_IDC_LEAD_MFG: NORMAL
MDC_IDC_LEAD_MFG: NORMAL
MDC_IDC_LEAD_MODEL: NORMAL
MDC_IDC_LEAD_MODEL: NORMAL
MDC_IDC_LEAD_POLARITY_TYPE: NORMAL
MDC_IDC_LEAD_POLARITY_TYPE: NORMAL
MDC_IDC_LEAD_SERIAL: NORMAL
MDC_IDC_LEAD_SERIAL: NORMAL
MDC_IDC_LEAD_SPECIAL_FUNCTION: NORMAL
MDC_IDC_LEAD_SPECIAL_FUNCTION: NORMAL
MDC_IDC_MSMT_BATTERY_DTM: NORMAL
MDC_IDC_MSMT_BATTERY_REMAINING_LONGEVITY: 157 MO
MDC_IDC_MSMT_BATTERY_RRT_TRIGGER: 2.62
MDC_IDC_MSMT_BATTERY_STATUS: NORMAL
MDC_IDC_MSMT_BATTERY_VOLTAGE: 3.11 V
MDC_IDC_MSMT_LEADCHNL_RA_IMPEDANCE_VALUE: 475 OHM
MDC_IDC_MSMT_LEADCHNL_RA_IMPEDANCE_VALUE: 665 OHM
MDC_IDC_MSMT_LEADCHNL_RA_PACING_THRESHOLD_AMPLITUDE: 0.75 V
MDC_IDC_MSMT_LEADCHNL_RA_PACING_THRESHOLD_PULSEWIDTH: 0.4 MS
MDC_IDC_MSMT_LEADCHNL_RA_SENSING_INTR_AMPL: 4 MV
MDC_IDC_MSMT_LEADCHNL_RA_SENSING_INTR_AMPL: 4 MV
MDC_IDC_MSMT_LEADCHNL_RV_IMPEDANCE_VALUE: 342 OHM
MDC_IDC_MSMT_LEADCHNL_RV_IMPEDANCE_VALUE: 456 OHM
MDC_IDC_MSMT_LEADCHNL_RV_PACING_THRESHOLD_AMPLITUDE: 0.75 V
MDC_IDC_MSMT_LEADCHNL_RV_PACING_THRESHOLD_PULSEWIDTH: 0.4 MS
MDC_IDC_MSMT_LEADCHNL_RV_SENSING_INTR_AMPL: 2.38 MV
MDC_IDC_MSMT_LEADCHNL_RV_SENSING_INTR_AMPL: 2.38 MV
MDC_IDC_PG_IMPLANT_DTM: NORMAL
MDC_IDC_PG_MFG: NORMAL
MDC_IDC_PG_MODEL: NORMAL
MDC_IDC_PG_SERIAL: NORMAL
MDC_IDC_PG_TYPE: NORMAL
MDC_IDC_SESS_CLINIC_NAME: NORMAL
MDC_IDC_SESS_DTM: NORMAL
MDC_IDC_SESS_TYPE: NORMAL
MDC_IDC_SET_BRADY_AT_MODE_SWITCH_RATE: 171 {BEATS}/MIN
MDC_IDC_SET_BRADY_HYSTRATE: NORMAL
MDC_IDC_SET_BRADY_LOWRATE: 60 {BEATS}/MIN
MDC_IDC_SET_BRADY_MAX_SENSOR_RATE: 120 {BEATS}/MIN
MDC_IDC_SET_BRADY_MAX_TRACKING_RATE: 120 {BEATS}/MIN
MDC_IDC_SET_BRADY_MODE: NORMAL
MDC_IDC_SET_BRADY_PAV_DELAY_LOW: 220 MS
MDC_IDC_SET_BRADY_SAV_DELAY_LOW: 200 MS
MDC_IDC_SET_LEADCHNL_RA_PACING_AMPLITUDE: 1.5 V
MDC_IDC_SET_LEADCHNL_RA_PACING_ANODE_ELECTRODE_1: NORMAL
MDC_IDC_SET_LEADCHNL_RA_PACING_ANODE_LOCATION_1: NORMAL
MDC_IDC_SET_LEADCHNL_RA_PACING_CAPTURE_MODE: NORMAL
MDC_IDC_SET_LEADCHNL_RA_PACING_CATHODE_ELECTRODE_1: NORMAL
MDC_IDC_SET_LEADCHNL_RA_PACING_CATHODE_LOCATION_1: NORMAL
MDC_IDC_SET_LEADCHNL_RA_PACING_POLARITY: NORMAL
MDC_IDC_SET_LEADCHNL_RA_PACING_PULSEWIDTH: 0.4 MS
MDC_IDC_SET_LEADCHNL_RA_SENSING_ANODE_ELECTRODE_1: NORMAL
MDC_IDC_SET_LEADCHNL_RA_SENSING_ANODE_LOCATION_1: NORMAL
MDC_IDC_SET_LEADCHNL_RA_SENSING_CATHODE_ELECTRODE_1: NORMAL
MDC_IDC_SET_LEADCHNL_RA_SENSING_CATHODE_LOCATION_1: NORMAL
MDC_IDC_SET_LEADCHNL_RA_SENSING_POLARITY: NORMAL
MDC_IDC_SET_LEADCHNL_RA_SENSING_SENSITIVITY: 0.3 MV
MDC_IDC_SET_LEADCHNL_RV_PACING_AMPLITUDE: 2 V
MDC_IDC_SET_LEADCHNL_RV_PACING_ANODE_ELECTRODE_1: NORMAL
MDC_IDC_SET_LEADCHNL_RV_PACING_ANODE_LOCATION_1: NORMAL
MDC_IDC_SET_LEADCHNL_RV_PACING_CAPTURE_MODE: NORMAL
MDC_IDC_SET_LEADCHNL_RV_PACING_CATHODE_ELECTRODE_1: NORMAL
MDC_IDC_SET_LEADCHNL_RV_PACING_CATHODE_LOCATION_1: NORMAL
MDC_IDC_SET_LEADCHNL_RV_PACING_POLARITY: NORMAL
MDC_IDC_SET_LEADCHNL_RV_PACING_PULSEWIDTH: 0.4 MS
MDC_IDC_SET_LEADCHNL_RV_SENSING_ANODE_ELECTRODE_1: NORMAL
MDC_IDC_SET_LEADCHNL_RV_SENSING_ANODE_LOCATION_1: NORMAL
MDC_IDC_SET_LEADCHNL_RV_SENSING_CATHODE_ELECTRODE_1: NORMAL
MDC_IDC_SET_LEADCHNL_RV_SENSING_CATHODE_LOCATION_1: NORMAL
MDC_IDC_SET_LEADCHNL_RV_SENSING_POLARITY: NORMAL
MDC_IDC_SET_LEADCHNL_RV_SENSING_SENSITIVITY: 0.9 MV
MDC_IDC_SET_ZONE_DETECTION_INTERVAL: 350 MS
MDC_IDC_SET_ZONE_DETECTION_INTERVAL: 400 MS
MDC_IDC_SET_ZONE_TYPE: NORMAL
MDC_IDC_STAT_BRADY_AP_VP_PERCENT: 3.94 %
MDC_IDC_STAT_BRADY_AP_VS_PERCENT: 1.88 %
MDC_IDC_STAT_BRADY_AS_VP_PERCENT: 5.45 %
MDC_IDC_STAT_BRADY_AS_VS_PERCENT: 88.74 %
MDC_IDC_STAT_BRADY_DTM_END: NORMAL
MDC_IDC_STAT_BRADY_DTM_START: NORMAL
MDC_IDC_STAT_BRADY_RA_PERCENT_PACED: 5.83 %
MDC_IDC_STAT_BRADY_RV_PERCENT_PACED: 9.41 %
MDC_IDC_STAT_EPISODE_RECENT_COUNT: 0
MDC_IDC_STAT_EPISODE_RECENT_COUNT: 0
MDC_IDC_STAT_EPISODE_RECENT_COUNT: 28
MDC_IDC_STAT_EPISODE_RECENT_COUNT_DTM_END: NORMAL
MDC_IDC_STAT_EPISODE_RECENT_COUNT_DTM_START: NORMAL
MDC_IDC_STAT_EPISODE_TOTAL_COUNT: 0
MDC_IDC_STAT_EPISODE_TOTAL_COUNT: 0
MDC_IDC_STAT_EPISODE_TOTAL_COUNT: 219
MDC_IDC_STAT_EPISODE_TOTAL_COUNT_DTM_END: NORMAL
MDC_IDC_STAT_EPISODE_TOTAL_COUNT_DTM_START: NORMAL
MDC_IDC_STAT_EPISODE_TYPE: NORMAL

## 2021-07-08 ENCOUNTER — TELEPHONE (OUTPATIENT)
Dept: FAMILY MEDICINE | Facility: CLINIC | Age: 81
End: 2021-07-08

## 2021-07-08 NOTE — TELEPHONE ENCOUNTER
----- Message from Maryuri Melton RN sent at 7/7/2021 12:20 PM CDT -----  Please contact patient and assist with scheduling appointment - in-clinic visit, needs labs    Patient is due for a Diabetes Management follow up appointment with Dr. Velazquez  Due: overdue, so please try and get this scheduled for July     Thank you!  Maryuri

## 2021-07-26 ENCOUNTER — OFFICE VISIT (OUTPATIENT)
Dept: FAMILY MEDICINE | Facility: CLINIC | Age: 81
End: 2021-07-26
Payer: MEDICARE

## 2021-07-26 VITALS
BODY MASS INDEX: 24.54 KG/M2 | TEMPERATURE: 97.1 F | HEART RATE: 93 BPM | RESPIRATION RATE: 15 BRPM | DIASTOLIC BLOOD PRESSURE: 79 MMHG | WEIGHT: 125 LBS | SYSTOLIC BLOOD PRESSURE: 153 MMHG | OXYGEN SATURATION: 99 % | HEIGHT: 60 IN

## 2021-07-26 DIAGNOSIS — E11.59 TYPE 2 DIABETES MELLITUS WITH OTHER CIRCULATORY COMPLICATION, UNSPECIFIED WHETHER LONG TERM INSULIN USE (H): Primary | ICD-10-CM

## 2021-07-26 DIAGNOSIS — I10 ESSENTIAL HYPERTENSION: ICD-10-CM

## 2021-07-26 DIAGNOSIS — R41.3 MEMORY LOSS: ICD-10-CM

## 2021-07-26 LAB — HBA1C MFR BLD: 6.4 % (ref 0–5.6)

## 2021-07-26 RX ORDER — ASPIRIN 81 MG/1
81 TABLET, CHEWABLE ORAL DAILY
Qty: 90 TABLET | Refills: 3 | Status: SHIPPED | OUTPATIENT
Start: 2021-07-26 | End: 2022-10-28

## 2021-07-26 ASSESSMENT — MIFFLIN-ST. JEOR: SCORE: 963.5

## 2021-07-26 NOTE — PROGRESS NOTES
SUBJECTIVE:   Ashley Lynn is a 80 year old female who presents to clinic today to discuss the following problem(s).    # Type 2 Diabetes Mellitus  Lab Results   Component Value Date    A1C 6.8 10/07/2020     Lab Results   Component Value Date    CR 1.1 10/07/2020     Albumin Urine mg/g Cr (mg/g Cr)   Date Value   10/07/2020 136.05 (H)     Creatinine Urine (mg/dL)   Date Value   10/07/2020 172     - Current Regimen: Diet and lifestyle   - Missed doses: N/A  - Self monitoring blood gluose?: Yes.   - Hypoglycemic episodes?: No    Wt Readings from Last 5 Encounters:   11/04/20 53.1 kg (117 lb)   10/29/20 53.4 kg (117 lb 12 oz)   - Exercise: walking  - Diet: good  - Last eye exam: No  - Last foot exam: No  - Last dental exam: Dentures    Recent Labs   Lab Test 10/07/20  1128   CHOL 203.0*   HDL 53.0   .0   TRIG 198.0*   CHOLHDLRATIO 3.8   - History of ASCVD?: No  - On ASA and statin?: Simvastatin 40mg daily; no ASA    # Memory Loss   - patient's son has contacted me with concerns for possible memory loss  - he reports Ashley has low suspicion for this  - Mini-Cog test administered today with a score of 2 for 2 of three words recalled     # Elevated blood pressure  - elevated in clinic today  - patient's son reports they check AM pressures and are consistently within normal limits: 120s/80s  - Ashley denies any symptoms of chest pain, shortness of breath, headaches, hearing or vision changes     ROS: 10 point ROS neg other than the symptoms noted above in the HPI.      Today's PHQ-2:  PHQ-2 ( 1999 Pfizer) 7/26/2021 10/29/2020   Q1: Little interest or pleasure in doing things 0 0   Q2: Feeling down, depressed or hopeless 1 0   PHQ-2 Score 1 0       Past Medical History:   Diagnosis Date     Cardiac pacemaker in situ 10/07/2020     Type 2 diabetes mellitus without complication, without long-term current use of insulin (H) 10/07/2020     Past Surgical History:   Procedure Laterality Date     IMPLANT PACEMAKER        History reviewed. No pertinent family history.  Social History     Tobacco Use     Smoking status: Never Smoker     Smokeless tobacco: Never Used   Substance Use Topics     Alcohol use: Never     Drug use: Not on file     Social History     Social History Narrative     Not on file       Current Outpatient Medications   Medication     amLODIPine (NORVASC) 10 MG tablet     Continuous Blood Gluc  (FREESTYLE ANURAG 14 DAY READER) STEVIE     Continuous Blood Gluc Sensor (FREESTYLE ANURAG 14 DAY SENSOR) MISC     famotidine (PEPCID) 20 MG tablet     lisinopril (ZESTRIL) 20 MG tablet     Multiple Vitamins-Minerals (MULTIVITAMIN ADULT PO)     simvastatin (ZOCOR) 40 MG tablet     No current facility-administered medications for this visit.     I have reviewed the patient's past medical, surgical, family, and social history.     OBJECTIVE:   BP (!) 153/79   Pulse 93   Temp 97.1  F (36.2  C) (Skin)   Resp 15   Ht 1.524 m (5')   Wt 56.7 kg (125 lb)   SpO2 99%   BMI 24.41 kg/m       BP remains elevated on repeat check    Constitutional: well-appearing, appears stated age  Eyes: conjunctivae without erythema, sclera anicteric.   Cardiac: regular rate and rhythm, normal S1/S2, no murmur/rubs/gallops  Respiratory: lungs clear to auscultation bilaterally, normal work of breathing, no wheezes/crackles  Skin: no rashes, lesions, or wounds  Psych: affect is full and appropriate, speech is fluent and non-pressured    ASSESSMENT AND PLAN:     Ashley was seen today for diabetes.    Diagnoses and all orders for this visit:    Type 2 diabetes mellitus with other circulatory complication, unspecified whether long term insulin use (H)  -     Hemoglobin A1c; Future  -     aspirin (ASA) 81 MG chewable tablet; Take 1 tablet (81 mg) by mouth daily  -     Hemoglobin A1c    Essential hypertension    Memory loss  -     Occupation Therapy Referral; Future    Will check A1c today and start baby aspirin. Plan for recheck in 6 months.      Son and patient plan to check BP when they get home. Could consider returning for a nurse visit to calibrate with our BP cuff.     Mini-Cog score of 2 for 2 of 3 words remembered and inability to draw clock. Will refer to OT at this point for further assessment and consideration of possible referral to neurology for management options.     37 minutes spent on the date of the encounter doing chart review, history and exam, documentation and further activities as noted above.       Nicholas Velazquez MD  [facility]  07/26/2021, 8:12 AM

## 2021-07-26 NOTE — NURSING NOTE
80 year old  Chief Complaint   Patient presents with     Diabetes     check in        Blood pressure (!) 150/73, pulse 93, temperature 97.1  F (36.2  C), temperature source Skin, resp. rate 15, height 1.524 m (5'), weight 56.7 kg (125 lb), SpO2 99 %. Body mass index is 24.41 kg/m .  Patient Active Problem List   Diagnosis     Type 2 diabetes mellitus with other circulatory complication, unspecified whether long term insulin use (H)     Gait instability     Bradycardia     Cardiac pacemaker in situ     Essential hypertension       Wt Readings from Last 2 Encounters:   07/26/21 56.7 kg (125 lb)   11/04/20 53.1 kg (117 lb)     BP Readings from Last 3 Encounters:   07/26/21 (!) 150/73   11/04/20 116/75   10/29/20 136/82         Current Outpatient Medications   Medication     amLODIPine (NORVASC) 10 MG tablet     Continuous Blood Gluc  (FREESTYLE ANURAG 14 DAY READER) STEVIE     Continuous Blood Gluc Sensor (FREESTYLE ANURAG 14 DAY SENSOR) MISC     famotidine (PEPCID) 20 MG tablet     lisinopril (ZESTRIL) 20 MG tablet     Multiple Vitamins-Minerals (MULTIVITAMIN ADULT PO)     simvastatin (ZOCOR) 40 MG tablet     No current facility-administered medications for this visit.       Social History     Tobacco Use     Smoking status: Never Smoker     Smokeless tobacco: Never Used   Substance Use Topics     Alcohol use: Never     Drug use: None       Health Maintenance Due   Topic Date Due     ADVANCE CARE PLANNING  Never done     EYE EXAM  Never done     HEPATITIS C SCREENING  Never done     DTAP/TDAP/TD IMMUNIZATION (1 - Tdap) Never done     ZOSTER IMMUNIZATION (1 of 2) Never done     MEDICARE ANNUAL WELLNESS VISIT  Never done     A1C  01/07/2021       No results found for: PAP      July 26, 2021 2:16 PM

## 2021-08-18 NOTE — TELEPHONE ENCOUNTER
[facility]    Phone Message    May a detailed message be left on voicemail: yes     Reason for Call: Other: The patient's son said they got a missed call from the clinic they did not know the reason for the clinic call, maybe something, to do with the labs from 10/7/20 please review and call patient back to address concerns thank you.     Action Taken: Message routed to:  [facility]: [facility]    Travel Screening: Not Applicable                                                                      
No

## 2021-08-30 ENCOUNTER — HOSPITAL ENCOUNTER (OUTPATIENT)
Dept: OCCUPATIONAL THERAPY | Facility: CLINIC | Age: 81
Setting detail: THERAPIES SERIES
End: 2021-08-30
Attending: FAMILY MEDICINE
Payer: MEDICARE

## 2021-08-30 DIAGNOSIS — R41.3 MEMORY LOSS: ICD-10-CM

## 2021-08-30 PROCEDURE — 97165 OT EVAL LOW COMPLEX 30 MIN: CPT | Mod: GO | Performed by: OCCUPATIONAL THERAPIST

## 2021-08-30 PROCEDURE — 96125 COGNITIVE TEST BY HC PRO: CPT | Mod: GO,59 | Performed by: OCCUPATIONAL THERAPIST

## 2021-08-30 NOTE — PROGRESS NOTES
08/30/21 1200   Quick Adds   Quick Adds Certification   Type of Visit Initial Outpatient Occupational Therapy Evaluation   General Information   Start Of Care Date 08/30/21   Referring Physician Nicholas Velazquez MD   Orders Evaluate and treat as indicated   Other Orders Cognitive Assessment   Orders Date 07/26/21   Medical Diagnosis H/o DMll, bradycardia with pacemaker and CVI in 2020 (per son).   Pt presents with decline in memory.   Onset of Illness/Injury or Date of Surgery 07/26/21   Surgical/Medical History Reviewed Yes   Additional Occupational Profile Info/Pertinent History of Current Problem Pt lives with son.  Moved to [place] from [place] in 2020.   Comments/Observations SonKhang, present during OT evaluation   Role/Living Environment   Current Community Support Family/friend caregiver  (lives iwth son)   Patient role/Employment history Retired   Community/Avocational Activities [place]   Current Living Environment [facility]   Number of Stairs Within Home 3 levels   Home/Community Accessibility Comments no concerns   Prior Level - Transfers Independent   Prior Level - Ambulation Independent   Prior Level - ADLS Independent   Prior Responsibilities - IADL Housekeeping;Laundry;Medication management   Role/Living Environment Comments Pt takes medicaations, independently.   Pain   Patient currently in pain No   Fall Risk Screen   Fall screen completed by OT   Have you fallen 2 or more times in the past year? No   Have you fallen and had an injury in the past year? No   Is patient a fall risk? No   Abuse Screen (yes response referral indicated)   Feels Unsafe at Home or Work/School no   Feels Threatened by Someone no   Does Anyone Try to Keep You From Having Contact with Others or Doing Things Outside Your Home? no   Physical Signs of Abuse Present no   Cognitive Status Examination   Orientation Orientation to person, place and time   Level of Consciousness Alert   Cognitive  Comment Pt scored 4.5/5.5 on CPT-see OT Progress Note for details.   Visual Perception   Visual Perception Wears glasses   Posture   Posture Not impaired   Hand Strength   Hand Dominance Right   Functional Mobility   Ambulation IND   Bathing   Level of [place] - Bathing independent   Upper Body Dressing   Level of [place]: Dress Upper Body independent   Lower Body Dressing   Level of [place]: Dress Lower Body independent   Toileting   Level of [place]: Toilet independent   Grooming   Level of [place]: Grooming independent   Eating/Self-Feeding   Level of [place]: Eating independent   Activity Tolerance   Activity Tolerance fair   Planned Therapy Interventions   Planned Therapy Interventions Cognitive performance testing   Adult OT Eval Goals   OT Eval Goals (Adult) 1    OT Goal 1   Goal Identifier 1 CPT   Goal Description Pt will verbalize understanding of results of cognitive assessment and functional implications for support and safety, prn.     Target Date 08/30/21   Date Met 08/30/21   Clinical Impression   Criteria for Skilled Therapeutic Interventions Met Yes, treatment indicated   OT Diagnosis decreased memory and cognition   Assessment of Occupational Performance 1-3 Performance Deficits   Identified Performance Deficits decreased memory and cognition   Clinical Decision Making (Complexity) Low complexity   Therapy Frequency 1x eval and tx   Predicted Duration of Therapy Intervention (days/wks) 1x eval and tx   Risks and Benefits of Treatment have been explained. Yes   Patient, Family & other staff in agreement with plan of care Yes   Clinical Impression Comments Pt presents for CPT cognitive assessment to determine score for assistance with support and safety, prn.  Please see OT progress Note for details on CPT.     Therapy Certification   Certification date from 08/30/21   Certification date to 08/30/21   Total Evaluation Time   OT Eval, Low Complexity Minutes (03979) 10

## 2021-08-30 NOTE — PROGRESS NOTES
"Cognitive Performance Test    SUMMARY OF TEST:    The Cognitive Performance Test (CPT) is a standardized performance-based assessment to measure working memory/executive function processing capacities that underlie functional performance. Subtasks include common basic and instrumental activities of daily living (ADL/IADL) which are rated based on the manner in which patients respond to task demands of varying complexity. The total CPT score describes a level of functioning that indicates how information is processed, implications for functional activities, potential safety risks and a recommended level of supervision or assist based on cognitive function. The highest total score on this test is in the range of 5.6 to 5.8.    DATE OF TESTIN2021    RESULTS OF TESTING:                                                                                         CPT Subtest Results    MEDBOX: 4.0/6 SHOP/GLOVES: 4.0/6 PHONE: NT/6   WASH:  5.0/5 TRAVEL: 5.0/6 TOAST: 5.0/5   DRESS: 5.0/5   TOTAL CPT SCORE:  28/33     Average CPT Score  4.6/5.5    INTERPRETATION OF TEST RESULTS:    Based on the Cognitive Performance Test, this patient scored at CPT Level 4.5.  See CPT Levels reference below.    Summary of functional cognitive status:   Medbox:  Pt initiated the task by putting one Fluidia pill in the \"Wednesday\" of the evening and morning pill box.  Needed task set-up and instructions.  Then, able to put the correct amount of pills in evening and morning with Fluidia and Zeefex.    Shop:  Needed the gloves exchanged.  Then, able to pay the exact amount for the pair of gloves.   Wash:  Completed accurately with initial instructions.  Toast: Completely accurately with initial instructions.  Dress:  Completed accurately with initial instructions and additional VC of \"would you take anything else?\".  Then, pt included the umbrella.  Travel:  Needed the map exchanged for the written directions.  Initially, walked past the set " of stairs.  Then, turned around and pointed out the staircase.    Factors affecting performance:  No additional problems noted    Recommendations:    Pt lives with son who assists with finances, driving, meal prep and grocery shopping.  Therapist recommended continuation of this. Therapist recommended pt have a routine to ensure she takes her medication on-time, performs daily hygiene tasks, eats well-balances meals and is oriented to day/time.  Pt and son reported a daily routine and this has helped pt remember to take medications. Therapist suggested extra supervision/assist with new learning or unfamiliar tasks and give specific instructions.                                          TIME ADMINISTERING TEST: 35 minutes    TIME FOR INTERPRETATION AND PREPARATION OF REPORT: 5 minutes    TOTAL TIME: 40 minutes      CPT Levels Reference:    Patient's Average CPT Score:  4.5                                                                                                                                                  Individual scores range along a continuum as outlined below.  In addition to cognitive status, other factors may affect safety in a home environment.  Please refer to specific recommendations for this patient.    ___5.6-5.8  Normal functioning (absence of cognitive-functional disability).  Independent in managing personal affairs, monitors and directs own behavior.  Uses complex information to carry out daily activities with safety and accuracy.    Proficient with instrumental activities of daily living (IADL) and learning new activity.  Problems are anticipated, errors are avoided, and consequences of actions are considered.      ___5.0   Mild cognitive-functional disability; deficits in working memory and executive thought processes. Difficulty using complex information. Problems may be observed with recent memory, judgment, reasoning and planning ahead. May be impulsive or have difficulty anticipating  "consequences.  Safety:  May require assistance to plan ahead; or to manage complex medication schedules, appointments or finances.  Hazardous activities may need to be monitored or limited.  ADL:  Mild functional decline.  Able to complete basic self-care and routine household tasks.  May have difficulty with complex daily tasks such as reading, writing, meal preparation, shopping or driving.   Learns through hands on teaching. Self-centered behavior or difficulty considering the needs of others may be seen related to trouble seeing the  whole picture\". Can appear disorganized or uninhibited.    _X__4.5  Mild to moderate cognitive-functional disability. Significant deficits in working memory and executive thought processes. Judgment, reasoning and planning show obvious impairment.  Distractible with inability to shift attention/actions given competing stimuli.  Difficulty with problem solving and managing details. Complex daily tasks performed with inconsistency, difficulty, or error.     Safety:  Medications should be monitored, stove use may require supervision, and driving ability may be affected.  Impaired safety awareness with inability to anticipate potential problems.  May not recognize or respond to emergent situations. Requires frequent check-in support.   ADL:  Mild difficulty with simple everyday self-care tasks. Benefits from structured, routine activity.  Will likely need reminders to complete tasks outside of the routine. Requires assistance with planning and IADL tasks like shopping and finances. Learns concrete tasks through repetition, but performance may not generalize. Tends to be impulsive with poor insight. Self centered behavior or inability to consider the needs of others is common.    ___4.0  Moderate cognitive-functional disability; abstract to concrete thought processes. Working memory and executive function impairments are obvious. Difficulty with planning and problem solving.  Behavior is " goal-directed, but unable to follow multi-step directions, is easily distracted, and may not recognize mistakes.  Inability to anticipate hazards or understand precautions.  Safety:  Recommend 24-hour supervision for safety. Supervision needed for medication management and for hazardous activities. May not be able to follow a restricted diet. Can get lost in unfamiliar surroundings. Generally, persons functioning at level 4 should not be driving.   ADL:  Some decline in quality or frequency of ADL.  [place] enhanced by use of a routine, simple concrete directions, and caregiver set-up of needed items. Complex tasks such as money or home management typically requires assistance.  Relies heavily on vision to guide behavior; will ignore objects/hazards not in plain sight and can be distracted by irrelevant objects. Often has poor insight.  Able to carry out social conversation and may verbally  cover  for deficits leading caregivers to believe they are capable of functioning independently.       ___3.5  Moderate cognitive-functional disability; increased cues needed for task completion. Aware of concrete task steps but needs prompting or cues to initiate and complete simple tasks. Attention span is limited, simple directions may need to be repeated, and re-focus to a topic or task may be required.  Safety:  24-hour supervision required for safety and for assistance with daily tasks. Assistance required with medications, and access to medication should be limited. Meals, nutrition and dietary restrictions need to be monitored.  All hazardous activities should be restricted or supervised. Should not drive. Prone to wandering and can become lost.  ADL:  Moderate functional decline. Familiar tasks usually requires set-up of supplies and directions to complete steps. May need objects handed to them for task initiation. Function best with a set schedule in familiar surroundings with familiar people. All complex tasks  must be done by others. Vocabulary is diminished and speech often unfocused.

## 2021-08-30 NOTE — PROGRESS NOTES
[facility]          OUTPATIENT OCCUPATIONAL THERAPY  EVALUATION  PLAN OF TREATMENT FOR OUTPATIENT REHABILITATION  (COMPLETE FOR INITIAL CLAIMS ONLY)  Patient's Last Name, First Name, M.I.  YOB: 1940  ShaneAshley NICOLE                        Provider's Name  [facility] Medical Record No.  [id number]                               Onset Date:     07/26/21   Start of Care Date:     08/30/21   Type:     ___PT   _X_OT   ___SLP Medical Diagnosis:     H/o DMll, bradycardia with pacemaker and CVI in 2020 (per son).   Pt presents with decline in memory.                          OT Diagnosis:     decreased memory and cognition Visits from SOC:  1   _________________________________________________________________________________  Plan of Treatment/Functional Goals:  Cognitive performance testing                    Goals  Goal Identifier: 1 CPT  Goal Description: Pt will verbalize understanding of results of cognitive assessment and functional implications for support and safety, prn.    Target Date: 08/30/21  Date Met: 08/30/21                                                                                           Therapy Frequency: 1x eval and tx     Predicted Duration of Therapy Intervention (days/wks): 1x eval and tx  Ksenia Niño OT          I CERTIFY THE NEED FOR THESE SERVICES FURNISHED UNDER        THIS PLAN OF TREATMENT AND WHILE UNDER MY CARE     (Physician co-signature of this document indicates review and certification of the therapy plan).                 ,    Certification date from: 08/30/21, Certification date to: 08/30/21               Referring Physician: Nicholas Velazquez MD     Initial Assessment        See Epic Evaluation      Start Of Care Date: 08/30/21

## 2021-09-02 ENCOUNTER — ANCILLARY PROCEDURE (OUTPATIENT)
Dept: CARDIOLOGY | Facility: CLINIC | Age: 81
End: 2021-09-02
Attending: INTERNAL MEDICINE
Payer: MEDICARE

## 2021-09-02 DIAGNOSIS — R00.1 BRADYCARDIA: ICD-10-CM

## 2021-09-02 PROCEDURE — 93294 REM INTERROG EVL PM/LDLS PM: CPT | Performed by: INTERNAL MEDICINE

## 2021-09-02 PROCEDURE — 93296 REM INTERROG EVL PM/IDS: CPT

## 2021-09-17 LAB
MDC_IDC_EPISODE_DTM: NORMAL
MDC_IDC_EPISODE_DURATION: 106 S
MDC_IDC_EPISODE_DURATION: 13 S
MDC_IDC_EPISODE_DURATION: 180 S
MDC_IDC_EPISODE_DURATION: 181 S
MDC_IDC_EPISODE_DURATION: 182 S
MDC_IDC_EPISODE_DURATION: 203 S
MDC_IDC_EPISODE_DURATION: 24 S
MDC_IDC_EPISODE_DURATION: 3 S
MDC_IDC_EPISODE_DURATION: 31 S
MDC_IDC_EPISODE_DURATION: 33 S
MDC_IDC_EPISODE_DURATION: 40 S
MDC_IDC_EPISODE_DURATION: 47 S
MDC_IDC_EPISODE_DURATION: 49 S
MDC_IDC_EPISODE_DURATION: 5 S
MDC_IDC_EPISODE_DURATION: 85 S
MDC_IDC_EPISODE_ID: 220
MDC_IDC_EPISODE_ID: 221
MDC_IDC_EPISODE_ID: 222
MDC_IDC_EPISODE_ID: 223
MDC_IDC_EPISODE_ID: 224
MDC_IDC_EPISODE_ID: 225
MDC_IDC_EPISODE_ID: 226
MDC_IDC_EPISODE_ID: 227
MDC_IDC_EPISODE_ID: 228
MDC_IDC_EPISODE_ID: 229
MDC_IDC_EPISODE_ID: 230
MDC_IDC_EPISODE_ID: 231
MDC_IDC_EPISODE_ID: 232
MDC_IDC_EPISODE_ID: 233
MDC_IDC_EPISODE_ID: 234
MDC_IDC_EPISODE_ID: 235
MDC_IDC_EPISODE_ID: 236
MDC_IDC_EPISODE_ID: 237
MDC_IDC_EPISODE_TYPE: NORMAL
MDC_IDC_LEAD_IMPLANT_DT: NORMAL
MDC_IDC_LEAD_IMPLANT_DT: NORMAL
MDC_IDC_LEAD_LOCATION: NORMAL
MDC_IDC_LEAD_LOCATION: NORMAL
MDC_IDC_LEAD_LOCATION_DETAIL_1: NORMAL
MDC_IDC_LEAD_LOCATION_DETAIL_1: NORMAL
MDC_IDC_LEAD_MFG: NORMAL
MDC_IDC_LEAD_MFG: NORMAL
MDC_IDC_LEAD_MODEL: NORMAL
MDC_IDC_LEAD_MODEL: NORMAL
MDC_IDC_LEAD_POLARITY_TYPE: NORMAL
MDC_IDC_LEAD_POLARITY_TYPE: NORMAL
MDC_IDC_LEAD_SERIAL: NORMAL
MDC_IDC_LEAD_SERIAL: NORMAL
MDC_IDC_LEAD_SPECIAL_FUNCTION: NORMAL
MDC_IDC_LEAD_SPECIAL_FUNCTION: NORMAL
MDC_IDC_MSMT_BATTERY_DTM: NORMAL
MDC_IDC_MSMT_BATTERY_REMAINING_LONGEVITY: 157 MO
MDC_IDC_MSMT_BATTERY_RRT_TRIGGER: 2.62
MDC_IDC_MSMT_BATTERY_STATUS: NORMAL
MDC_IDC_MSMT_BATTERY_VOLTAGE: 3.07 V
MDC_IDC_MSMT_LEADCHNL_RA_IMPEDANCE_VALUE: 532 OHM
MDC_IDC_MSMT_LEADCHNL_RA_IMPEDANCE_VALUE: 665 OHM
MDC_IDC_MSMT_LEADCHNL_RA_PACING_THRESHOLD_AMPLITUDE: 0.62 V
MDC_IDC_MSMT_LEADCHNL_RA_PACING_THRESHOLD_PULSEWIDTH: 0.4 MS
MDC_IDC_MSMT_LEADCHNL_RA_SENSING_INTR_AMPL: 3.5 MV
MDC_IDC_MSMT_LEADCHNL_RA_SENSING_INTR_AMPL: 3.5 MV
MDC_IDC_MSMT_LEADCHNL_RV_IMPEDANCE_VALUE: 342 OHM
MDC_IDC_MSMT_LEADCHNL_RV_IMPEDANCE_VALUE: 456 OHM
MDC_IDC_MSMT_LEADCHNL_RV_PACING_THRESHOLD_AMPLITUDE: 0.75 V
MDC_IDC_MSMT_LEADCHNL_RV_PACING_THRESHOLD_PULSEWIDTH: 0.4 MS
MDC_IDC_MSMT_LEADCHNL_RV_SENSING_INTR_AMPL: 2 MV
MDC_IDC_MSMT_LEADCHNL_RV_SENSING_INTR_AMPL: 2 MV
MDC_IDC_PG_IMPLANT_DTM: NORMAL
MDC_IDC_PG_MFG: NORMAL
MDC_IDC_PG_MODEL: NORMAL
MDC_IDC_PG_SERIAL: NORMAL
MDC_IDC_PG_TYPE: NORMAL
MDC_IDC_SESS_CLINIC_NAME: NORMAL
MDC_IDC_SESS_DTM: NORMAL
MDC_IDC_SESS_TYPE: NORMAL
MDC_IDC_SET_BRADY_AT_MODE_SWITCH_RATE: 171 {BEATS}/MIN
MDC_IDC_SET_BRADY_HYSTRATE: NORMAL
MDC_IDC_SET_BRADY_LOWRATE: 60 {BEATS}/MIN
MDC_IDC_SET_BRADY_MAX_SENSOR_RATE: 120 {BEATS}/MIN
MDC_IDC_SET_BRADY_MAX_TRACKING_RATE: 120 {BEATS}/MIN
MDC_IDC_SET_BRADY_MODE: NORMAL
MDC_IDC_SET_BRADY_PAV_DELAY_LOW: 220 MS
MDC_IDC_SET_BRADY_SAV_DELAY_LOW: 200 MS
MDC_IDC_SET_LEADCHNL_RA_PACING_AMPLITUDE: 1.5 V
MDC_IDC_SET_LEADCHNL_RA_PACING_ANODE_ELECTRODE_1: NORMAL
MDC_IDC_SET_LEADCHNL_RA_PACING_ANODE_LOCATION_1: NORMAL
MDC_IDC_SET_LEADCHNL_RA_PACING_CAPTURE_MODE: NORMAL
MDC_IDC_SET_LEADCHNL_RA_PACING_CATHODE_ELECTRODE_1: NORMAL
MDC_IDC_SET_LEADCHNL_RA_PACING_CATHODE_LOCATION_1: NORMAL
MDC_IDC_SET_LEADCHNL_RA_PACING_POLARITY: NORMAL
MDC_IDC_SET_LEADCHNL_RA_PACING_PULSEWIDTH: 0.4 MS
MDC_IDC_SET_LEADCHNL_RA_SENSING_ANODE_ELECTRODE_1: NORMAL
MDC_IDC_SET_LEADCHNL_RA_SENSING_ANODE_LOCATION_1: NORMAL
MDC_IDC_SET_LEADCHNL_RA_SENSING_CATHODE_ELECTRODE_1: NORMAL
MDC_IDC_SET_LEADCHNL_RA_SENSING_CATHODE_LOCATION_1: NORMAL
MDC_IDC_SET_LEADCHNL_RA_SENSING_POLARITY: NORMAL
MDC_IDC_SET_LEADCHNL_RA_SENSING_SENSITIVITY: 0.3 MV
MDC_IDC_SET_LEADCHNL_RV_PACING_AMPLITUDE: 2 V
MDC_IDC_SET_LEADCHNL_RV_PACING_ANODE_ELECTRODE_1: NORMAL
MDC_IDC_SET_LEADCHNL_RV_PACING_ANODE_LOCATION_1: NORMAL
MDC_IDC_SET_LEADCHNL_RV_PACING_CAPTURE_MODE: NORMAL
MDC_IDC_SET_LEADCHNL_RV_PACING_CATHODE_ELECTRODE_1: NORMAL
MDC_IDC_SET_LEADCHNL_RV_PACING_CATHODE_LOCATION_1: NORMAL
MDC_IDC_SET_LEADCHNL_RV_PACING_POLARITY: NORMAL
MDC_IDC_SET_LEADCHNL_RV_PACING_PULSEWIDTH: 0.4 MS
MDC_IDC_SET_LEADCHNL_RV_SENSING_ANODE_ELECTRODE_1: NORMAL
MDC_IDC_SET_LEADCHNL_RV_SENSING_ANODE_LOCATION_1: NORMAL
MDC_IDC_SET_LEADCHNL_RV_SENSING_CATHODE_ELECTRODE_1: NORMAL
MDC_IDC_SET_LEADCHNL_RV_SENSING_CATHODE_LOCATION_1: NORMAL
MDC_IDC_SET_LEADCHNL_RV_SENSING_POLARITY: NORMAL
MDC_IDC_SET_LEADCHNL_RV_SENSING_SENSITIVITY: 0.9 MV
MDC_IDC_SET_ZONE_DETECTION_INTERVAL: 350 MS
MDC_IDC_SET_ZONE_DETECTION_INTERVAL: 400 MS
MDC_IDC_SET_ZONE_TYPE: NORMAL
MDC_IDC_STAT_AT_BURDEN_PERCENT: 0 %
MDC_IDC_STAT_AT_DTM_END: NORMAL
MDC_IDC_STAT_AT_DTM_START: NORMAL
MDC_IDC_STAT_BRADY_AP_VP_PERCENT: 1.55 %
MDC_IDC_STAT_BRADY_AP_VS_PERCENT: 1.53 %
MDC_IDC_STAT_BRADY_AS_VP_PERCENT: 4.2 %
MDC_IDC_STAT_BRADY_AS_VS_PERCENT: 92.72 %
MDC_IDC_STAT_BRADY_DTM_END: NORMAL
MDC_IDC_STAT_BRADY_DTM_START: NORMAL
MDC_IDC_STAT_BRADY_RA_PERCENT_PACED: 3.08 %
MDC_IDC_STAT_BRADY_RV_PERCENT_PACED: 5.76 %
MDC_IDC_STAT_EPISODE_RECENT_COUNT: 0
MDC_IDC_STAT_EPISODE_RECENT_COUNT: 18
MDC_IDC_STAT_EPISODE_RECENT_COUNT_DTM_END: NORMAL
MDC_IDC_STAT_EPISODE_RECENT_COUNT_DTM_START: NORMAL
MDC_IDC_STAT_EPISODE_TOTAL_COUNT: 0
MDC_IDC_STAT_EPISODE_TOTAL_COUNT: 237
MDC_IDC_STAT_EPISODE_TOTAL_COUNT_DTM_END: NORMAL
MDC_IDC_STAT_EPISODE_TOTAL_COUNT_DTM_START: NORMAL
MDC_IDC_STAT_EPISODE_TYPE: NORMAL

## 2021-09-27 ENCOUNTER — OFFICE VISIT (OUTPATIENT)
Dept: CARDIOLOGY | Facility: CLINIC | Age: 81
End: 2021-09-27
Attending: INTERNAL MEDICINE
Payer: MEDICARE

## 2021-09-27 VITALS
WEIGHT: 125.7 LBS | HEART RATE: 95 BPM | HEIGHT: 60 IN | SYSTOLIC BLOOD PRESSURE: 147 MMHG | BODY MASS INDEX: 24.68 KG/M2 | DIASTOLIC BLOOD PRESSURE: 78 MMHG | OXYGEN SATURATION: 99 %

## 2021-09-27 DIAGNOSIS — R00.1 BRADYCARDIA: ICD-10-CM

## 2021-09-27 DIAGNOSIS — Z95.0 CARDIAC PACEMAKER IN SITU: ICD-10-CM

## 2021-09-27 DIAGNOSIS — I10 ESSENTIAL HYPERTENSION: Primary | ICD-10-CM

## 2021-09-27 PROCEDURE — G0463 HOSPITAL OUTPT CLINIC VISIT: HCPCS | Mod: 25

## 2021-09-27 PROCEDURE — 99214 OFFICE O/P EST MOD 30 MIN: CPT | Performed by: INTERNAL MEDICINE

## 2021-09-27 ASSESSMENT — ENCOUNTER SYMPTOMS
NECK MASS: 1
FEVER: 0
JAUNDICE: 0
WEIGHT GAIN: 0
POOR WOUND HEALING: 0
ARTHRALGIAS: 0
SMELL DISTURBANCE: 0
INCREASED ENERGY: 0
MUSCLE CRAMPS: 0
HOARSE VOICE: 0
BACK PAIN: 0
DIFFICULTY URINATING: 0
DIARRHEA: 0
SORE THROAT: 0
FLANK PAIN: 0
DYSURIA: 0
HEARTBURN: 0
INSOMNIA: 0
DECREASED CONCENTRATION: 1
DEPRESSION: 1
SINUS CONGESTION: 0
NERVOUS/ANXIOUS: 1
DECREASED APPETITE: 0
RECTAL PAIN: 0
POLYPHAGIA: 0
NAUSEA: 0
WEIGHT LOSS: 0
FATIGUE: 0
STIFFNESS: 0
BOWEL INCONTINENCE: 0
HALLUCINATIONS: 0
NIGHT SWEATS: 1
NECK PAIN: 1
ALTERED TEMPERATURE REGULATION: 1
SINUS PAIN: 0
CONSTIPATION: 1
BLOOD IN STOOL: 0
CHILLS: 0
TROUBLE SWALLOWING: 0
VOMITING: 0
TASTE DISTURBANCE: 0
JOINT SWELLING: 0
ABDOMINAL PAIN: 0
POLYDIPSIA: 0
SKIN CHANGES: 0
NAIL CHANGES: 0
MYALGIAS: 0
BLOATING: 0
MUSCLE WEAKNESS: 0
HEMATURIA: 0
PANIC: 0

## 2021-09-27 ASSESSMENT — PAIN SCALES - GENERAL: PAINLEVEL: NO PAIN (0)

## 2021-09-27 ASSESSMENT — MIFFLIN-ST. JEOR: SCORE: 961.67

## 2021-09-27 NOTE — LETTER
9/27/2021      RE: Ashley Lynn  [address]  [facility] [address]       Dear Colleague,    Thank you for the opportunity to participate in the care of your patient, Ashley Lynn, at the [facility] [place] [facility]. Please see a copy of my visit note below.    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   She was referred to establish a cardiology care.  She has been doing well.  She denied any chest pain, SOB, palpitation or dizsiness.    PAST MEDICAL HISTORY:  Past Medical History:   Diagnosis Date     Cardiac pacemaker in situ 10/07/2020     Type 2 diabetes mellitus without complication, without long-term current use of insulin (H) 10/07/2020       CURRENT MEDICATIONS:  Current Outpatient Medications   Medication Sig Dispense Refill     amLODIPine (NORVASC) 10 MG tablet Take 1 tablet (10 mg) by mouth daily 90 tablet 2     aspirin (ASA) 81 MG chewable tablet Take 1 tablet (81 mg) by mouth daily 90 tablet 3     Continuous Blood Gluc  (FREESTYLE ANURAG 14 DAY READER) STEVIE Use to read blood sugars as per 's instructions. 1 each 0     Continuous Blood Gluc Sensor (FREESTYLE ANURAG 14 DAY SENSOR) MISC Change every 14 days. 2 each 6     famotidine (PEPCID) 20 MG tablet Take 20 mg by mouth 2 times daily       lisinopril (ZESTRIL) 20 MG tablet Take 1 tablet (20 mg) by mouth daily 90 tablet 2     Multiple Vitamins-Minerals (MULTIVITAMIN ADULT PO)        simvastatin (ZOCOR) 40 MG tablet Take 1 tablet (40 mg) by mouth At Bedtime 90 tablet 3       PAST SURGICAL HISTORY:  Past Surgical History:   Procedure Laterality Date     IMPLANT PACEMAKER         ALLERGIES:   No Known Allergies    FAMILY HISTORY:  - Premature coronary artery disease  - Atrial fibrillation  - Sudden cardiac death     SOCIAL HISTORY:  Social History  "    Tobacco Use     Smoking status: Never Smoker     Smokeless tobacco: Never Used   Substance Use Topics     Alcohol use: Never     Drug use: None       ROS:   Answers for HPI/ROS submitted by the patient on 9/27/2021 were reviewed.  Constitutional: No fever, chills, or sweats. Weight stable.   ENT: No visual disturbance, ear ache, epistaxis, sore throat.   Cardiovascular: As per HPI.   Respiratory: No cough, hemoptysis.    GI: No nausea, vomiting, hematemesis, melena, or hematochezia.   : No hematuria.   Integument: Negative.   Psychiatric: Negative.   Hematologic:  Easy bruising, no easy bleeding.  Neuro: Negative.   Endocrinology: No significant heat or cold intolerance   Musculoskeletal: No myalgia.    Exam:  BP (!) 147/78 (BP Location: Left arm, Patient Position: Chair, Cuff Size: Adult Regular)   Pulse 95   Ht 1.516 m (4' 11.69\")   Wt 57 kg (125 lb 11.2 oz)   SpO2 99%   BMI 24.81 kg/m    GENERAL APPEARANCE: healthy, alert and no distress  HEENT: no icterus, no xanthelasmas, normal pupil size and reaction, normal palate, mucosa moist, no central cyanosis  NECK: no adenopathy, no asymmetry, masses, or scars, thyroid normal to palpation and no bruits, JVP not elevated  RESPIRATORY: lungs clear to auscultation - no rales, rhonchi or wheezes, no use of accessory muscles, no retractions, respirations are unlabored, normal respiratory rate  CARDIOVASCULAR: regular rhythm, normal S1 with physiologic split S2, no S3 or S4 and no murmur, click or rub, precordium quiet with normal PMI.  ABDOMEN: soft, non tender, without hepatosplenomegaly, no masses palpable, bowel sounds normal, aorta not enlarged by palpation, no abdominal bruits  EXTREMITIES: peripheral pulses normal, no edema, no bruits  NEURO: alert and oriented to person/place/time, normal speech, gait and affect  VASC: Radial, femoral, dorsalis pedis and posterior tibialis pulses are normal in volumes and symmetric bilaterally. No bruits are heard.  SKIN: " no ecchymoses, no rashes    Labs:  CBC RESULTS:   No results found for: WBC, RBC, HGB, HCT, MCV, MCH, MCHC, RDW, PLT    BMP RESULTS:  Lab Results   Component Value Date    .0 10/07/2020    POTASSIUM 4.3 10/07/2020    CHLORIDE 102.0 10/07/2020    CO2 28.0 10/07/2020    GLC 77.0 10/07/2020    BUN 14.0 10/07/2020    CR 1.1 10/07/2020    YOGI 9.1 10/07/2020        INR RESULTS:  No results found for: INR    Procedures:  Pacemaker interrogation on 9/2/2021: Reviewed.  Remote pacemaker transmission received and reviewed. Device transmission sent per MD orders.     Device: Medtronic [id number] [place] XT DR CONTRERAS  Normal Device Function.  Mode: DDD  bpm  AP: 3.1%  : 5.8%  Presenting EGM: AS/VS @ 90 bpm  Short V-V intervals: 0  Lead Trends Appear Stable  Estimated battery longevity to RRT = 13.1 years.   Atrial Arrhythmia: 18 AT episodes lasting up to 3.5 minutes.  AF [place]: <0.1%  Anticoagulant: None  Ventricular Arrhythmia: None  Pt Notified of Transmission Results: My Chart. Dr. العراقي had previously asked pt to f/u with EP. She has been reminded to make that appointment.     Plan: Send a remote transmission in 3 months.  NORMAN Vo RN     Remote pacemaker transmission     I have reviewed and interpreted the device interrogation, settings, programming and nurse's summary. The device is functioning within normal device parameters. I agree with the current findings, assessment and plan.    Assessment and Plan:  # DM II  # HTN Well controlled on Norvasc 10 mg daily.  # Advanced AV block s/p MDT dual chamber pacemaker placement on 9/5/2020.  She has been doing well and her pacemaker has been functioning appropriately.  I advised her to continue the same medication.  I will see her back in 6 months.      I spent a total of 30 min today to review the records, see the patient, and complete the documents.    Please do not hesitate to contact me if you have any questions/concerns.     Sincerely,     Eileen Hilton,  MD      CC  Patient Care Team:  Nicholas Velazquez MD as PCP - General (Family Practice)  Marisela Campoverde RD as Diabetes Educator (Dietitian, Registered)  Daniel العراقي MD as Assigned Heart and Vascular Provider

## 2021-09-27 NOTE — NURSING NOTE
Chief Complaint   Patient presents with     New Patient     NEW Bradycardia, referral from Dr العراقي      Vitals were taken and medications reconciled.    Bryce Padilla, EMT  3:14 PM

## 2021-10-11 ENCOUNTER — HEALTH MAINTENANCE LETTER (OUTPATIENT)
Age: 81
End: 2021-10-11

## 2021-11-02 ENCOUNTER — APPOINTMENT (OUTPATIENT)
Dept: URGENT CARE | Facility: CLINIC | Age: 81
End: 2021-11-02
Payer: MEDICARE

## 2021-11-03 ENCOUNTER — MYC MEDICAL ADVICE (OUTPATIENT)
Dept: FAMILY MEDICINE | Facility: CLINIC | Age: 81
End: 2021-11-03

## 2021-11-03 DIAGNOSIS — E11.59 TYPE 2 DIABETES MELLITUS WITH OTHER CIRCULATORY COMPLICATION, UNSPECIFIED WHETHER LONG TERM INSULIN USE (H): ICD-10-CM

## 2021-11-03 RX ORDER — FLASH GLUCOSE SENSOR
KIT MISCELLANEOUS
Qty: 2 EACH | Refills: 6 | Status: SHIPPED | OUTPATIENT
Start: 2021-11-03 | End: 2022-05-17

## 2021-11-03 NOTE — TELEPHONE ENCOUNTER
Med refilled as noted below.     Diagnoses and all orders for this visit:    Type 2 diabetes mellitus with other circulatory complication, unspecified whether long term insulin use (H)  -     Continuous Blood Gluc Sensor (FREESTYLE ANURAG 14 DAY SENSOR) MISC; Change every 14 days.      Nicholas Velazquez MD  4:10 PM, November 3, 2021

## 2021-11-05 ENCOUNTER — IMMUNIZATION (OUTPATIENT)
Dept: NURSING | Facility: CLINIC | Age: 81
End: 2021-11-05
Payer: MEDICARE

## 2021-11-05 PROCEDURE — G0008 ADMIN INFLUENZA VIRUS VAC: HCPCS

## 2021-11-05 PROCEDURE — 91300 PR COVID VAC PFIZER DIL RECON 30 MCG/0.3 ML IM: CPT

## 2021-11-05 PROCEDURE — 90662 IIV NO PRSV INCREASED AG IM: CPT

## 2021-11-05 PROCEDURE — 0004A PR COVID VAC PFIZER DIL RECON 30 MCG/0.3 ML IM: CPT

## 2021-12-05 ENCOUNTER — HEALTH MAINTENANCE LETTER (OUTPATIENT)
Age: 81
End: 2021-12-05

## 2021-12-07 ENCOUNTER — ANCILLARY PROCEDURE (OUTPATIENT)
Dept: CARDIOLOGY | Facility: CLINIC | Age: 81
End: 2021-12-07
Attending: INTERNAL MEDICINE
Payer: MEDICARE

## 2021-12-07 DIAGNOSIS — R00.1 BRADYCARDIA: ICD-10-CM

## 2021-12-07 PROCEDURE — 93296 REM INTERROG EVL PM/IDS: CPT

## 2021-12-07 PROCEDURE — 93294 REM INTERROG EVL PM/LDLS PM: CPT | Performed by: INTERNAL MEDICINE

## 2021-12-08 ENCOUNTER — TRANSFERRED RECORDS (OUTPATIENT)
Dept: HEALTH INFORMATION MANAGEMENT | Facility: CLINIC | Age: 81
End: 2021-12-08
Payer: MEDICARE

## 2021-12-08 LAB — RETINOPATHY: NEGATIVE

## 2022-01-17 LAB
MDC_IDC_EPISODE_DTM: NORMAL
MDC_IDC_EPISODE_DURATION: 105 S
MDC_IDC_EPISODE_DURATION: 12 S
MDC_IDC_EPISODE_DURATION: 127 S
MDC_IDC_EPISODE_DURATION: 180 S
MDC_IDC_EPISODE_DURATION: 182 S
MDC_IDC_EPISODE_DURATION: 182 S
MDC_IDC_EPISODE_DURATION: 187 S
MDC_IDC_EPISODE_DURATION: 198 S
MDC_IDC_EPISODE_DURATION: 20 S
MDC_IDC_EPISODE_DURATION: 25 S
MDC_IDC_EPISODE_DURATION: 25 S
MDC_IDC_EPISODE_DURATION: 49 S
MDC_IDC_EPISODE_DURATION: 54 S
MDC_IDC_EPISODE_DURATION: 58 S
MDC_IDC_EPISODE_DURATION: 6 S
MDC_IDC_EPISODE_DURATION: 6 S
MDC_IDC_EPISODE_DURATION: 72 S
MDC_IDC_EPISODE_DURATION: 8 S
MDC_IDC_EPISODE_DURATION: 8 S
MDC_IDC_EPISODE_DURATION: 81 S
MDC_IDC_EPISODE_DURATION: 9 S
MDC_IDC_EPISODE_DURATION: 99 S
MDC_IDC_EPISODE_ID: 238
MDC_IDC_EPISODE_ID: 239
MDC_IDC_EPISODE_ID: 240
MDC_IDC_EPISODE_ID: 241
MDC_IDC_EPISODE_ID: 242
MDC_IDC_EPISODE_ID: 243
MDC_IDC_EPISODE_ID: 244
MDC_IDC_EPISODE_ID: 245
MDC_IDC_EPISODE_ID: 246
MDC_IDC_EPISODE_ID: 247
MDC_IDC_EPISODE_ID: 248
MDC_IDC_EPISODE_ID: 249
MDC_IDC_EPISODE_ID: 250
MDC_IDC_EPISODE_ID: 251
MDC_IDC_EPISODE_ID: 252
MDC_IDC_EPISODE_ID: 253
MDC_IDC_EPISODE_ID: 254
MDC_IDC_EPISODE_ID: 255
MDC_IDC_EPISODE_ID: 256
MDC_IDC_EPISODE_ID: 257
MDC_IDC_EPISODE_ID: 258
MDC_IDC_EPISODE_ID: 259
MDC_IDC_EPISODE_TYPE: NORMAL
MDC_IDC_LEAD_IMPLANT_DT: NORMAL
MDC_IDC_LEAD_IMPLANT_DT: NORMAL
MDC_IDC_LEAD_LOCATION: NORMAL
MDC_IDC_LEAD_LOCATION: NORMAL
MDC_IDC_LEAD_LOCATION_DETAIL_1: NORMAL
MDC_IDC_LEAD_LOCATION_DETAIL_1: NORMAL
MDC_IDC_LEAD_MFG: NORMAL
MDC_IDC_LEAD_MFG: NORMAL
MDC_IDC_LEAD_MODEL: NORMAL
MDC_IDC_LEAD_MODEL: NORMAL
MDC_IDC_LEAD_POLARITY_TYPE: NORMAL
MDC_IDC_LEAD_POLARITY_TYPE: NORMAL
MDC_IDC_LEAD_SERIAL: NORMAL
MDC_IDC_LEAD_SERIAL: NORMAL
MDC_IDC_LEAD_SPECIAL_FUNCTION: NORMAL
MDC_IDC_LEAD_SPECIAL_FUNCTION: NORMAL
MDC_IDC_MSMT_BATTERY_DTM: NORMAL
MDC_IDC_MSMT_BATTERY_REMAINING_LONGEVITY: 150 MO
MDC_IDC_MSMT_BATTERY_RRT_TRIGGER: 2.62
MDC_IDC_MSMT_BATTERY_STATUS: NORMAL
MDC_IDC_MSMT_BATTERY_VOLTAGE: 3.04 V
MDC_IDC_MSMT_LEADCHNL_RA_IMPEDANCE_VALUE: 475 OHM
MDC_IDC_MSMT_LEADCHNL_RA_IMPEDANCE_VALUE: 627 OHM
MDC_IDC_MSMT_LEADCHNL_RA_PACING_THRESHOLD_AMPLITUDE: 0.62 V
MDC_IDC_MSMT_LEADCHNL_RA_PACING_THRESHOLD_PULSEWIDTH: 0.4 MS
MDC_IDC_MSMT_LEADCHNL_RA_SENSING_INTR_AMPL: 3.38 MV
MDC_IDC_MSMT_LEADCHNL_RA_SENSING_INTR_AMPL: 3.38 MV
MDC_IDC_MSMT_LEADCHNL_RV_IMPEDANCE_VALUE: 323 OHM
MDC_IDC_MSMT_LEADCHNL_RV_IMPEDANCE_VALUE: 418 OHM
MDC_IDC_MSMT_LEADCHNL_RV_PACING_THRESHOLD_AMPLITUDE: 0.75 V
MDC_IDC_MSMT_LEADCHNL_RV_PACING_THRESHOLD_PULSEWIDTH: 0.4 MS
MDC_IDC_MSMT_LEADCHNL_RV_SENSING_INTR_AMPL: 2.12 MV
MDC_IDC_MSMT_LEADCHNL_RV_SENSING_INTR_AMPL: 2.12 MV
MDC_IDC_PG_IMPLANT_DTM: NORMAL
MDC_IDC_PG_MFG: NORMAL
MDC_IDC_PG_MODEL: NORMAL
MDC_IDC_PG_SERIAL: NORMAL
MDC_IDC_PG_TYPE: NORMAL
MDC_IDC_SESS_CLINIC_NAME: NORMAL
MDC_IDC_SESS_DTM: NORMAL
MDC_IDC_SESS_TYPE: NORMAL
MDC_IDC_SET_BRADY_AT_MODE_SWITCH_RATE: 171 {BEATS}/MIN
MDC_IDC_SET_BRADY_HYSTRATE: NORMAL
MDC_IDC_SET_BRADY_LOWRATE: 60 {BEATS}/MIN
MDC_IDC_SET_BRADY_MAX_SENSOR_RATE: 120 {BEATS}/MIN
MDC_IDC_SET_BRADY_MAX_TRACKING_RATE: 120 {BEATS}/MIN
MDC_IDC_SET_BRADY_MODE: NORMAL
MDC_IDC_SET_BRADY_PAV_DELAY_LOW: 220 MS
MDC_IDC_SET_BRADY_SAV_DELAY_LOW: 200 MS
MDC_IDC_SET_LEADCHNL_RA_PACING_AMPLITUDE: 1.5 V
MDC_IDC_SET_LEADCHNL_RA_PACING_ANODE_ELECTRODE_1: NORMAL
MDC_IDC_SET_LEADCHNL_RA_PACING_ANODE_LOCATION_1: NORMAL
MDC_IDC_SET_LEADCHNL_RA_PACING_CAPTURE_MODE: NORMAL
MDC_IDC_SET_LEADCHNL_RA_PACING_CATHODE_ELECTRODE_1: NORMAL
MDC_IDC_SET_LEADCHNL_RA_PACING_CATHODE_LOCATION_1: NORMAL
MDC_IDC_SET_LEADCHNL_RA_PACING_POLARITY: NORMAL
MDC_IDC_SET_LEADCHNL_RA_PACING_PULSEWIDTH: 0.4 MS
MDC_IDC_SET_LEADCHNL_RA_SENSING_ANODE_ELECTRODE_1: NORMAL
MDC_IDC_SET_LEADCHNL_RA_SENSING_ANODE_LOCATION_1: NORMAL
MDC_IDC_SET_LEADCHNL_RA_SENSING_CATHODE_ELECTRODE_1: NORMAL
MDC_IDC_SET_LEADCHNL_RA_SENSING_CATHODE_LOCATION_1: NORMAL
MDC_IDC_SET_LEADCHNL_RA_SENSING_POLARITY: NORMAL
MDC_IDC_SET_LEADCHNL_RA_SENSING_SENSITIVITY: 0.3 MV
MDC_IDC_SET_LEADCHNL_RV_PACING_AMPLITUDE: 2 V
MDC_IDC_SET_LEADCHNL_RV_PACING_ANODE_ELECTRODE_1: NORMAL
MDC_IDC_SET_LEADCHNL_RV_PACING_ANODE_LOCATION_1: NORMAL
MDC_IDC_SET_LEADCHNL_RV_PACING_CAPTURE_MODE: NORMAL
MDC_IDC_SET_LEADCHNL_RV_PACING_CATHODE_ELECTRODE_1: NORMAL
MDC_IDC_SET_LEADCHNL_RV_PACING_CATHODE_LOCATION_1: NORMAL
MDC_IDC_SET_LEADCHNL_RV_PACING_POLARITY: NORMAL
MDC_IDC_SET_LEADCHNL_RV_PACING_PULSEWIDTH: 0.4 MS
MDC_IDC_SET_LEADCHNL_RV_SENSING_ANODE_ELECTRODE_1: NORMAL
MDC_IDC_SET_LEADCHNL_RV_SENSING_ANODE_LOCATION_1: NORMAL
MDC_IDC_SET_LEADCHNL_RV_SENSING_CATHODE_ELECTRODE_1: NORMAL
MDC_IDC_SET_LEADCHNL_RV_SENSING_CATHODE_LOCATION_1: NORMAL
MDC_IDC_SET_LEADCHNL_RV_SENSING_POLARITY: NORMAL
MDC_IDC_SET_LEADCHNL_RV_SENSING_SENSITIVITY: 0.9 MV
MDC_IDC_SET_ZONE_DETECTION_INTERVAL: 350 MS
MDC_IDC_SET_ZONE_DETECTION_INTERVAL: 400 MS
MDC_IDC_SET_ZONE_TYPE: NORMAL
MDC_IDC_STAT_AT_BURDEN_PERCENT: 0.1 %
MDC_IDC_STAT_AT_DTM_END: NORMAL
MDC_IDC_STAT_AT_DTM_START: NORMAL
MDC_IDC_STAT_BRADY_AP_VP_PERCENT: 9.01 %
MDC_IDC_STAT_BRADY_AP_VS_PERCENT: 0.45 %
MDC_IDC_STAT_BRADY_AS_VP_PERCENT: 66.36 %
MDC_IDC_STAT_BRADY_AS_VS_PERCENT: 24.18 %
MDC_IDC_STAT_BRADY_DTM_END: NORMAL
MDC_IDC_STAT_BRADY_DTM_START: NORMAL
MDC_IDC_STAT_BRADY_RA_PERCENT_PACED: 9.47 %
MDC_IDC_STAT_BRADY_RV_PERCENT_PACED: 75.37 %
MDC_IDC_STAT_EPISODE_RECENT_COUNT: 0
MDC_IDC_STAT_EPISODE_RECENT_COUNT: 22
MDC_IDC_STAT_EPISODE_RECENT_COUNT_DTM_END: NORMAL
MDC_IDC_STAT_EPISODE_RECENT_COUNT_DTM_START: NORMAL
MDC_IDC_STAT_EPISODE_TOTAL_COUNT: 0
MDC_IDC_STAT_EPISODE_TOTAL_COUNT: 259
MDC_IDC_STAT_EPISODE_TOTAL_COUNT_DTM_END: NORMAL
MDC_IDC_STAT_EPISODE_TOTAL_COUNT_DTM_START: NORMAL
MDC_IDC_STAT_EPISODE_TYPE: NORMAL

## 2022-01-27 ENCOUNTER — OFFICE VISIT (OUTPATIENT)
Dept: FAMILY MEDICINE | Facility: CLINIC | Age: 82
End: 2022-01-27
Payer: MEDICARE

## 2022-01-27 VITALS
WEIGHT: 132 LBS | DIASTOLIC BLOOD PRESSURE: 78 MMHG | TEMPERATURE: 97.2 F | SYSTOLIC BLOOD PRESSURE: 138 MMHG | HEIGHT: 59 IN | HEART RATE: 100 BPM | BODY MASS INDEX: 26.61 KG/M2 | OXYGEN SATURATION: 100 %

## 2022-01-27 DIAGNOSIS — I10 ESSENTIAL HYPERTENSION: ICD-10-CM

## 2022-01-27 DIAGNOSIS — Z13.220 SCREENING FOR LIPID DISORDERS: ICD-10-CM

## 2022-01-27 DIAGNOSIS — E11.59 TYPE 2 DIABETES MELLITUS WITH OTHER CIRCULATORY COMPLICATION, UNSPECIFIED WHETHER LONG TERM INSULIN USE (H): Primary | ICD-10-CM

## 2022-01-27 LAB
ANION GAP SERPL CALCULATED.3IONS-SCNC: 8 MMOL/L (ref 3–14)
BUN SERPL-MCNC: 35 MG/DL (ref 7–30)
CALCIUM SERPL-MCNC: 9.5 MG/DL (ref 8.5–10.1)
CHLORIDE BLD-SCNC: 105 MMOL/L (ref 94–109)
CHOLEST SERPL-MCNC: 174 MG/DL
CO2 SERPL-SCNC: 27 MMOL/L (ref 20–32)
CREAT SERPL-MCNC: 1.89 MG/DL (ref 0.52–1.04)
FASTING STATUS PATIENT QL REPORTED: NO
GFR SERPL CREATININE-BSD FRML MDRD: 26 ML/MIN/1.73M2
GLUCOSE BLD-MCNC: 169 MG/DL (ref 70–99)
HBA1C MFR BLD: 6.6 % (ref 0–5.6)
HDLC SERPL-MCNC: 56 MG/DL
LDLC SERPL CALC-MCNC: 74 MG/DL
NONHDLC SERPL-MCNC: 118 MG/DL
POTASSIUM BLD-SCNC: 4.5 MMOL/L (ref 3.4–5.3)
SODIUM SERPL-SCNC: 140 MMOL/L (ref 133–144)
TRIGL SERPL-MCNC: 222 MG/DL

## 2022-01-27 PROCEDURE — 80048 BASIC METABOLIC PNL TOTAL CA: CPT | Performed by: FAMILY MEDICINE

## 2022-01-27 PROCEDURE — 80061 LIPID PANEL: CPT | Performed by: FAMILY MEDICINE

## 2022-01-27 RX ORDER — SIMVASTATIN 40 MG
40 TABLET ORAL AT BEDTIME
Qty: 90 TABLET | Refills: 3 | Status: SHIPPED | OUTPATIENT
Start: 2022-01-27 | End: 2022-07-28

## 2022-01-27 RX ORDER — LISINOPRIL 20 MG/1
20 TABLET ORAL DAILY
Qty: 90 TABLET | Refills: 2 | Status: SHIPPED | OUTPATIENT
Start: 2022-01-27 | End: 2022-10-28

## 2022-01-27 RX ORDER — AMLODIPINE BESYLATE 10 MG/1
10 TABLET ORAL DAILY
Qty: 90 TABLET | Refills: 2 | Status: SHIPPED | OUTPATIENT
Start: 2022-01-27 | End: 2022-10-28

## 2022-01-27 ASSESSMENT — MIFFLIN-ST. JEOR: SCORE: 978.34

## 2022-01-27 NOTE — PROGRESS NOTES
SUBJECTIVE:   Ashley Lynn is a 81 year old female who presents to clinic today to discuss the following problem(s).    # Type 2 Diabetes Mellitus  Lab Results   Component Value Date    A1C 6.4 07/26/2021    A1C 6.8 10/07/2020     Lab Results   Component Value Date    CR 1.1 10/07/2020     Albumin Urine mg/g Cr (mg/g Cr)   Date Value   10/07/2020 136.05 (H)     Creatinine Urine (mg/dL)   Date Value   10/07/2020 172     - Current Regimen: diet and exercise  - Missed doses: n/a  - Self monitoring blood gluose?: yes  - Hypoglycemic episodes?: no    Wt Readings from Last 5 Encounters:   09/27/21 57 kg (125 lb 11.2 oz)   07/26/21 56.7 kg (125 lb)   11/04/20 53.1 kg (117 lb)   10/29/20 53.4 kg (117 lb 12 oz)   - Exercise: minimal  - Diet: no concerns  - Last eye exam: within the past year  - Last foot exam: today  - Last dental exam: regularly    Recent Labs   Lab Test 10/07/20  1128   CHOL 203.0*   HDL 53.0   .0   TRIG 198.0*   CHOLHDLRATIO 3.8   - History of ASCVD?: no  - On ASA and statin?: yes     ROS: 10 point ROS neg other than the symptoms noted above in the HPI.      Today's PHQ-2:  PHQ-2 ( 1999 Pfizer) 1/27/2022 7/26/2021   Q1: Little interest or pleasure in doing things 0 0   Q2: Feeling down, depressed or hopeless 0 1   PHQ-2 Score 0 1   PHQ-2 Total Score (12-17 Years)- Positive if 3 or more points; Administer PHQ-A if positive - 1       Past Medical History:   Diagnosis Date     Cardiac pacemaker in situ 10/07/2020     Type 2 diabetes mellitus without complication, without long-term current use of insulin (H) 10/07/2020     Past Surgical History:   Procedure Laterality Date     IMPLANT PACEMAKER       No family history on file.  Social History     Tobacco Use     Smoking status: Never Smoker     Smokeless tobacco: Never Used   Substance Use Topics     Alcohol use: Never     Drug use: Not on file     Social History     Social History Narrative     Not on file       Current Outpatient Medications  "  Medication     amLODIPine (NORVASC) 10 MG tablet     aspirin (ASA) 81 MG chewable tablet     Continuous Blood Gluc  (FREESTYLE ANURAG 14 DAY READER) STEVIE     Continuous Blood Gluc Sensor (FREESTYLE ANURAG 14 DAY SENSOR) MISC     famotidine (PEPCID) 20 MG tablet     lisinopril (ZESTRIL) 20 MG tablet     Multiple Vitamins-Minerals (MULTIVITAMIN ADULT PO)     simvastatin (ZOCOR) 40 MG tablet     No current facility-administered medications for this visit.     I have reviewed the patient's past medical, surgical, family, and social history.     OBJECTIVE:   /78   Pulse 100   Temp 97.2  F (36.2  C) (Esophageal)   Ht 1.505 m (4' 11.25\")   Wt 59.9 kg (132 lb)   SpO2 100%   BMI 26.44 kg/m       Initial BP elevated at 169/70. Repeat pressures 138/78    Constitutional: well-appearing, appears stated age  Eyes: conjunctivae without erythema, sclera anicteric.   Cardiac: regular rate and rhythm, normal S1/S2, no murmur/rubs/gallops  Respiratory: lungs clear to auscultation bilaterally, normal work of breathing, no wheezes/crackles  Feet: dry skin but no cracks or open sores, fine point sensation intact  Skin: no rashes, lesions, or wounds  Psych: affect is full and appropriate, speech is fluent and non-pressured    ASSESSMENT AND PLAN:     Ashley was seen today for diabetes.    Diagnoses and all orders for this visit:    Type 2 diabetes mellitus with other circulatory complication, unspecified whether long term insulin use (H)  -     Hemoglobin A1c; Future  -     Albumin Random Urine Quantitative with Creat Ratio; Future  -     [place] FOOT EXAM NO CHARGE  -     simvastatin (ZOCOR) 40 MG tablet; Take 1 tablet (40 mg) by mouth At Bedtime  -     Hemoglobin A1c  -     Albumin Random Urine Quantitative with Creat Ratio    Screening for lipid disorders  -     Basic metabolic panel; Future  -     Lipid Profile; Future  -     Basic metabolic panel  -     Lipid Profile    Essential hypertension  -     lisinopril (ZESTRIL) " 20 MG tablet; Take 1 tablet (20 mg) by mouth daily  -     amLODIPine (NORVASC) 10 MG tablet; Take 1 tablet (10 mg) by mouth daily    Labs as ordered today. Will follow up as indicated. Repeat pressures wnls and patient's son reports home pressure checks are largely wnls.       Nicholas Velazquez MD  [facility]  01/27/2022, 11:55 AM

## 2022-01-27 NOTE — NURSING NOTE
"81 year old  Chief Complaint   Patient presents with     Diabetes     F/u Diabetes management        Blood pressure (!) 169/70, pulse 100, temperature 97.2  F (36.2  C), temperature source Esophageal, height 1.505 m (4' 11.25\"), weight 59.9 kg (132 lb), SpO2 100 %. Body mass index is 26.44 kg/m .  Patient Active Problem List   Diagnosis     Type 2 diabetes mellitus with other circulatory complication, unspecified whether long term insulin use (H)     Gait instability     Bradycardia     Cardiac pacemaker in situ     Essential hypertension       Wt Readings from Last 2 Encounters:   01/27/22 59.9 kg (132 lb)   09/27/21 57 kg (125 lb 11.2 oz)     BP Readings from Last 3 Encounters:   01/27/22 (!) 169/70   09/27/21 (!) 147/78   07/26/21 (!) 153/79         Current Outpatient Medications   Medication     amLODIPine (NORVASC) 10 MG tablet     aspirin (ASA) 81 MG chewable tablet     Continuous Blood Gluc  (FREESTYLE ANURAG 14 DAY READER) STEVIE     Continuous Blood Gluc Sensor ([place] Network[place]YLE ANURAG 14 DAY SENSOR) MISC     famotidine (PEPCID) 20 MG tablet     lisinopril (ZESTRIL) 20 MG tablet     Multiple Vitamins-Minerals (MULTIVITAMIN ADULT PO)     simvastatin (ZOCOR) 40 MG tablet     No current facility-administered medications for this visit.       Social History     Tobacco Use     Smoking status: Never Smoker     Smokeless tobacco: Never Used   Substance Use Topics     Alcohol use: Never     Drug use: Not on file       Health Maintenance Due   Topic Date Due     ADVANCE CARE PLANNING  Never done     DTAP/TDAP/TD IMMUNIZATION (1 - Tdap) Never done     ZOSTER IMMUNIZATION (1 of 2) Never done     MEDICARE ANNUAL WELLNESS VISIT  Never done     BMP  10/07/2021     LIPID  10/07/2021     MICROALBUMIN  10/07/2021     FALL RISK ASSESSMENT  10/07/2021     A1C  10/26/2021     DIABETIC FOOT EXAM  10/29/2021     PHQ-2  01/01/2022       No results found for: PAP      January 27, 2022 2:10 PM  "

## 2022-01-28 DIAGNOSIS — N17.9 AKI (ACUTE KIDNEY INJURY) (H): Primary | ICD-10-CM

## 2022-01-30 ENCOUNTER — HEALTH MAINTENANCE LETTER (OUTPATIENT)
Age: 82
End: 2022-01-30

## 2022-02-04 ENCOUNTER — LAB (OUTPATIENT)
Dept: LAB | Facility: CLINIC | Age: 82
End: 2022-02-04
Payer: MEDICARE

## 2022-02-04 DIAGNOSIS — E11.59 TYPE 2 DIABETES MELLITUS WITH OTHER CIRCULATORY COMPLICATION, UNSPECIFIED WHETHER LONG TERM INSULIN USE (H): ICD-10-CM

## 2022-02-04 LAB
CREAT UR-MCNC: 154 MG/DL
MICROALBUMIN UR-MCNC: 8 MG/L
MICROALBUMIN/CREAT UR: 5.19 MG/G CR (ref 0–25)

## 2022-02-04 PROCEDURE — 82043 UR ALBUMIN QUANTITATIVE: CPT | Performed by: FAMILY MEDICINE

## 2022-03-09 ENCOUNTER — LAB (OUTPATIENT)
Dept: LAB | Facility: CLINIC | Age: 82
End: 2022-03-09
Payer: MEDICARE

## 2022-03-09 DIAGNOSIS — N17.9 AKI (ACUTE KIDNEY INJURY) (H): ICD-10-CM

## 2022-03-09 LAB
ANION GAP SERPL CALCULATED.3IONS-SCNC: 6 MMOL/L (ref 3–14)
BUN SERPL-MCNC: 24 MG/DL (ref 7–30)
CALCIUM SERPL-MCNC: 9.2 MG/DL (ref 8.5–10.1)
CHLORIDE BLD-SCNC: 105 MMOL/L (ref 94–109)
CO2 SERPL-SCNC: 27 MMOL/L (ref 20–32)
CREAT SERPL-MCNC: 1.55 MG/DL (ref 0.52–1.04)
GFR SERPL CREATININE-BSD FRML MDRD: 33 ML/MIN/1.73M2
GLUCOSE BLD-MCNC: 153 MG/DL (ref 70–99)
POTASSIUM BLD-SCNC: 4.5 MMOL/L (ref 3.4–5.3)
SODIUM SERPL-SCNC: 138 MMOL/L (ref 133–144)

## 2022-03-09 PROCEDURE — 82310 ASSAY OF CALCIUM: CPT | Performed by: FAMILY MEDICINE

## 2022-03-28 ENCOUNTER — ANCILLARY PROCEDURE (OUTPATIENT)
Dept: CARDIOLOGY | Facility: CLINIC | Age: 82
End: 2022-03-28
Attending: INTERNAL MEDICINE
Payer: MEDICARE

## 2022-03-28 VITALS
BODY MASS INDEX: 26.31 KG/M2 | DIASTOLIC BLOOD PRESSURE: 76 MMHG | WEIGHT: 134 LBS | OXYGEN SATURATION: 99 % | HEIGHT: 60 IN | SYSTOLIC BLOOD PRESSURE: 149 MMHG | HEART RATE: 95 BPM

## 2022-03-28 DIAGNOSIS — I44.2 HEART BLOCK AV COMPLETE (H): ICD-10-CM

## 2022-03-28 DIAGNOSIS — I10 ESSENTIAL HYPERTENSION: ICD-10-CM

## 2022-03-28 DIAGNOSIS — R00.1 BRADYCARDIA: ICD-10-CM

## 2022-03-28 DIAGNOSIS — I48.0 PAROXYSMAL ATRIAL FIBRILLATION (H): Primary | ICD-10-CM

## 2022-03-28 DIAGNOSIS — Z95.0 CARDIAC PACEMAKER IN SITU: ICD-10-CM

## 2022-03-28 DIAGNOSIS — Z95.0 CARDIAC PACEMAKER IN SITU: Primary | ICD-10-CM

## 2022-03-28 LAB
MDC_IDC_EPISODE_DTM: NORMAL
MDC_IDC_EPISODE_DURATION: 12 S
MDC_IDC_EPISODE_DURATION: 182 S
MDC_IDC_EPISODE_DURATION: 182 S
MDC_IDC_EPISODE_DURATION: 195 S
MDC_IDC_EPISODE_DURATION: 82 S
MDC_IDC_EPISODE_ID: 260
MDC_IDC_EPISODE_ID: 261
MDC_IDC_EPISODE_ID: 262
MDC_IDC_EPISODE_ID: 263
MDC_IDC_EPISODE_ID: 264
MDC_IDC_EPISODE_TYPE: NORMAL
MDC_IDC_LEAD_IMPLANT_DT: NORMAL
MDC_IDC_LEAD_IMPLANT_DT: NORMAL
MDC_IDC_LEAD_LOCATION: NORMAL
MDC_IDC_LEAD_LOCATION: NORMAL
MDC_IDC_LEAD_LOCATION_DETAIL_1: NORMAL
MDC_IDC_LEAD_LOCATION_DETAIL_1: NORMAL
MDC_IDC_LEAD_MFG: NORMAL
MDC_IDC_LEAD_MFG: NORMAL
MDC_IDC_LEAD_MODEL: NORMAL
MDC_IDC_LEAD_MODEL: NORMAL
MDC_IDC_LEAD_POLARITY_TYPE: NORMAL
MDC_IDC_LEAD_POLARITY_TYPE: NORMAL
MDC_IDC_LEAD_SERIAL: NORMAL
MDC_IDC_LEAD_SERIAL: NORMAL
MDC_IDC_LEAD_SPECIAL_FUNCTION: NORMAL
MDC_IDC_LEAD_SPECIAL_FUNCTION: NORMAL
MDC_IDC_MSMT_BATTERY_DTM: NORMAL
MDC_IDC_MSMT_BATTERY_REMAINING_LONGEVITY: 145 MO
MDC_IDC_MSMT_BATTERY_RRT_TRIGGER: 2.62
MDC_IDC_MSMT_BATTERY_STATUS: NORMAL
MDC_IDC_MSMT_BATTERY_VOLTAGE: 3.02 V
MDC_IDC_MSMT_LEADCHNL_RA_IMPEDANCE_VALUE: 494 OHM
MDC_IDC_MSMT_LEADCHNL_RA_IMPEDANCE_VALUE: 608 OHM
MDC_IDC_MSMT_LEADCHNL_RA_PACING_THRESHOLD_AMPLITUDE: 0.5 V
MDC_IDC_MSMT_LEADCHNL_RA_PACING_THRESHOLD_PULSEWIDTH: 0.4 MS
MDC_IDC_MSMT_LEADCHNL_RA_SENSING_INTR_AMPL: 3.9 MV
MDC_IDC_MSMT_LEADCHNL_RV_IMPEDANCE_VALUE: 342 OHM
MDC_IDC_MSMT_LEADCHNL_RV_IMPEDANCE_VALUE: 456 OHM
MDC_IDC_MSMT_LEADCHNL_RV_PACING_THRESHOLD_AMPLITUDE: 0.75 V
MDC_IDC_MSMT_LEADCHNL_RV_PACING_THRESHOLD_PULSEWIDTH: 0.4 MS
MDC_IDC_PG_IMPLANT_DTM: NORMAL
MDC_IDC_PG_MFG: NORMAL
MDC_IDC_PG_MODEL: NORMAL
MDC_IDC_PG_SERIAL: NORMAL
MDC_IDC_PG_TYPE: NORMAL
MDC_IDC_SESS_CLINIC_NAME: NORMAL
MDC_IDC_SESS_DTM: NORMAL
MDC_IDC_SESS_TYPE: NORMAL
MDC_IDC_SET_BRADY_AT_MODE_SWITCH_RATE: 171 {BEATS}/MIN
MDC_IDC_SET_BRADY_HYSTRATE: NORMAL
MDC_IDC_SET_BRADY_LOWRATE: 60 {BEATS}/MIN
MDC_IDC_SET_BRADY_MAX_SENSOR_RATE: 120 {BEATS}/MIN
MDC_IDC_SET_BRADY_MAX_TRACKING_RATE: 120 {BEATS}/MIN
MDC_IDC_SET_BRADY_MODE: NORMAL
MDC_IDC_SET_BRADY_PAV_DELAY_LOW: 220 MS
MDC_IDC_SET_BRADY_SAV_DELAY_LOW: 200 MS
MDC_IDC_SET_LEADCHNL_RA_PACING_AMPLITUDE: 1.5 V
MDC_IDC_SET_LEADCHNL_RA_PACING_ANODE_ELECTRODE_1: NORMAL
MDC_IDC_SET_LEADCHNL_RA_PACING_ANODE_LOCATION_1: NORMAL
MDC_IDC_SET_LEADCHNL_RA_PACING_CAPTURE_MODE: NORMAL
MDC_IDC_SET_LEADCHNL_RA_PACING_CATHODE_ELECTRODE_1: NORMAL
MDC_IDC_SET_LEADCHNL_RA_PACING_CATHODE_LOCATION_1: NORMAL
MDC_IDC_SET_LEADCHNL_RA_PACING_POLARITY: NORMAL
MDC_IDC_SET_LEADCHNL_RA_PACING_PULSEWIDTH: 0.4 MS
MDC_IDC_SET_LEADCHNL_RA_SENSING_ANODE_ELECTRODE_1: NORMAL
MDC_IDC_SET_LEADCHNL_RA_SENSING_ANODE_LOCATION_1: NORMAL
MDC_IDC_SET_LEADCHNL_RA_SENSING_CATHODE_ELECTRODE_1: NORMAL
MDC_IDC_SET_LEADCHNL_RA_SENSING_CATHODE_LOCATION_1: NORMAL
MDC_IDC_SET_LEADCHNL_RA_SENSING_POLARITY: NORMAL
MDC_IDC_SET_LEADCHNL_RA_SENSING_SENSITIVITY: 0.3 MV
MDC_IDC_SET_LEADCHNL_RV_PACING_AMPLITUDE: 2 V
MDC_IDC_SET_LEADCHNL_RV_PACING_ANODE_ELECTRODE_1: NORMAL
MDC_IDC_SET_LEADCHNL_RV_PACING_ANODE_LOCATION_1: NORMAL
MDC_IDC_SET_LEADCHNL_RV_PACING_CAPTURE_MODE: NORMAL
MDC_IDC_SET_LEADCHNL_RV_PACING_CATHODE_ELECTRODE_1: NORMAL
MDC_IDC_SET_LEADCHNL_RV_PACING_CATHODE_LOCATION_1: NORMAL
MDC_IDC_SET_LEADCHNL_RV_PACING_POLARITY: NORMAL
MDC_IDC_SET_LEADCHNL_RV_PACING_PULSEWIDTH: 0.4 MS
MDC_IDC_SET_LEADCHNL_RV_SENSING_ANODE_ELECTRODE_1: NORMAL
MDC_IDC_SET_LEADCHNL_RV_SENSING_ANODE_LOCATION_1: NORMAL
MDC_IDC_SET_LEADCHNL_RV_SENSING_CATHODE_ELECTRODE_1: NORMAL
MDC_IDC_SET_LEADCHNL_RV_SENSING_CATHODE_LOCATION_1: NORMAL
MDC_IDC_SET_LEADCHNL_RV_SENSING_POLARITY: NORMAL
MDC_IDC_SET_LEADCHNL_RV_SENSING_SENSITIVITY: 0.9 MV
MDC_IDC_SET_ZONE_DETECTION_INTERVAL: 350 MS
MDC_IDC_SET_ZONE_DETECTION_INTERVAL: 400 MS
MDC_IDC_SET_ZONE_TYPE: NORMAL
MDC_IDC_STAT_AT_BURDEN_PERCENT: 0.1 %
MDC_IDC_STAT_AT_DTM_END: NORMAL
MDC_IDC_STAT_AT_DTM_START: NORMAL
MDC_IDC_STAT_BRADY_AP_VP_PERCENT: 4.33 %
MDC_IDC_STAT_BRADY_AP_VS_PERCENT: 1.54 %
MDC_IDC_STAT_BRADY_AS_VP_PERCENT: 45.55 %
MDC_IDC_STAT_BRADY_AS_VS_PERCENT: 48.58 %
MDC_IDC_STAT_BRADY_DTM_END: NORMAL
MDC_IDC_STAT_BRADY_DTM_START: NORMAL
MDC_IDC_STAT_BRADY_RA_PERCENT_PACED: 5.91 %
MDC_IDC_STAT_BRADY_RV_PERCENT_PACED: 49.91 %
MDC_IDC_STAT_EPISODE_RECENT_COUNT: 0
MDC_IDC_STAT_EPISODE_RECENT_COUNT: 0
MDC_IDC_STAT_EPISODE_RECENT_COUNT: 264
MDC_IDC_STAT_EPISODE_RECENT_COUNT_DTM_END: NORMAL
MDC_IDC_STAT_EPISODE_RECENT_COUNT_DTM_START: NORMAL
MDC_IDC_STAT_EPISODE_TOTAL_COUNT: 0
MDC_IDC_STAT_EPISODE_TOTAL_COUNT: 0
MDC_IDC_STAT_EPISODE_TOTAL_COUNT: 264
MDC_IDC_STAT_EPISODE_TOTAL_COUNT_DTM_END: NORMAL
MDC_IDC_STAT_EPISODE_TOTAL_COUNT_DTM_START: NORMAL
MDC_IDC_STAT_EPISODE_TYPE: NORMAL

## 2022-03-28 PROCEDURE — 99214 OFFICE O/P EST MOD 30 MIN: CPT | Mod: 25 | Performed by: INTERNAL MEDICINE

## 2022-03-28 PROCEDURE — G0463 HOSPITAL OUTPT CLINIC VISIT: HCPCS

## 2022-03-28 PROCEDURE — 93280 PM DEVICE PROGR EVAL DUAL: CPT | Performed by: INTERNAL MEDICINE

## 2022-03-28 ASSESSMENT — PAIN SCALES - GENERAL: PAINLEVEL: NO PAIN (0)

## 2022-03-28 NOTE — LETTER
3/28/2022      RE: Ashley Lynn  [address]  [facility] [address]       Dear Colleague,    Thank you for the opportunity to participate in the care of your patient, Ashley Lynn, at the [facility] [place] [facility]. Please see a copy of my visit note below.    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   She was referred to establish a cardiology care.  She has been doing well.  She denied any chest pain, SOB, palpitation or dizsiness.    PAST MEDICAL HISTORY:  Past Medical History:   Diagnosis Date     Cardiac pacemaker in situ 10/07/2020     Type 2 diabetes mellitus without complication, without long-term current use of insulin (H) 10/07/2020       CURRENT MEDICATIONS:  Current Outpatient Medications   Medication Sig Dispense Refill     amLODIPine (NORVASC) 10 MG tablet Take 1 tablet (10 mg) by mouth daily 90 tablet 2     aspirin (ASA) 81 MG chewable tablet Take 1 tablet (81 mg) by mouth daily 90 tablet 3     Continuous Blood Gluc  (FREESTYLE ANURAG 14 DAY READER) STEVIE Use to read blood sugars as per 's instructions. 1 each 0     Continuous Blood Gluc Sensor (FREESTYLE ANURAG 14 DAY SENSOR) MISC Change every 14 days. 2 each 6     lisinopril (ZESTRIL) 20 MG tablet Take 1 tablet (20 mg) by mouth daily 90 tablet 2     Multiple Vitamins-Minerals (MULTIVITAMIN ADULT PO)        simvastatin (ZOCOR) 40 MG tablet Take 1 tablet (40 mg) by mouth At Bedtime 90 tablet 3     famotidine (PEPCID) 20 MG tablet Take 20 mg by mouth 2 times daily (Patient not taking: Reported on 3/28/2022)         PAST SURGICAL HISTORY:  Past Surgical History:   Procedure Laterality Date     IMPLANT PACEMAKER         ALLERGIES:   No Known Allergies    FAMILY HISTORY:  - Premature coronary artery disease  - Atrial fibrillation  - Sudden cardiac  death     SOCIAL HISTORY:  Social History     Tobacco Use     Smoking status: Never Smoker     Smokeless tobacco: Never Used   Substance Use Topics     Alcohol use: Never     Drug use: None       ROS:   Constitutional: No fever, chills, or sweats. Weight stable.   ENT: No visual disturbance, ear ache, epistaxis, sore throat.   Cardiovascular: As per HPI.   Respiratory: No cough, hemoptysis.    GI: No nausea, vomiting, hematemesis, melena, or hematochezia.   : No hematuria.   Integument: Negative.   Psychiatric: Negative.   Hematologic:  Easy bruising, no easy bleeding.  Neuro: Negative.   Endocrinology: No significant heat or cold intolerance   Musculoskeletal: No myalgia.    Exam:  BP (!) 149/76 (BP Location: Right arm, Patient Position: Chair, Cuff Size: Adult Regular)   Pulse 95   Ht 1.524 m (5')   Wt 60.8 kg (134 lb)   SpO2 99%   BMI 26.17 kg/m    GENERAL APPEARANCE: healthy, alert and no distress  HEENT: no icterus, no xanthelasmas, normal pupil size and reaction, normal palate, mucosa moist, no central cyanosis  NECK: no adenopathy, no asymmetry, masses, or scars, thyroid normal to palpation and no bruits, JVP not elevated  RESPIRATORY: lungs clear to auscultation - no rales, rhonchi or wheezes, no use of accessory muscles, no retractions, respirations are unlabored, normal respiratory rate  CARDIOVASCULAR: regular rhythm, normal S1 with physiologic split S2, no S3 or S4 and no murmur, click or rub, precordium quiet with normal PMI.  ABDOMEN: soft, non tender, without hepatosplenomegaly, no masses palpable, bowel sounds normal, aorta not enlarged by palpation, no abdominal bruits  EXTREMITIES: peripheral pulses normal, no edema, no bruits  NEURO: alert and oriented to person/place/time, normal speech, gait and affect  VASC: Radial, femoral, dorsalis pedis and posterior tibialis pulses are normal in volumes and symmetric bilaterally. No bruits are heard.  SKIN: no ecchymoses, no rashes    Labs:  CBC  RESULTS:   No results found for: WBC, RBC, HGB, HCT, MCV, MCH, MCHC, RDW, PLT    BMP RESULTS:  Lab Results   Component Value Date     03/09/2022    .0 10/07/2020    POTASSIUM 4.5 03/09/2022    POTASSIUM 4.3 10/07/2020    CHLORIDE 105 03/09/2022    CHLORIDE 102.0 10/07/2020    CO2 27 03/09/2022    CO2 28.0 10/07/2020    ANIONGAP 6 03/09/2022     (H) 03/09/2022    GLC 77.0 10/07/2020    BUN 24 03/09/2022    BUN 14.0 10/07/2020    CR 1.55 (H) 03/09/2022    CR 1.1 10/07/2020    GFRESTIMATED 33 (L) 03/09/2022    YOGI 9.2 03/09/2022    YOGI 9.1 10/07/2020        INR RESULTS:  No results found for: INR    Procedures:  Pacemaker interrogation on 9/2/2021: Reviewed.  Remote pacemaker transmission received and reviewed. Device transmission sent per MD orders.     Device: Medtronic [id number] Ludy XT DR CONTRERAS  Normal Device Function.  Mode: DDD  bpm  AP: 3.1%  : 5.8%  Presenting EGM: AS/VS @ 90 bpm  Short V-V intervals: 0  Lead Trends Appear Stable  Estimated battery longevity to RRT = 13.1 years.   Atrial Arrhythmia: 18 AT episodes lasting up to 3.5 minutes.  AF [place]: <0.1%  Anticoagulant: None  Ventricular Arrhythmia: None  Pt Notified of Transmission Results: My Chart. Dr. العراقي had previously asked pt to f/u with EP. She has been reminded to make that appointment.     Plan: Send a remote transmission in 3 months.  NORMAN Vo RN     Remote pacemaker transmission     I have reviewed and interpreted the device interrogation, settings, programming and nurse's summary. The device is functioning within normal device parameters. I agree with the current findings, assessment and plan.    Pacemaker interrogation on 3/28/2022: Reviewed.  Patient [place] in [facility] for evaluation and iterative programming of their pacemaker per MD orders.     Device: Medtronic Ludy XT   Normal device function  Mode: DDD  bpm  AP: 6%  : 50%  Intrinsic Rhythm: CHB: SR @ 97 bpm/  @ 30 bpm  Short V-V intervals: 0  Lead  Trends Appear Stable: yes  Estimated battery longevity to RRT = 12 years. Battery voltage = 3.02V.   Atrial Arrhythmia: 5 AT/AF.  AF [place] = <1%  Anticoagulant: none  Ventricular Arrhythmia: 0  Setting Changes: none  Patient has an appointment to see Dr. Hilton today.      Plan: Pt will send a remote transmission in 3 months and return to clinic in 12 months.   Nicholas PEDRO, RN     dual lead pacemaker     I have reviewed and interpreted the device interrogation, settings, programming and nurse's summary. The device is functioning within normal device parameters. I agree with the current findings, assessment and plan.    Assessment and Plan:  # DM II  # HTN Well controlled on Norvasc 10 mg daily.  # Advanced AV block s/p MDT dual chamber pacemaker placement on 9/5/2020.  # PAF  AF burden was <1% per pacemaker interrogation on 3/28/2022.  We will keep monitoring.  She has been doing well and her pacemaker has been functioning appropriately.  I advised her to continue the same medication.  I will see her back in a year.      I spent a total of 30 min today to review the records, see the patient, and complete the documents.      Please do not hesitate to contact me if you have any questions/concerns.     Sincerely,     Eileen Hilton MD      CC  Patient Care Team:  Nicholas Velazquez MD as PCP - General (Family Practice)  Marisela Campoverde RD as Diabetes Educator (Dietitian, Registered)  [place]

## 2022-03-28 NOTE — NURSING NOTE
Chief Complaint   Patient presents with     Follow Up     6 mo f/u for AV block     Vitals were taken and medications were reconciled.   David Heredia, EMT  2:20 PM

## 2022-03-28 NOTE — PATIENT INSTRUCTIONS
You were seen in the Electrophysiology Clinic today by: Dr Hilton    Plan:     Follow up visit:    1 year with Dr Hilton and device check prior        Your Care Team:  EP Cardiology   Telephone Number     Nurse Line  Flory Puckett RN  [phone]     For scheduling appts or procedures:    Sonia Gar   [phone]   For the Device Clinic (Pacemakers, ICDs, Loop Recorders)    During business hours: [phone]  After business hours:   [phone]- select option 4 and ask for job code [id number].     On-call cardiologist for after hours or on weekends: [phone], option #4, and ask to speak to the on-call cardiologist.     Cardiovascular Clinic:   [address] [facility] [place] [address]      As always, Thank you for trusting us with your health care needs!

## 2022-03-28 NOTE — PATIENT INSTRUCTIONS
It was a pleasure to see you in clinic today.  Please do not hesitate to call with any questions or concerns.  You are scheduled for a remote transmission on 6/29/22.  We look forward to seeing you in clinic at your next device check in 12 months.    Nicholas Willingham, RN  Electrophysiology Nurse Clinician  [facility]    During Business Hours Please Call:  [phone]  After Hours Please Call:  [phone] - select option #4 and ask for job code [id number]

## 2022-05-17 ENCOUNTER — MYC MEDICAL ADVICE (OUTPATIENT)
Dept: FAMILY MEDICINE | Facility: CLINIC | Age: 82
End: 2022-05-17
Payer: MEDICARE

## 2022-05-17 DIAGNOSIS — E11.59 TYPE 2 DIABETES MELLITUS WITH OTHER CIRCULATORY COMPLICATION, UNSPECIFIED WHETHER LONG TERM INSULIN USE (H): ICD-10-CM

## 2022-05-17 RX ORDER — FLASH GLUCOSE SENSOR
KIT MISCELLANEOUS
Qty: 2 EACH | Refills: 6 | Status: SHIPPED | OUTPATIENT
Start: 2022-05-17 | End: 2022-10-28

## 2022-05-17 NOTE — TELEPHONE ENCOUNTER
Rx refilled as requested.    Ashley was seen today for refill request.    Diagnoses and all orders for this visit:    Type 2 diabetes mellitus with other circulatory complication, unspecified whether long term insulin use (H)  -     Continuous Blood Gluc Sensor (FREESTYLE ANURAG 14 DAY SENSOR) MISC; Change every 14 days.      Nicholas Velazquez MD  5:41 PM, May 17, 2022

## 2022-05-17 NOTE — TELEPHONE ENCOUNTER
Medication requested: Continuous Blood Gluc Sensor (FREESTYLE ANURAG 14 DAY SENSOR) [facility]  Last office visit: 1/27/22  [facility] appointments: 7/28/22  Medication last refilled: 11/3/21; 2 + 6 refills  Last qualifying labs:   Component      Latest Ref Rng & Units 1/27/2022   Hemoglobin A1C      0.0 - 5.6 % 6.6 (H)     Routing refill request to provider for review/approval because:  Drug not on the [facility] refill protocol     Jeffrey ROPER, RN  05/17/22 5:04 PM

## 2022-05-22 ENCOUNTER — HEALTH MAINTENANCE LETTER (OUTPATIENT)
Age: 82
End: 2022-05-22

## 2022-06-29 ENCOUNTER — ANCILLARY PROCEDURE (OUTPATIENT)
Dept: CARDIOLOGY | Facility: CLINIC | Age: 82
End: 2022-06-29
Attending: INTERNAL MEDICINE
Payer: MEDICARE

## 2022-06-29 DIAGNOSIS — R00.1 BRADYCARDIA: ICD-10-CM

## 2022-06-29 PROCEDURE — 93294 REM INTERROG EVL PM/LDLS PM: CPT | Performed by: INTERNAL MEDICINE

## 2022-06-29 PROCEDURE — 93296 REM INTERROG EVL PM/IDS: CPT

## 2022-07-03 LAB
MDC_IDC_EPISODE_DTM: NORMAL
MDC_IDC_EPISODE_DURATION: 2 S
MDC_IDC_EPISODE_ID: 265
MDC_IDC_EPISODE_TYPE: NORMAL
MDC_IDC_LEAD_IMPLANT_DT: NORMAL
MDC_IDC_LEAD_IMPLANT_DT: NORMAL
MDC_IDC_LEAD_LOCATION: NORMAL
MDC_IDC_LEAD_LOCATION: NORMAL
MDC_IDC_LEAD_LOCATION_DETAIL_1: NORMAL
MDC_IDC_LEAD_LOCATION_DETAIL_1: NORMAL
MDC_IDC_LEAD_MFG: NORMAL
MDC_IDC_LEAD_MFG: NORMAL
MDC_IDC_LEAD_MODEL: NORMAL
MDC_IDC_LEAD_MODEL: NORMAL
MDC_IDC_LEAD_POLARITY_TYPE: NORMAL
MDC_IDC_LEAD_POLARITY_TYPE: NORMAL
MDC_IDC_LEAD_SERIAL: NORMAL
MDC_IDC_LEAD_SERIAL: NORMAL
MDC_IDC_LEAD_SPECIAL_FUNCTION: NORMAL
MDC_IDC_LEAD_SPECIAL_FUNCTION: NORMAL
MDC_IDC_MSMT_BATTERY_DTM: NORMAL
MDC_IDC_MSMT_BATTERY_REMAINING_LONGEVITY: 144 MO
MDC_IDC_MSMT_BATTERY_RRT_TRIGGER: 2.62
MDC_IDC_MSMT_BATTERY_STATUS: NORMAL
MDC_IDC_MSMT_BATTERY_VOLTAGE: 3.02 V
MDC_IDC_MSMT_LEADCHNL_RA_IMPEDANCE_VALUE: 456 OHM
MDC_IDC_MSMT_LEADCHNL_RA_IMPEDANCE_VALUE: 532 OHM
MDC_IDC_MSMT_LEADCHNL_RA_PACING_THRESHOLD_AMPLITUDE: 0.5 V
MDC_IDC_MSMT_LEADCHNL_RA_PACING_THRESHOLD_PULSEWIDTH: 0.4 MS
MDC_IDC_MSMT_LEADCHNL_RA_SENSING_INTR_AMPL: 4.12 MV
MDC_IDC_MSMT_LEADCHNL_RV_IMPEDANCE_VALUE: 304 OHM
MDC_IDC_MSMT_LEADCHNL_RV_IMPEDANCE_VALUE: 418 OHM
MDC_IDC_MSMT_LEADCHNL_RV_PACING_THRESHOLD_AMPLITUDE: 0.75 V
MDC_IDC_MSMT_LEADCHNL_RV_PACING_THRESHOLD_PULSEWIDTH: 0.4 MS
MDC_IDC_MSMT_LEADCHNL_RV_SENSING_INTR_AMPL: 3.75 MV
MDC_IDC_PG_IMPLANT_DTM: NORMAL
MDC_IDC_PG_MFG: NORMAL
MDC_IDC_PG_MODEL: NORMAL
MDC_IDC_PG_SERIAL: NORMAL
MDC_IDC_PG_TYPE: NORMAL
MDC_IDC_SESS_CLINIC_NAME: NORMAL
MDC_IDC_SESS_DTM: NORMAL
MDC_IDC_SESS_TYPE: NORMAL
MDC_IDC_SET_BRADY_AT_MODE_SWITCH_RATE: 171 {BEATS}/MIN
MDC_IDC_SET_BRADY_HYSTRATE: NORMAL
MDC_IDC_SET_BRADY_LOWRATE: 60 {BEATS}/MIN
MDC_IDC_SET_BRADY_MAX_SENSOR_RATE: 120 {BEATS}/MIN
MDC_IDC_SET_BRADY_MAX_TRACKING_RATE: 120 {BEATS}/MIN
MDC_IDC_SET_BRADY_MODE: NORMAL
MDC_IDC_SET_BRADY_PAV_DELAY_LOW: 220 MS
MDC_IDC_SET_BRADY_SAV_DELAY_LOW: 200 MS
MDC_IDC_SET_LEADCHNL_RA_PACING_AMPLITUDE: 1.5 V
MDC_IDC_SET_LEADCHNL_RA_PACING_ANODE_ELECTRODE_1: NORMAL
MDC_IDC_SET_LEADCHNL_RA_PACING_ANODE_LOCATION_1: NORMAL
MDC_IDC_SET_LEADCHNL_RA_PACING_CAPTURE_MODE: NORMAL
MDC_IDC_SET_LEADCHNL_RA_PACING_CATHODE_ELECTRODE_1: NORMAL
MDC_IDC_SET_LEADCHNL_RA_PACING_CATHODE_LOCATION_1: NORMAL
MDC_IDC_SET_LEADCHNL_RA_PACING_POLARITY: NORMAL
MDC_IDC_SET_LEADCHNL_RA_PACING_PULSEWIDTH: 0.4 MS
MDC_IDC_SET_LEADCHNL_RA_SENSING_ANODE_ELECTRODE_1: NORMAL
MDC_IDC_SET_LEADCHNL_RA_SENSING_ANODE_LOCATION_1: NORMAL
MDC_IDC_SET_LEADCHNL_RA_SENSING_CATHODE_ELECTRODE_1: NORMAL
MDC_IDC_SET_LEADCHNL_RA_SENSING_CATHODE_LOCATION_1: NORMAL
MDC_IDC_SET_LEADCHNL_RA_SENSING_POLARITY: NORMAL
MDC_IDC_SET_LEADCHNL_RA_SENSING_SENSITIVITY: 0.3 MV
MDC_IDC_SET_LEADCHNL_RV_PACING_AMPLITUDE: 1.5 V
MDC_IDC_SET_LEADCHNL_RV_PACING_ANODE_ELECTRODE_1: NORMAL
MDC_IDC_SET_LEADCHNL_RV_PACING_ANODE_LOCATION_1: NORMAL
MDC_IDC_SET_LEADCHNL_RV_PACING_CAPTURE_MODE: NORMAL
MDC_IDC_SET_LEADCHNL_RV_PACING_CATHODE_ELECTRODE_1: NORMAL
MDC_IDC_SET_LEADCHNL_RV_PACING_CATHODE_LOCATION_1: NORMAL
MDC_IDC_SET_LEADCHNL_RV_PACING_POLARITY: NORMAL
MDC_IDC_SET_LEADCHNL_RV_PACING_PULSEWIDTH: 0.4 MS
MDC_IDC_SET_LEADCHNL_RV_SENSING_ANODE_ELECTRODE_1: NORMAL
MDC_IDC_SET_LEADCHNL_RV_SENSING_ANODE_LOCATION_1: NORMAL
MDC_IDC_SET_LEADCHNL_RV_SENSING_CATHODE_ELECTRODE_1: NORMAL
MDC_IDC_SET_LEADCHNL_RV_SENSING_CATHODE_LOCATION_1: NORMAL
MDC_IDC_SET_LEADCHNL_RV_SENSING_POLARITY: NORMAL
MDC_IDC_SET_LEADCHNL_RV_SENSING_SENSITIVITY: 0.9 MV
MDC_IDC_SET_ZONE_DETECTION_INTERVAL: 350 MS
MDC_IDC_SET_ZONE_DETECTION_INTERVAL: 400 MS
MDC_IDC_SET_ZONE_TYPE: NORMAL
MDC_IDC_STAT_AT_BURDEN_PERCENT: 0 %
MDC_IDC_STAT_AT_DTM_END: NORMAL
MDC_IDC_STAT_AT_DTM_START: NORMAL
MDC_IDC_STAT_BRADY_AP_VP_PERCENT: 2.04 %
MDC_IDC_STAT_BRADY_AP_VS_PERCENT: 0 %
MDC_IDC_STAT_BRADY_AS_VP_PERCENT: 97.94 %
MDC_IDC_STAT_BRADY_AS_VS_PERCENT: 0.03 %
MDC_IDC_STAT_BRADY_DTM_END: NORMAL
MDC_IDC_STAT_BRADY_DTM_START: NORMAL
MDC_IDC_STAT_BRADY_RA_PERCENT_PACED: 2.03 %
MDC_IDC_STAT_BRADY_RV_PERCENT_PACED: 99.97 %
MDC_IDC_STAT_EPISODE_RECENT_COUNT: 0
MDC_IDC_STAT_EPISODE_RECENT_COUNT: 1
MDC_IDC_STAT_EPISODE_RECENT_COUNT_DTM_END: NORMAL
MDC_IDC_STAT_EPISODE_RECENT_COUNT_DTM_START: NORMAL
MDC_IDC_STAT_EPISODE_TOTAL_COUNT: 0
MDC_IDC_STAT_EPISODE_TOTAL_COUNT: 1
MDC_IDC_STAT_EPISODE_TOTAL_COUNT: 264
MDC_IDC_STAT_EPISODE_TOTAL_COUNT_DTM_END: NORMAL
MDC_IDC_STAT_EPISODE_TOTAL_COUNT_DTM_START: NORMAL
MDC_IDC_STAT_EPISODE_TYPE: NORMAL

## 2022-07-28 ENCOUNTER — OFFICE VISIT (OUTPATIENT)
Dept: FAMILY MEDICINE | Facility: CLINIC | Age: 82
End: 2022-07-28
Payer: MEDICARE

## 2022-07-28 VITALS
TEMPERATURE: 97.6 F | WEIGHT: 137 LBS | BODY MASS INDEX: 26.76 KG/M2 | SYSTOLIC BLOOD PRESSURE: 167 MMHG | OXYGEN SATURATION: 99 % | HEART RATE: 93 BPM | RESPIRATION RATE: 15 BRPM | DIASTOLIC BLOOD PRESSURE: 76 MMHG

## 2022-07-28 DIAGNOSIS — E11.59 TYPE 2 DIABETES MELLITUS WITH OTHER CIRCULATORY COMPLICATION, UNSPECIFIED WHETHER LONG TERM INSULIN USE (H): Primary | ICD-10-CM

## 2022-07-28 DIAGNOSIS — I10 ESSENTIAL HYPERTENSION: ICD-10-CM

## 2022-07-28 DIAGNOSIS — N32.9 BLADDER PROBLEM: ICD-10-CM

## 2022-07-28 LAB
ALBUMIN UR-MCNC: NEGATIVE MG/DL
APPEARANCE UR: CLEAR
BILIRUB UR QL STRIP: NEGATIVE
COLOR UR AUTO: YELLOW
GLUCOSE UR STRIP-MCNC: NEGATIVE MG/DL
HBA1C MFR BLD: 6.7 % (ref 0–5.6)
HGB UR QL STRIP: NEGATIVE
KETONES UR STRIP-MCNC: NEGATIVE MG/DL
LEUKOCYTE ESTERASE UR QL STRIP: NEGATIVE
NITRATE UR QL: NEGATIVE
PH UR STRIP: 7 [PH] (ref 5–7)
SP GR UR STRIP: 1.01 (ref 1–1.03)
UROBILINOGEN UR STRIP-ACNC: 0.2 E.U./DL

## 2022-07-28 RX ORDER — LISINOPRIL/HYDROCHLOROTHIAZIDE 10-12.5 MG
1 TABLET ORAL DAILY
Qty: 90 TABLET | Refills: 0 | Status: SHIPPED | OUTPATIENT
Start: 2022-07-28 | End: 2022-10-28

## 2022-07-28 RX ORDER — AMLODIPINE BESYLATE 5 MG/1
5 TABLET ORAL DAILY
Qty: 90 TABLET | Refills: 0 | Status: SHIPPED | OUTPATIENT
Start: 2022-07-28 | End: 2022-10-28

## 2022-07-28 RX ORDER — ROSUVASTATIN CALCIUM 10 MG/1
10 TABLET, COATED ORAL DAILY
Qty: 90 TABLET | Refills: 0 | Status: SHIPPED | OUTPATIENT
Start: 2022-07-28 | End: 2022-10-28

## 2022-07-28 NOTE — NURSING NOTE
81 year old  Chief Complaint   Patient presents with     Diabetes     Check in and labs       Blood pressure (!) 167/76, pulse 93, temperature 97.6  F (36.4  C), temperature source Skin, resp. rate 15, weight 62.1 kg (137 lb), SpO2 99 %. Body mass index is 26.76 kg/m .  Patient Active Problem List   Diagnosis     Type 2 diabetes mellitus with other circulatory complication, unspecified whether long term insulin use (H)     Gait instability     Bradycardia     Cardiac pacemaker in situ     Essential hypertension       Wt Readings from Last 2 Encounters:   07/28/22 62.1 kg (137 lb)   03/28/22 60.8 kg (134 lb)     BP Readings from Last 3 Encounters:   07/28/22 (!) 167/76   03/28/22 (!) 149/76   01/27/22 138/78         Current Outpatient Medications   Medication     amLODIPine (NORVASC) 10 MG tablet     aspirin (ASA) 81 MG chewable tablet     Continuous Blood Gluc  ([place] ANURAG 14 DAY READER) STEVIE     Continuous Blood Gluc Sensor ([place] Village[place]YLE ANURAG 14 DAY SENSOR) MISC     lisinopril (ZESTRIL) 20 MG tablet     Multiple Vitamins-Minerals (MULTIVITAMIN ADULT PO)     simvastatin (ZOCOR) 40 MG tablet     No current facility-administered medications for this visit.       Social History     Tobacco Use     Smoking status: Never Smoker     Smokeless tobacco: Never Used   Substance Use Topics     Alcohol use: Never       Health Maintenance Due   Topic Date Due     ADVANCE CARE PLANNING  Never done     DTAP/TDAP/TD IMMUNIZATION (1 - Tdap) Never done     ZOSTER IMMUNIZATION (1 of 2) Never done     MEDICARE ANNUAL WELLNESS VISIT  Never done     Pneumococcal Vaccine: 65+ Years (2 - PCV) 10/07/2021     COVID-19 Vaccine (4 - Booster for Pfizer series) 03/05/2022     A1C  04/27/2022       No results found for: PAP      July 28, 2022 3:08 PM

## 2022-07-28 NOTE — PROGRESS NOTES
Assessment & Plan   Problem List Items Addressed This Visit        Endocrine    Type 2 diabetes mellitus with other circulatory complication, unspecified whether long term insulin use (H) - Primary    Relevant Medications    rosuvastatin (CRESTOR) 10 MG tablet    Other Relevant Orders    Hemoglobin A1c ([place]) (Completed)       Circulatory    Essential hypertension    Relevant Medications    lisinopril-hydrochlorothiazide (ZESTORETIC) 10-12.5 MG tablet    amLODIPine (NORVASC) 5 MG tablet      Other Visit Diagnoses     Bladder problem        Relevant Orders    Urinalysis Macroscopic (Completed)         Elevated pressures in clinic today. Son insists home pressures wnls. Concern for LE edema with current amlodipine Rx. Changes made to HTN regimen as noted above: decrease lisinopril and amlodipine, add hydrochlorothiazide. Will follow up in 2-3 months time.     Low suspicion for UTI, but will check urine today given son's insistence.     Will follow up A1c as indicated.     Concern for simvastatin and amlodipine interaction. Switching statin to crestor today.     37 minutes spent on the date of the encounter doing chart review, history and exam, documentation and further activities as noted.    Nicholas Velazquez MD  [facility]    Agueda DIOR is a 81 year old presenting for the following health issues:  Diabetes (Check in and labs)      HPI   # Type 2 Diabetes Mellitus  Lab Results   Component Value Date    A1C 6.6 01/27/2022    A1C 6.4 07/26/2021    A1C 6.8 10/07/2020     Lab Results   Component Value Date    CR 1.55 03/09/2022    CR 1.1 10/07/2020     Albumin Urine mg/g Cr (mg/g Cr)   Date Value   02/04/2022 5.19   10/07/2020 136.05 (H)     Creatinine Urine (mg/dL)   Date Value   10/07/2020 172     Creatinine Urine mg/dL (mg/dL)   Date Value   02/04/2022 154     - Current Regimen: Diet changes  - Missed doses: N/A  - Self monitoring blood gluose?: yes  - Hypoglycemic episodes?: no    Wt Readings from  Last 5 Encounters:   03/28/22 60.8 kg (134 lb)   01/27/22 59.9 kg (132 lb)   09/27/21 57 kg (125 lb 11.2 oz)   07/26/21 56.7 kg (125 lb)   11/04/20 53.1 kg (117 lb)   - Exercise: minimal, Ashley is wheelchair bound  - Diet: good    Recent Labs   Lab Test 01/27/22  1436 10/07/20  1128   CHOL 174 203.0*   HDL 56 53.0   LDL 74 110.0   TRIG 222* 198.0*   CHOLHDLRATIO  --  3.8   - History of ASCVD?: No  - On ASA and statin?: Statin yes, ASA no    # Hypertension   BP Readings from Last 5 Encounters:   07/28/22 (!) 167/76   03/28/22 (!) 149/76   01/27/22 138/78   09/27/21 (!) 147/78   07/26/21 (!) 153/79     Creatinine   Date Value Ref Range Status   03/09/2022 1.55 (H) 0.52 - 1.04 mg/dL Final   01/27/2022 1.89 (H) 0.52 - 1.04 mg/dL Final   10/07/2020 1.1 0.6 - 1.3 mg/dL Final     Sodium   Date Value Ref Range Status   03/09/2022 138 133 - 144 mmol/L Final   10/07/2020 142.0 137.3 - 146.3 mmol/L Final     Potassium   Date Value Ref Range Status   03/09/2022 4.5 3.4 - 5.3 mmol/L Final   10/07/2020 4.3 3.4 - 5.3 mmol/L Final     - Current Regimen:     - amlodipine 10mg daily    - lisinopril 20mg daily  - Missed doses?: No  - does acknowledge LE edema intermittently  - Headache, dizziness, blurry vision, chest pain, dyspnea, orthopnea, PND?: No    Review of Systems   Constitutional, HEENT, cardiovascular, pulmonary, gi and gu systems are negative, except as otherwise noted.      Objective    BP (!) 167/76 (BP Location: Left arm, Patient Position: Sitting, Cuff Size: Adult Regular)   Pulse 93   Temp 97.6  F (36.4  C) (Skin)   Resp 15   Wt 62.1 kg (137 lb)   SpO2 99%   BMI 26.76 kg/m    Body mass index is 26.76 kg/m .  Physical Exam   GENERAL: healthy, alert and no distress  NECK: no adenopathy, no asymmetry, masses, or scars and thyroid normal to palpation  RESP: lungs clear to auscultation - no rales, rhonchi or wheezes  CV: bilateral lower extremity edema, LEFT more pronounced than RIGHT; regular rate and rhythm, normal  S1 S2, no S3 or S4, no murmur, click or rub, peripheral pulses strong  ABDOMEN: soft, nontender, no hepatosplenomegaly, no masses and bowel sounds normal  MS: no gross musculoskeletal defects noted, no edema  Diabetic foot exam: normal DP and PT pulses, no trophic changes or ulcerative lesions and reduced sensation along plantar surfaces bilaterally              .  ..

## 2022-08-21 NOTE — PATIENT INSTRUCTIONS
Take 20mg of Lisinopril ( 2 x 10mg)  STOP Carvedilol  Follow up with EP in 6 months - call to schedule : [phone]   EMT/paramedic

## 2022-09-24 ENCOUNTER — HEALTH MAINTENANCE LETTER (OUTPATIENT)
Age: 82
End: 2022-09-24

## 2022-10-28 ENCOUNTER — OFFICE VISIT (OUTPATIENT)
Dept: FAMILY MEDICINE | Facility: CLINIC | Age: 82
End: 2022-10-28
Payer: MEDICARE

## 2022-10-28 VITALS
DIASTOLIC BLOOD PRESSURE: 83 MMHG | HEART RATE: 92 BPM | SYSTOLIC BLOOD PRESSURE: 151 MMHG | BODY MASS INDEX: 26.76 KG/M2 | RESPIRATION RATE: 13 BRPM | WEIGHT: 137 LBS | OXYGEN SATURATION: 100 % | TEMPERATURE: 97.7 F

## 2022-10-28 DIAGNOSIS — I10 ESSENTIAL HYPERTENSION: ICD-10-CM

## 2022-10-28 DIAGNOSIS — E11.59 TYPE 2 DIABETES MELLITUS WITH OTHER CIRCULATORY COMPLICATION, UNSPECIFIED WHETHER LONG TERM INSULIN USE (H): ICD-10-CM

## 2022-10-28 DIAGNOSIS — Z23 NEED FOR PROPHYLACTIC VACCINATION AND INOCULATION AGAINST INFLUENZA: Primary | ICD-10-CM

## 2022-10-28 PROBLEM — R26.81 GAIT INSTABILITY: Status: RESOLVED | Noted: 2020-10-07 | Resolved: 2022-10-28

## 2022-10-28 LAB — HBA1C MFR BLD: 7.2 % (ref 0–5.6)

## 2022-10-28 RX ORDER — FLASH GLUCOSE SENSOR
KIT MISCELLANEOUS
Qty: 2 EACH | Refills: 6 | Status: CANCELLED | OUTPATIENT
Start: 2022-10-28

## 2022-10-28 RX ORDER — FLASH GLUCOSE SENSOR
KIT MISCELLANEOUS
Qty: 8 EACH | Refills: 3 | Status: SHIPPED | OUTPATIENT
Start: 2022-10-28 | End: 2023-04-28

## 2022-10-28 RX ORDER — ROSUVASTATIN CALCIUM 10 MG/1
10 TABLET, COATED ORAL DAILY
Qty: 90 TABLET | Refills: 3 | Status: SHIPPED | OUTPATIENT
Start: 2022-10-28 | End: 2023-10-25

## 2022-10-28 RX ORDER — LISINOPRIL/HYDROCHLOROTHIAZIDE 10-12.5 MG
1 TABLET ORAL DAILY
Qty: 90 TABLET | Refills: 3 | Status: SHIPPED | OUTPATIENT
Start: 2022-10-28 | End: 2023-10-25

## 2022-10-28 NOTE — PROGRESS NOTES
Assessment & Plan   Problem List Items Addressed This Visit        Endocrine    Type 2 diabetes mellitus with other circulatory complication, unspecified whether long term insulin use (H)    Relevant Medications    Continuous Blood Gluc Sensor (FREESTYLE ANURAG 14 DAY SENSOR) MISC    rosuvastatin (CRESTOR) 10 MG tablet    Other Relevant Orders    Hemoglobin A1c (Completed)       Circulatory    Essential hypertension    Relevant Medications    lisinopril-hydrochlorothiazide (ZESTORETIC) 10-12.5 MG tablet   Other Visit Diagnoses     Need for prophylactic vaccination and inoculation against influenza    -  Primary    Relevant Orders    INFLUENZA, QUAD, HIGH DOSE, PF, 65YR + (FLUZONE HD) (Completed)         Refills for meds and diabetes supplies as noted above. No change in med regimens. Plan on follow up in 6 months.     25 minutes spent on the date of the encounter doing chart review, history and exam, documentation and further activities as noted.    Nicholas Velazquez MD  [facility]    Agueda DIOR is a 81 year old accompanied by her son, presenting for the following health issues:  Hypertension, Diabetes, and Imm/Inj (Flu Shot)      HPI   # Type 2 Diabetes Mellitus  Lab Results   Component Value Date    A1C 6.7 07/28/2022    A1C 6.6 01/27/2022    A1C 6.4 07/26/2021    A1C 6.8 10/07/2020     Lab Results   Component Value Date    CR 1.55 03/09/2022    CR 1.1 10/07/2020     Albumin Urine mg/g Cr (mg/g Cr)   Date Value   02/04/2022 5.19   10/07/2020 136.05 (H)     Creatinine Urine (mg/dL)   Date Value   10/07/2020 172     Creatinine Urine mg/dL (mg/dL)   Date Value   02/04/2022 154     - Current Regimen: diet  - Missed doses: N/A  - Self monitoring blood gluose?: yes  - Hypoglycemic episodes?: No    Wt Readings from Last 5 Encounters:   07/28/22 62.1 kg (137 lb)   03/28/22 60.8 kg (134 lb)   01/27/22 59.9 kg (132 lb)   09/27/21 57 kg (125 lb 11.2 oz)   07/26/21 56.7 kg (125 lb)     - Last eye exam: July  2022  - Last foot exam: today  - Last dental exam: semi-annual    Recent Labs   Lab Test 01/27/22  1436 10/07/20  1128   CHOL 174 203.0*   HDL 56 53.0   LDL 74 110.0   TRIG 222* 198.0*   CHOLHDLRATIO  --  3.8   - History of ASCVD?: No  - On ASA and statin?: Yes    Review of Systems   Constitutional, HEENT, cardiovascular, pulmonary, gi and gu systems are negative, except as otherwise noted.      Objective    BP (!) 151/83 (BP Location: Left arm, Patient Position: Sitting, Cuff Size: Adult Regular)   Pulse 92   Temp 97.7  F (36.5  C) (Skin)   Resp 13   Wt 62.1 kg (137 lb)   SpO2 100%   BMI 26.76 kg/m    Body mass index is 26.76 kg/m .     Elevated BP in clinic today. Per son, home pressures consistently 130s/80s    Physical Exam   GENERAL: healthy, alert and no distress  NECK: no adenopathy, no asymmetry, masses, or scars and thyroid normal to palpation  RESP: lungs clear to auscultation - no rales, rhonchi or wheezes  CV: regular rate and rhythm, normal S1 S2, no S3 or S4, no murmur, click or rub, no peripheral edema and peripheral pulses strong  ABDOMEN: soft, nontender, no hepatosplenomegaly, no masses and bowel sounds normal  MS: no gross musculoskeletal defects noted, no edema

## 2022-10-28 NOTE — NURSING NOTE
81 year old  Chief Complaint   Patient presents with     Hypertension     Diabetes       Blood pressure (!) 151/83, pulse 92, temperature 97.7  F (36.5  C), temperature source Skin, resp. rate 13, weight 62.1 kg (137 lb), SpO2 100 %. Body mass index is 26.76 kg/m .  Patient Active Problem List   Diagnosis     Type 2 diabetes mellitus with other circulatory complication, unspecified whether long term insulin use (H)     Gait instability     Bradycardia     Cardiac pacemaker in situ     Essential hypertension       Wt Readings from Last 2 Encounters:   10/28/22 62.1 kg (137 lb)   07/28/22 62.1 kg (137 lb)     BP Readings from Last 3 Encounters:   10/28/22 (!) 151/83   07/28/22 (!) 167/76   03/28/22 (!) 149/76         Current Outpatient Medications   Medication     Continuous Blood Gluc  (ARtunes RadioYLE ANURAG 14 DAY READER) STEVIE     Continuous Blood Gluc Sensor (Encoding.comSTYLE ANURAG 14 DAY SENSOR) MISC     lisinopril-hydrochlorothiazide (ZESTORETIC) 10-12.5 MG tablet     Multiple Vitamins-Minerals (MULTIVITAMIN ADULT PO)     rosuvastatin (CRESTOR) 10 MG tablet     amLODIPine (NORVASC) 10 MG tablet     amLODIPine (NORVASC) 5 MG tablet     aspirin (ASA) 81 MG chewable tablet     lisinopril (ZESTRIL) 20 MG tablet     No current facility-administered medications for this visit.       Social History     Tobacco Use     Smoking status: Never     Smokeless tobacco: Never   Substance Use Topics     Alcohol use: Never       Health Maintenance Due   Topic Date Due     ADVANCE CARE PLANNING  Never done     DTAP/TDAP/TD IMMUNIZATION (1 - Tdap) Never done     ZOSTER IMMUNIZATION (1 of 2) Never done     MEDICARE ANNUAL WELLNESS VISIT  Never done     Pneumococcal Vaccine: 65+ Years (2 - PCV) 10/07/2021     COVID-19 Vaccine (4 - Booster for Pfizer series) 12/31/2021     INFLUENZA VACCINE (1) 09/01/2022     A1C  10/28/2022       No results found for: PAP      October 28, 2022 3:16 PM

## 2022-11-17 ENCOUNTER — IMMUNIZATION (OUTPATIENT)
Dept: NURSING | Facility: CLINIC | Age: 82
End: 2022-11-17
Payer: MEDICARE

## 2022-11-17 PROCEDURE — 0124A COVID-19,PF,PFIZER BOOSTER BIVALENT: CPT

## 2022-11-17 PROCEDURE — 91312 COVID-19,PF,PFIZER BOOSTER BIVALENT: CPT

## 2022-11-23 NOTE — PROGRESS NOTES
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   She was referred to establish a cardiology care.  She has been doing well.  She denied any chest pain, SOB, palpitation or dizsiness.    PAST MEDICAL HISTORY:  Past Medical History:   Diagnosis Date     Cardiac pacemaker in situ 10/07/2020     Type 2 diabetes mellitus without complication, without long-term current use of insulin (H) 10/07/2020       CURRENT MEDICATIONS:  Current Outpatient Medications   Medication Sig Dispense Refill     amLODIPine (NORVASC) 10 MG tablet Take 1 tablet (10 mg) by mouth daily 90 tablet 2     aspirin (ASA) 81 MG chewable tablet Take 1 tablet (81 mg) by mouth daily 90 tablet 3     Continuous Blood Gluc  (FREESTYLE ANURAG 14 DAY READER) STEVIE Use to read blood sugars as per 's instructions. 1 each 0     Continuous Blood Gluc Sensor (FREESTYLE ANURAG 14 DAY SENSOR) MISC Change every 14 days. 2 each 6     lisinopril (ZESTRIL) 20 MG tablet Take 1 tablet (20 mg) by mouth daily 90 tablet 2     Multiple Vitamins-Minerals (MULTIVITAMIN ADULT PO)        simvastatin (ZOCOR) 40 MG tablet Take 1 tablet (40 mg) by mouth At Bedtime 90 tablet 3     famotidine (PEPCID) 20 MG tablet Take 20 mg by mouth 2 times daily (Patient not taking: Reported on 3/28/2022)         PAST SURGICAL HISTORY:  Past Surgical History:   Procedure Laterality Date     IMPLANT PACEMAKER         ALLERGIES:   No Known Allergies    FAMILY HISTORY:  - Premature coronary artery disease  - Atrial fibrillation  - Sudden cardiac death     SOCIAL HISTORY:  Social History     Tobacco Use     Smoking status: Never Smoker     Smokeless tobacco: Never Used   Substance Use Topics     Alcohol use: Never     Drug use: None       ROS:   Constitutional: No fever, chills, or sweats. Weight stable.   ENT: No visual disturbance, ear ache, epistaxis, sore throat.   Cardiovascular:  Message routed to provider for recommendations.     As per HPI.   Respiratory: No cough, hemoptysis.    GI: No nausea, vomiting, hematemesis, melena, or hematochezia.   : No hematuria.   Integument: Negative.   Psychiatric: Negative.   Hematologic:  Easy bruising, no easy bleeding.  Neuro: Negative.   Endocrinology: No significant heat or cold intolerance   Musculoskeletal: No myalgia.    Exam:  BP (!) 149/76 (BP Location: Right arm, Patient Position: Chair, Cuff Size: Adult Regular)   Pulse 95   Ht 1.524 m (5')   Wt 60.8 kg (134 lb)   SpO2 99%   BMI 26.17 kg/m    GENERAL APPEARANCE: healthy, alert and no distress  HEENT: no icterus, no xanthelasmas, normal pupil size and reaction, normal palate, mucosa moist, no central cyanosis  NECK: no adenopathy, no asymmetry, masses, or scars, thyroid normal to palpation and no bruits, JVP not elevated  RESPIRATORY: lungs clear to auscultation - no rales, rhonchi or wheezes, no use of accessory muscles, no retractions, respirations are unlabored, normal respiratory rate  CARDIOVASCULAR: regular rhythm, normal S1 with physiologic split S2, no S3 or S4 and no murmur, click or rub, precordium quiet with normal PMI.  ABDOMEN: soft, non tender, without hepatosplenomegaly, no masses palpable, bowel sounds normal, aorta not enlarged by palpation, no abdominal bruits  EXTREMITIES: peripheral pulses normal, no edema, no bruits  NEURO: alert and oriented to person/place/time, normal speech, gait and affect  VASC: Radial, femoral, dorsalis pedis and posterior tibialis pulses are normal in volumes and symmetric bilaterally. No bruits are heard.  SKIN: no ecchymoses, no rashes    Labs:  CBC RESULTS:   No results found for: WBC, RBC, HGB, HCT, MCV, MCH, MCHC, RDW, PLT    BMP RESULTS:  Lab Results   Component Value Date     03/09/2022    .0 10/07/2020    POTASSIUM 4.5 03/09/2022    POTASSIUM 4.3 10/07/2020    CHLORIDE 105 03/09/2022    CHLORIDE 102.0 10/07/2020    CO2 27 03/09/2022    CO2 28.0 10/07/2020    ANIONGAP 6  03/09/2022     (H) 03/09/2022    GLC 77.0 10/07/2020    BUN 24 03/09/2022    BUN 14.0 10/07/2020    CR 1.55 (H) 03/09/2022    CR 1.1 10/07/2020    GFRESTIMATED 33 (L) 03/09/2022    YOGI 9.2 03/09/2022    YOGI 9.1 10/07/2020        INR RESULTS:  No results found for: INR    Procedures:  Pacemaker interrogation on 9/2/2021: Reviewed.  Remote pacemaker transmission received and reviewed. Device transmission sent per MD orders.     Device: Medtronic [id number] [place] XT DR CONTRERAS  Normal Device Function.  Mode: DDD  bpm  AP: 3.1%  : 5.8%  Presenting EGM: AS/VS @ 90 bpm  Short V-V intervals: 0  Lead Trends Appear Stable  Estimated battery longevity to RRT = 13.1 years.   Atrial Arrhythmia: 18 AT episodes lasting up to 3.5 minutes.  AF [place]: <0.1%  Anticoagulant: None  Ventricular Arrhythmia: None  Pt Notified of Transmission Results: My Chart. Dr. العراقي had previously asked pt to f/u with EP. She has been reminded to make that appointment.     Plan: Send a remote transmission in 3 months.  NORMAN Vo RN     Remote pacemaker transmission     I have reviewed and interpreted the device interrogation, settings, programming and nurse's summary. The device is functioning within normal device parameters. I agree with the current findings, assessment and plan.    Pacemaker interrogation on 3/28/2022: Reviewed.  Patient seen in [facility] [place] for [place] and iterative programming of their pacemaker per MD orders.     Device: Medtronic [place] XT   Normal device function  Mode: DDD  bpm  AP: 6%  : 50%  Intrinsic Rhythm: CHB: SR @ 97 bpm/  @ 30 bpm  Short V-V intervals: 0  Lead Trends Appear Stable: yes  Estimated battery longevity to RRT = 12 years. Battery voltage = 3.02V.   Atrial Arrhythmia: 5 AT/AF.  AF [place] = <1%  Anticoagulant: none  Ventricular Arrhythmia: 0  Setting Changes: none  Patient has an appointment to see Dr. Hilton today.      Plan: Pt will send a remote transmission in 3 months and return to clinic  in 12 months.   Nicholas PEDRO RN     dual lead pacemaker     I have reviewed and interpreted the device interrogation, settings, programming and nurse's summary. The device is functioning within normal device parameters. I agree with the current findings, assessment and plan.    Assessment and Plan:  # DM II  # HTN Well controlled on Norvasc 10 mg daily.  # Advanced AV block s/p MDT dual chamber pacemaker placement on 9/5/2020.  # PAF  AF burden was <1% per pacemaker interrogation on 3/28/2022.  We will keep monitoring.  She has been doing well and her pacemaker has been functioning appropriately.  I advised her to continue the same medication.  I will see her back in a year.      I spent a total of 30 min today to review the records, see the patient, and complete the documents.    CC  Patient Care Team:  Nicholas Velazquez MD as PCP - General (Family Practice)  Marisela Campoverde RD as Diabetes Educator (Dietitian, Registered)  Nicholas Velazquez MD as Assigned PCP  Eileen Hilton MD as Cardiologist (Cardiovascular Disease)  Eileen Hilton MD as Assigned Heart and Vascular Provider  ANURAG BEARDEN

## 2022-12-08 ENCOUNTER — TRANSFERRED RECORDS (OUTPATIENT)
Dept: HEALTH INFORMATION MANAGEMENT | Facility: CLINIC | Age: 82
End: 2022-12-08

## 2022-12-08 LAB — RETINOPATHY: NEGATIVE

## 2022-12-19 ENCOUNTER — OFFICE VISIT (OUTPATIENT)
Dept: CARDIOLOGY | Facility: CLINIC | Age: 82
End: 2022-12-19
Attending: INTERNAL MEDICINE
Payer: MEDICARE

## 2022-12-19 VITALS
OXYGEN SATURATION: 98 % | HEIGHT: 60 IN | SYSTOLIC BLOOD PRESSURE: 173 MMHG | WEIGHT: 140.6 LBS | HEART RATE: 79 BPM | DIASTOLIC BLOOD PRESSURE: 91 MMHG | BODY MASS INDEX: 27.61 KG/M2

## 2022-12-19 DIAGNOSIS — I44.2 HEART BLOCK AV COMPLETE (H): ICD-10-CM

## 2022-12-19 DIAGNOSIS — I48.0 PAROXYSMAL ATRIAL FIBRILLATION (H): Primary | ICD-10-CM

## 2022-12-19 DIAGNOSIS — Z95.0 CARDIAC PACEMAKER IN SITU: ICD-10-CM

## 2022-12-19 DIAGNOSIS — R00.1 BRADYCARDIA: ICD-10-CM

## 2022-12-19 LAB
MDC_IDC_LEAD_IMPLANT_DT: NORMAL
MDC_IDC_LEAD_IMPLANT_DT: NORMAL
MDC_IDC_LEAD_LOCATION: NORMAL
MDC_IDC_LEAD_LOCATION: NORMAL
MDC_IDC_LEAD_LOCATION_DETAIL_1: NORMAL
MDC_IDC_LEAD_LOCATION_DETAIL_1: NORMAL
MDC_IDC_LEAD_MFG: NORMAL
MDC_IDC_LEAD_MFG: NORMAL
MDC_IDC_LEAD_MODEL: NORMAL
MDC_IDC_LEAD_MODEL: NORMAL
MDC_IDC_LEAD_POLARITY_TYPE: NORMAL
MDC_IDC_LEAD_POLARITY_TYPE: NORMAL
MDC_IDC_LEAD_SERIAL: NORMAL
MDC_IDC_LEAD_SERIAL: NORMAL
MDC_IDC_LEAD_SPECIAL_FUNCTION: NORMAL
MDC_IDC_LEAD_SPECIAL_FUNCTION: NORMAL
MDC_IDC_MSMT_BATTERY_DTM: NORMAL
MDC_IDC_MSMT_BATTERY_REMAINING_LONGEVITY: 131 MO
MDC_IDC_MSMT_BATTERY_RRT_TRIGGER: 2.62
MDC_IDC_MSMT_BATTERY_STATUS: NORMAL
MDC_IDC_MSMT_BATTERY_VOLTAGE: 3.01 V
MDC_IDC_MSMT_LEADCHNL_RA_IMPEDANCE_VALUE: 513 OHM
MDC_IDC_MSMT_LEADCHNL_RA_IMPEDANCE_VALUE: 551 OHM
MDC_IDC_MSMT_LEADCHNL_RA_PACING_THRESHOLD_AMPLITUDE: 0.5 V
MDC_IDC_MSMT_LEADCHNL_RA_PACING_THRESHOLD_PULSEWIDTH: 0.4 MS
MDC_IDC_MSMT_LEADCHNL_RA_SENSING_INTR_AMPL: 3.38 MV
MDC_IDC_MSMT_LEADCHNL_RA_SENSING_INTR_AMPL: 3.75 MV
MDC_IDC_MSMT_LEADCHNL_RV_IMPEDANCE_VALUE: 342 OHM
MDC_IDC_MSMT_LEADCHNL_RV_IMPEDANCE_VALUE: 456 OHM
MDC_IDC_MSMT_LEADCHNL_RV_PACING_THRESHOLD_AMPLITUDE: 1 V
MDC_IDC_MSMT_LEADCHNL_RV_PACING_THRESHOLD_PULSEWIDTH: 0.4 MS
MDC_IDC_PG_IMPLANT_DTM: NORMAL
MDC_IDC_PG_MFG: NORMAL
MDC_IDC_PG_MODEL: NORMAL
MDC_IDC_PG_SERIAL: NORMAL
MDC_IDC_PG_TYPE: NORMAL
MDC_IDC_SESS_CLINIC_NAME: NORMAL
MDC_IDC_SESS_DTM: NORMAL
MDC_IDC_SESS_TYPE: NORMAL
MDC_IDC_SET_BRADY_AT_MODE_SWITCH_RATE: 171 {BEATS}/MIN
MDC_IDC_SET_BRADY_HYSTRATE: NORMAL
MDC_IDC_SET_BRADY_LOWRATE: 60 {BEATS}/MIN
MDC_IDC_SET_BRADY_MAX_SENSOR_RATE: 120 {BEATS}/MIN
MDC_IDC_SET_BRADY_MAX_TRACKING_RATE: 120 {BEATS}/MIN
MDC_IDC_SET_BRADY_MODE: NORMAL
MDC_IDC_SET_BRADY_PAV_DELAY_LOW: 220 MS
MDC_IDC_SET_BRADY_SAV_DELAY_LOW: 200 MS
MDC_IDC_SET_LEADCHNL_RA_PACING_AMPLITUDE: 1.5 V
MDC_IDC_SET_LEADCHNL_RA_PACING_ANODE_ELECTRODE_1: NORMAL
MDC_IDC_SET_LEADCHNL_RA_PACING_ANODE_LOCATION_1: NORMAL
MDC_IDC_SET_LEADCHNL_RA_PACING_CAPTURE_MODE: NORMAL
MDC_IDC_SET_LEADCHNL_RA_PACING_CATHODE_ELECTRODE_1: NORMAL
MDC_IDC_SET_LEADCHNL_RA_PACING_CATHODE_LOCATION_1: NORMAL
MDC_IDC_SET_LEADCHNL_RA_PACING_POLARITY: NORMAL
MDC_IDC_SET_LEADCHNL_RA_PACING_PULSEWIDTH: 0.4 MS
MDC_IDC_SET_LEADCHNL_RA_SENSING_ANODE_ELECTRODE_1: NORMAL
MDC_IDC_SET_LEADCHNL_RA_SENSING_ANODE_LOCATION_1: NORMAL
MDC_IDC_SET_LEADCHNL_RA_SENSING_CATHODE_ELECTRODE_1: NORMAL
MDC_IDC_SET_LEADCHNL_RA_SENSING_CATHODE_LOCATION_1: NORMAL
MDC_IDC_SET_LEADCHNL_RA_SENSING_POLARITY: NORMAL
MDC_IDC_SET_LEADCHNL_RA_SENSING_SENSITIVITY: 0.3 MV
MDC_IDC_SET_LEADCHNL_RV_PACING_AMPLITUDE: 2 V
MDC_IDC_SET_LEADCHNL_RV_PACING_ANODE_ELECTRODE_1: NORMAL
MDC_IDC_SET_LEADCHNL_RV_PACING_ANODE_LOCATION_1: NORMAL
MDC_IDC_SET_LEADCHNL_RV_PACING_CAPTURE_MODE: NORMAL
MDC_IDC_SET_LEADCHNL_RV_PACING_CATHODE_ELECTRODE_1: NORMAL
MDC_IDC_SET_LEADCHNL_RV_PACING_CATHODE_LOCATION_1: NORMAL
MDC_IDC_SET_LEADCHNL_RV_PACING_POLARITY: NORMAL
MDC_IDC_SET_LEADCHNL_RV_PACING_PULSEWIDTH: 0.4 MS
MDC_IDC_SET_LEADCHNL_RV_SENSING_ANODE_ELECTRODE_1: NORMAL
MDC_IDC_SET_LEADCHNL_RV_SENSING_ANODE_LOCATION_1: NORMAL
MDC_IDC_SET_LEADCHNL_RV_SENSING_CATHODE_ELECTRODE_1: NORMAL
MDC_IDC_SET_LEADCHNL_RV_SENSING_CATHODE_LOCATION_1: NORMAL
MDC_IDC_SET_LEADCHNL_RV_SENSING_POLARITY: NORMAL
MDC_IDC_SET_LEADCHNL_RV_SENSING_SENSITIVITY: 0.9 MV
MDC_IDC_SET_ZONE_DETECTION_INTERVAL: 350 MS
MDC_IDC_SET_ZONE_DETECTION_INTERVAL: 400 MS
MDC_IDC_SET_ZONE_TYPE: NORMAL
MDC_IDC_STAT_AT_BURDEN_PERCENT: 0.1 %
MDC_IDC_STAT_AT_DTM_END: NORMAL
MDC_IDC_STAT_AT_DTM_START: NORMAL
MDC_IDC_STAT_BRADY_AP_VP_PERCENT: 13.35 %
MDC_IDC_STAT_BRADY_AP_VS_PERCENT: 0 %
MDC_IDC_STAT_BRADY_AS_VP_PERCENT: 86.62 %
MDC_IDC_STAT_BRADY_AS_VS_PERCENT: 0.03 %
MDC_IDC_STAT_BRADY_DTM_END: NORMAL
MDC_IDC_STAT_BRADY_DTM_START: NORMAL
MDC_IDC_STAT_BRADY_RA_PERCENT_PACED: 13.34 %
MDC_IDC_STAT_BRADY_RV_PERCENT_PACED: 99.97 %
MDC_IDC_STAT_EPISODE_RECENT_COUNT: 0
MDC_IDC_STAT_EPISODE_RECENT_COUNT: 0
MDC_IDC_STAT_EPISODE_RECENT_COUNT_DTM_END: NORMAL
MDC_IDC_STAT_EPISODE_RECENT_COUNT_DTM_END: NORMAL
MDC_IDC_STAT_EPISODE_RECENT_COUNT_DTM_START: NORMAL
MDC_IDC_STAT_EPISODE_RECENT_COUNT_DTM_START: NORMAL
MDC_IDC_STAT_EPISODE_TOTAL_COUNT: 0
MDC_IDC_STAT_EPISODE_TOTAL_COUNT: 1
MDC_IDC_STAT_EPISODE_TOTAL_COUNT: 264
MDC_IDC_STAT_EPISODE_TOTAL_COUNT_DTM_END: NORMAL
MDC_IDC_STAT_EPISODE_TOTAL_COUNT_DTM_START: NORMAL
MDC_IDC_STAT_EPISODE_TYPE: NORMAL

## 2022-12-19 PROCEDURE — 93280 PM DEVICE PROGR EVAL DUAL: CPT | Performed by: INTERNAL MEDICINE

## 2022-12-19 PROCEDURE — G0463 HOSPITAL OUTPT CLINIC VISIT: HCPCS | Performed by: INTERNAL MEDICINE

## 2022-12-19 PROCEDURE — G0463 HOSPITAL OUTPT CLINIC VISIT: HCPCS | Mod: 25

## 2022-12-19 PROCEDURE — 99214 OFFICE O/P EST MOD 30 MIN: CPT | Mod: 25 | Performed by: INTERNAL MEDICINE

## 2022-12-19 ASSESSMENT — PAIN SCALES - GENERAL: PAINLEVEL: NO PAIN (0)

## 2022-12-19 NOTE — PROGRESS NOTES
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   She was referred to establish a cardiology care.  She has been doing well.  She denied any chest pain, SOB, palpitation or dizsiness.    She is now seen for a follow up.    She complained of bilateral  leg pain.    PAST MEDICAL HISTORY:  Past Medical History:   Diagnosis Date     Cardiac pacemaker in situ 10/07/2020     Type 2 diabetes mellitus without complication, without long-term current use of insulin (H) 10/07/2020       CURRENT MEDICATIONS:  Current Outpatient Medications   Medication Sig Dispense Refill     Continuous Blood Gluc  (FREESTYLE ANURAG 14 DAY READER) STEVIE Use to read blood sugars as per 's instructions. 1 each 0     Continuous Blood Gluc Sensor (FREESTYLE ANURAG 14 DAY SENSOR) MISC Change every 14 days. 8 each 3     lisinopril-hydrochlorothiazide (ZESTORETIC) 10-12.5 MG tablet Take 1 tablet by mouth daily 90 tablet 3     Multiple Vitamins-Minerals (MULTIVITAMIN ADULT PO)        rosuvastatin (CRESTOR) 10 MG tablet Take 1 tablet (10 mg) by mouth daily 90 tablet 3       PAST SURGICAL HISTORY:  Past Surgical History:   Procedure Laterality Date     IMPLANT PACEMAKER         ALLERGIES:   No Known Allergies    FAMILY HISTORY:  - Premature coronary artery disease  - Atrial fibrillation  - Sudden cardiac death     SOCIAL HISTORY:  Social History     Tobacco Use     Smoking status: Never     Smokeless tobacco: Never   Substance Use Topics     Alcohol use: Never       ROS:   Constitutional: No fever, chills, or sweats. Weight stable.   ENT: No visual disturbance, ear ache, epistaxis, sore throat.   Cardiovascular: As per HPI.   Respiratory: No cough, hemoptysis.    GI: No nausea, vomiting, hematemesis, melena, or hematochezia.   : No hematuria.   Integument: Negative.   Psychiatric: Negative.   Hematologic:  Easy bruising, no easy  "bleeding.  Neuro: Negative.   Endocrinology: No significant heat or cold intolerance   Musculoskeletal: No myalgia.    Exam:  BP (!) 173/91 (BP Location: Right arm, Patient Position: Chair, Cuff Size: Adult Regular)   Pulse 79   Ht 1.525 m (5' 0.04\")   Wt 63.8 kg (140 lb 9.6 oz)   SpO2 98%   BMI 27.42 kg/m    GENERAL APPEARANCE: healthy, alert and no distress  HEENT: no icterus, no xanthelasmas, normal pupil size and reaction, normal palate, mucosa moist, no central cyanosis  NECK: no adenopathy, no asymmetry, masses, or scars, thyroid normal to palpation and no bruits, JVP not elevated  RESPIRATORY: lungs clear to auscultation - no rales, rhonchi or wheezes, no use of accessory muscles, no retractions, respirations are unlabored, normal respiratory rate  CARDIOVASCULAR: regular rhythm, normal S1 with physiologic split S2, no S3 or S4 and no murmur, click or rub, precordium quiet with normal PMI.  ABDOMEN: soft, non tender, without hepatosplenomegaly, no masses palpable, bowel sounds normal, aorta not enlarged by palpation, no abdominal bruits  EXTREMITIES: peripheral pulses normal, no edema, no bruits  NEURO: alert and oriented to person/place/time, normal speech, gait and affect  VASC: Radial, femoral, dorsalis pedis and posterior tibialis pulses are normal in volumes and symmetric bilaterally. No bruits are heard.  SKIN: no ecchymoses, no rashes    Labs:  CBC RESULTS:   No results found for: WBC, RBC, HGB, HCT, MCV, MCH, MCHC, RDW, PLT    BMP RESULTS:  Lab Results   Component Value Date     03/09/2022    .0 10/07/2020    POTASSIUM 4.5 03/09/2022    POTASSIUM 4.3 10/07/2020    CHLORIDE 105 03/09/2022    CHLORIDE 102.0 10/07/2020    CO2 27 03/09/2022    CO2 28.0 10/07/2020    ANIONGAP 6 03/09/2022     (H) 03/09/2022    GLC 77.0 10/07/2020    BUN 24 03/09/2022    BUN 14.0 10/07/2020    CR 1.55 (H) 03/09/2022    CR 1.1 10/07/2020    GFRESTIMATED 33 (L) 03/09/2022    YOGI 9.2 03/09/2022    YOGI " 9.1 10/07/2020        INR RESULTS:  No results found for: INR    Procedures:  Pacemaker interrogation on 9/2/2021: Reviewed.  Remote pacemaker transmission received and reviewed. Device transmission sent per MD orders.     Device: Medtronic [id number] [place] XT DR CONTRERAS  Normal Device Function.  Mode: DDD  bpm  AP: 3.1%  : 5.8%  Presenting EGM: AS/VS @ 90 bpm  Short V-V intervals: 0  Lead Trends Appear Stable  Estimated battery longevity to RRT = 13.1 years.   Atrial Arrhythmia: 18 AT episodes lasting up to 3.5 minutes.  AF [place]: <0.1%  Anticoagulant: None  Ventricular Arrhythmia: None  Pt Notified of Transmission Results: My Chart. Dr. العراقي had previously asked pt to f/u with EP. She has been reminded to make that appointment.     Plan: Send a remote transmission in 3 months.  NORMAN Vo RN     Remote pacemaker transmission     I have reviewed and interpreted the device interrogation, settings, programming and nurse's summary. The device is functioning within normal device parameters. I agree with the current findings, assessment and plan.    Pacemaker interrogation on 3/28/2022: Reviewed.  Patient seen in [facility] [place] for evaluation and iterative programming of their pacemaker per MD orders.     Device: Medtronic [place] XT   Normal device function  Mode: DDD  bpm  AP: 6%  : 50%  Intrinsic Rhythm: CHB: SR @ 97 bpm/  @ 30 bpm  Short V-V intervals: 0  Lead Trends Appear Stable: yes  Estimated battery longevity to RRT = 12 years. Battery voltage = 3.02V.   Atrial Arrhythmia: 5 AT/AF.  AF [place] = <1%  Anticoagulant: none  Ventricular Arrhythmia: 0  Setting Changes: none  Patient has an appointment to see Dr. Hilton today.      Plan: Pt will send a remote transmission in 3 months and return to clinic in 12 months.   Nicholas PEDRO RN     dual lead pacemaker     I have reviewed and interpreted the device interrogation, settings, programming and nurse's summary. The device is functioning within normal device parameters.  I agree with the current findings, assessment and plan.    Pacemaker interrogation on 12/19/2022: Reviewed.  Patient seen in clinic for evaluation and iterative programming of their pacemaker per MD orders.     Device: Medtronic [id number] [place] XT DR CONTRERAS  Normal device function  Mode: DDD  bpm  AP: 13.3%  : 100%  Intrinsic Rhythm: NSR with  @ 30 bpm  Short V-V intervals: 0  Lead Trends Appear Stable: yes  Estimated battery longevity to RRT = 10.8 years  Atrial Arrhythmia: Total AT/AF time = 17 seconds. No stored EGM's for review.  AF [place] = <0.1%  Anticoagulant: None  Ventricular Arrhythmia: 0  Setting Changes: None  Patient has an appointment to see Dr. Hilton today.      Plan: Pt will send a remote transmission every 3 months and return to clinic in 1 year.  IDA Rodriguez RN     Dual lead pacemaker     I have reviewed and interpreted the device interrogation, settings, programming and nurse's summary. The device is functioning within normal device parameters. I agree with the current findings, assessment and plan.    Assessment and Plan:  # DM II  # HTN Well controlled on Norvasc 10 mg daily.  # Nathen leg pain  It is likely to be caused by back issue rather than DM neuropathy.  I advised her to consult orthopedics.   # Advanced AV block s/p MDT dual chamber pacemaker placement on 9/5/2020.  # PAF  AF burden was <0.1% per pacemaker interrogation on 12/19/2022.  We will keep monitoring.  She has been doing well and her pacemaker has been functioning appropriately.  I advised her to continue the same medication.  I will see her back in a year.    I spent a total of 30 min today to review the records, see the patient, and complete the documents.    CC  Patient Care Team:  Nicholas Velazquez MD as PCP - General (Family Practice)  Marsiela Campoverde RD as Diabetes Educator (Dietitian, Registered)  Nicholas Velazquez MD as Assigned PCP  Eileen Hilton MD as Cardiologist (Cardiovascular Disease)  Eileen Hilton MD as  Assigned Heart and Vascular Provider  ANURAG BEARDEN

## 2022-12-19 NOTE — NURSING NOTE
Chief Complaint   Patient presents with     Follow Up     Return EP- 9 mo f/u for AV Block, ppm     Vitals were taken and medications reconciled.    Ganga Larsen, EMT  4:23 PM

## 2022-12-19 NOTE — PATIENT INSTRUCTIONS
It was a pleasure to see you in clinic today.  Please do not hesitate to call with any questions or concerns.  You are scheduled for remote transmissions on 3/21/23, 6/26/23 and 9/28/23.  We look forward to seeing you in clinic at your next device check in 1 year.    Shira Rodriguez, RN, MS, CCRN  Electrophysiology Nurse Clinician  [facility]    During Business Hours Please Call:  [phone]  After Hours Please Call:  [phone] - select option #4 and ask for job code [phone]

## 2022-12-19 NOTE — LETTER
12/19/2022      RE: Ashley Lynn  [address]  [facility] [address]       Dear Colleague,    Thank you for the opportunity to participate in the care of your patient, Ashley Lynn, at the [facility] [place] [facility]. Please see a copy of my visit note below.    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   She was referred to establish a cardiology care.  She has been doing well.  She denied any chest pain, SOB, palpitation or dizsiness.    She is now seen for a follow up.    She complained of bilateral  leg pain.    PAST MEDICAL HISTORY:  Past Medical History:   Diagnosis Date     Cardiac pacemaker in situ 10/07/2020     Type 2 diabetes mellitus without complication, without long-term current use of insulin (H) 10/07/2020       CURRENT MEDICATIONS:  Current Outpatient Medications   Medication Sig Dispense Refill     Continuous Blood Gluc  (FREESTYLE ANURAG 14 DAY READER) STEVIE Use to read blood sugars as per 's instructions. 1 each 0     Continuous Blood Gluc Sensor (FREESTYLE ANURAG 14 DAY SENSOR) MISC Change every 14 days. 8 each 3     lisinopril-hydrochlorothiazide (ZESTORETIC) 10-12.5 MG tablet Take 1 tablet by mouth daily 90 tablet 3     Multiple Vitamins-Minerals (MULTIVITAMIN ADULT PO)        rosuvastatin (CRESTOR) 10 MG tablet Take 1 tablet (10 mg) by mouth daily 90 tablet 3       PAST SURGICAL HISTORY:  Past Surgical History:   Procedure Laterality Date     IMPLANT PACEMAKER         ALLERGIES:   No Known Allergies    FAMILY HISTORY:  - Premature coronary artery disease  - Atrial fibrillation  - Sudden cardiac death     SOCIAL HISTORY:  Social History     Tobacco Use     Smoking status: Never     Smokeless tobacco: Never   Substance Use Topics     Alcohol use: Never       ROS:   Constitutional: No fever,  "chills, or sweats. Weight stable.   ENT: No visual disturbance, ear ache, epistaxis, sore throat.   Cardiovascular: As per HPI.   Respiratory: No cough, hemoptysis.    GI: No nausea, vomiting, hematemesis, melena, or hematochezia.   : No hematuria.   Integument: Negative.   Psychiatric: Negative.   Hematologic:  Easy bruising, no easy bleeding.  Neuro: Negative.   Endocrinology: No significant heat or cold intolerance   Musculoskeletal: No myalgia.    Exam:  BP (!) 173/91 (BP Location: Right arm, Patient Position: Chair, Cuff Size: Adult Regular)   Pulse 79   Ht 1.525 m (5' 0.04\")   Wt 63.8 kg (140 lb 9.6 oz)   SpO2 98%   BMI 27.42 kg/m    GENERAL APPEARANCE: healthy, alert and no distress  HEENT: no icterus, no xanthelasmas, normal pupil size and reaction, normal palate, mucosa moist, no central cyanosis  NECK: no adenopathy, no asymmetry, masses, or scars, thyroid normal to palpation and no bruits, JVP not elevated  RESPIRATORY: lungs clear to auscultation - no rales, rhonchi or wheezes, no use of accessory muscles, no retractions, respirations are unlabored, normal respiratory rate  CARDIOVASCULAR: regular rhythm, normal S1 with physiologic split S2, no S3 or S4 and no murmur, click or rub, precordium quiet with normal PMI.  ABDOMEN: soft, non tender, without hepatosplenomegaly, no masses palpable, bowel sounds normal, aorta not enlarged by palpation, no abdominal bruits  EXTREMITIES: peripheral pulses normal, no edema, no bruits  NEURO: alert and oriented to person/place/time, normal speech, gait and affect  VASC: Radial, femoral, dorsalis pedis and posterior tibialis pulses are normal in volumes and symmetric bilaterally. No bruits are heard.  SKIN: no ecchymoses, no rashes    Labs:  CBC RESULTS:   No results found for: WBC, RBC, HGB, HCT, MCV, MCH, MCHC, RDW, PLT    BMP RESULTS:  Lab Results   Component Value Date     03/09/2022    .0 10/07/2020    POTASSIUM 4.5 03/09/2022    POTASSIUM 4.3 " 10/07/2020    CHLORIDE 105 03/09/2022    CHLORIDE 102.0 10/07/2020    CO2 27 03/09/2022    CO2 28.0 10/07/2020    ANIONGAP 6 03/09/2022     (H) 03/09/2022    GLC 77.0 10/07/2020    BUN 24 03/09/2022    BUN 14.0 10/07/2020    CR 1.55 (H) 03/09/2022    CR 1.1 10/07/2020    GFRESTIMATED 33 (L) 03/09/2022    YOGI 9.2 03/09/2022    YOGI 9.1 10/07/2020        INR RESULTS:  No results found for: INR    Procedures:  Pacemaker interrogation on 9/2/2021: Reviewed.  Remote pacemaker transmission received and reviewed. Device transmission sent per MD orders.     Device: Medtronic [id number] Ludy XT DR CONTRERAS  Normal Device Function.  Mode: DDD  bpm  AP: 3.1%  : 5.8%  Presenting EGM: AS/VS @ 90 bpm  Short V-V intervals: 0  Lead Trends Appear Stable  Estimated battery longevity to RRT = 13.1 years.   Atrial Arrhythmia: 18 AT episodes lasting up to 3.5 minutes.  AF [place]: <0.1%  Anticoagulant: None  Ventricular Arrhythmia: None  Pt Notified of Transmission Results: My Chart. Dr. العراقي had previously asked pt to f/u with EP. She has been reminded to make that appointment.     Plan: Send a remote transmission in 3 months.  NORMAN Vo RN     Remote pacemaker transmission     I have reviewed and interpreted the device interrogation, settings, programming and nurse's summary. The device is functioning within normal device parameters. I agree with the current findings, assessment and plan.    Pacemaker interrogation on 3/28/2022: Reviewed.  Patient seen in [facility] for evaluation and iterative programming of their pacemaker per MD orders.     Device: Medtronic Ludy XT   Normal device function  Mode: DDD  bpm  AP: 6%  : 50%  Intrinsic Rhythm: CHB: SR @ 97 bpm/  @ 30 bpm  Short V-V intervals: 0  Lead Trends Appear Stable: yes  Estimated battery longevity to RRT = 12 years. Battery voltage = 3.02V.   Atrial Arrhythmia: 5 AT/AF.  AF [place] = <1%  Anticoagulant: none  Ventricular Arrhythmia: 0  Setting Changes:  none  Patient has an appointment to see Dr. Hilton today.      Plan: Pt will send a remote transmission in 3 months and return to clinic in 12 months.   Nicholas PEDRO RN     dual lead pacemaker     I have reviewed and interpreted the device interrogation, settings, programming and nurse's summary. The device is functioning within normal device parameters. I agree with the current findings, assessment and plan.    Pacemaker interrogation on 12/19/2022: Reviewed.  Patient seen in clinic for evaluation and iterative programming of their pacemaker per MD orders.     Device: Medtronic [id number] Ludy XT DR CONTRERAS  Normal device function  Mode: DDD  bpm  AP: 13.3%  : 100%  Intrinsic Rhythm: NSR with  @ 30 bpm  Short V-V intervals: 0  Lead Trends Appear Stable: yes  Estimated battery longevity to RRT = 10.8 years  Atrial Arrhythmia: Total AT/AF time = 17 seconds. No stored EGM's for review.  AF [place] = <0.1%  Anticoagulant: None  Ventricular Arrhythmia: 0  Setting Changes: None  Patient has an appointment to see Dr. Hilton today.      Plan: Pt will send a remote transmission every 3 months and return to clinic in 1 year.  IDA Rodriguez RN     Dual lead pacemaker     I have reviewed and interpreted the device interrogation, settings, programming and nurse's summary. The device is functioning within normal device parameters. I agree with the current findings, assessment and plan.    Assessment and Plan:  # DM II  # HTN Well controlled on Norvasc 10 mg daily.  # Nathen leg pain  It is likely to be caused by back issue rather than DM neuropathy.  I advised her to consult orthopedics.   # Advanced AV block s/p MDT dual chamber pacemaker placement on 9/5/2020.  # PAF  AF burden was <0.1% per pacemaker interrogation on 12/19/2022.  We will keep monitoring.  She has been doing well and her pacemaker has been functioning appropriately.  I advised her to continue the same medication.  I will see her back in a year.    I spent a total of 30  min today to review the records, see the patient, and complete the documents.          Please do not hesitate to contact me if you have any questions/concerns.     Sincerely,     Eileen Hilton MD      CC  Patient Care Team:  Nicholas Velazquez MD as PCP - General (Family Practice)  Marisela Campoverde RD as Diabetes Educator (Dietitian, Registered)  [place]

## 2022-12-19 NOTE — PATIENT INSTRUCTIONS
You were seen in the Electrophysiology Clinic today by: Dr Hilton    Plan:     Follow up Visit:  1 year with Dr Hilton and a device check    Device Follow up:   Remote transmission in 3 months        If you have further questions, please utilize "[employer] to contact us.     Your Care Team:    EP Cardiology   Telephone Number     Nurse Line  Flory Puckett, RN   Nelida Molina, AYDEN   [phone]     For scheduling appointments:   Chaitanya   [phone]   For procedure scheduling:    Sonia Gar     [phone]   For the Device Clinic (Pacemakers, ICDs, Loop Recorders)    During business hours: [phone]  After business hours:   [phone]- select option 4 and ask for job code [id number].       On-call cardiologist for after hours or on weekends:   [phone], option #4, and ask to speak to the on-call cardiologist.     Cardiovascular Clinic:   [address] [place] [address]. [place] [address]      As always, Thank you for trusting us with your health care needs!

## 2023-01-29 ENCOUNTER — HEALTH MAINTENANCE LETTER (OUTPATIENT)
Age: 83
End: 2023-01-29

## 2023-03-20 ENCOUNTER — ANCILLARY PROCEDURE (OUTPATIENT)
Dept: CARDIOLOGY | Facility: CLINIC | Age: 83
End: 2023-03-20
Attending: INTERNAL MEDICINE
Payer: MEDICARE

## 2023-03-20 DIAGNOSIS — R00.1 BRADYCARDIA: ICD-10-CM

## 2023-03-20 PROCEDURE — 93294 REM INTERROG EVL PM/LDLS PM: CPT | Performed by: INTERNAL MEDICINE

## 2023-03-20 PROCEDURE — 93296 REM INTERROG EVL PM/IDS: CPT

## 2023-03-23 LAB
MDC_IDC_LEAD_IMPLANT_DT: NORMAL
MDC_IDC_LEAD_IMPLANT_DT: NORMAL
MDC_IDC_LEAD_LOCATION: NORMAL
MDC_IDC_LEAD_LOCATION: NORMAL
MDC_IDC_LEAD_LOCATION_DETAIL_1: NORMAL
MDC_IDC_LEAD_LOCATION_DETAIL_1: NORMAL
MDC_IDC_LEAD_MFG: NORMAL
MDC_IDC_LEAD_MFG: NORMAL
MDC_IDC_LEAD_MODEL: NORMAL
MDC_IDC_LEAD_MODEL: NORMAL
MDC_IDC_LEAD_POLARITY_TYPE: NORMAL
MDC_IDC_LEAD_POLARITY_TYPE: NORMAL
MDC_IDC_LEAD_SERIAL: NORMAL
MDC_IDC_LEAD_SERIAL: NORMAL
MDC_IDC_LEAD_SPECIAL_FUNCTION: NORMAL
MDC_IDC_LEAD_SPECIAL_FUNCTION: NORMAL
MDC_IDC_MSMT_BATTERY_DTM: NORMAL
MDC_IDC_MSMT_BATTERY_REMAINING_LONGEVITY: 125 MO
MDC_IDC_MSMT_BATTERY_RRT_TRIGGER: 2.62
MDC_IDC_MSMT_BATTERY_STATUS: NORMAL
MDC_IDC_MSMT_BATTERY_VOLTAGE: 3.01 V
MDC_IDC_MSMT_LEADCHNL_RA_IMPEDANCE_VALUE: 513 OHM
MDC_IDC_MSMT_LEADCHNL_RA_IMPEDANCE_VALUE: 532 OHM
MDC_IDC_MSMT_LEADCHNL_RA_PACING_THRESHOLD_AMPLITUDE: 0.5 V
MDC_IDC_MSMT_LEADCHNL_RA_PACING_THRESHOLD_PULSEWIDTH: 0.4 MS
MDC_IDC_MSMT_LEADCHNL_RA_SENSING_INTR_AMPL: 3.38 MV
MDC_IDC_MSMT_LEADCHNL_RV_IMPEDANCE_VALUE: 304 OHM
MDC_IDC_MSMT_LEADCHNL_RV_IMPEDANCE_VALUE: 418 OHM
MDC_IDC_MSMT_LEADCHNL_RV_PACING_THRESHOLD_AMPLITUDE: 1 V
MDC_IDC_MSMT_LEADCHNL_RV_PACING_THRESHOLD_PULSEWIDTH: 0.4 MS
MDC_IDC_MSMT_LEADCHNL_RV_SENSING_INTR_AMPL: 25.75 MV
MDC_IDC_PG_IMPLANT_DTM: NORMAL
MDC_IDC_PG_MFG: NORMAL
MDC_IDC_PG_MODEL: NORMAL
MDC_IDC_PG_SERIAL: NORMAL
MDC_IDC_PG_TYPE: NORMAL
MDC_IDC_SESS_CLINIC_NAME: NORMAL
MDC_IDC_SESS_DTM: NORMAL
MDC_IDC_SESS_TYPE: NORMAL
MDC_IDC_SET_BRADY_AT_MODE_SWITCH_RATE: 171 {BEATS}/MIN
MDC_IDC_SET_BRADY_HYSTRATE: NORMAL
MDC_IDC_SET_BRADY_LOWRATE: 60 {BEATS}/MIN
MDC_IDC_SET_BRADY_MAX_SENSOR_RATE: 120 {BEATS}/MIN
MDC_IDC_SET_BRADY_MAX_TRACKING_RATE: 120 {BEATS}/MIN
MDC_IDC_SET_BRADY_MODE: NORMAL
MDC_IDC_SET_BRADY_PAV_DELAY_LOW: 220 MS
MDC_IDC_SET_BRADY_SAV_DELAY_LOW: 200 MS
MDC_IDC_SET_LEADCHNL_RA_PACING_AMPLITUDE: 1.5 V
MDC_IDC_SET_LEADCHNL_RA_PACING_ANODE_ELECTRODE_1: NORMAL
MDC_IDC_SET_LEADCHNL_RA_PACING_ANODE_LOCATION_1: NORMAL
MDC_IDC_SET_LEADCHNL_RA_PACING_CAPTURE_MODE: NORMAL
MDC_IDC_SET_LEADCHNL_RA_PACING_CATHODE_ELECTRODE_1: NORMAL
MDC_IDC_SET_LEADCHNL_RA_PACING_CATHODE_LOCATION_1: NORMAL
MDC_IDC_SET_LEADCHNL_RA_PACING_POLARITY: NORMAL
MDC_IDC_SET_LEADCHNL_RA_PACING_PULSEWIDTH: 0.4 MS
MDC_IDC_SET_LEADCHNL_RA_SENSING_ANODE_ELECTRODE_1: NORMAL
MDC_IDC_SET_LEADCHNL_RA_SENSING_ANODE_LOCATION_1: NORMAL
MDC_IDC_SET_LEADCHNL_RA_SENSING_CATHODE_ELECTRODE_1: NORMAL
MDC_IDC_SET_LEADCHNL_RA_SENSING_CATHODE_LOCATION_1: NORMAL
MDC_IDC_SET_LEADCHNL_RA_SENSING_POLARITY: NORMAL
MDC_IDC_SET_LEADCHNL_RA_SENSING_SENSITIVITY: 0.3 MV
MDC_IDC_SET_LEADCHNL_RV_PACING_AMPLITUDE: 2 V
MDC_IDC_SET_LEADCHNL_RV_PACING_ANODE_ELECTRODE_1: NORMAL
MDC_IDC_SET_LEADCHNL_RV_PACING_ANODE_LOCATION_1: NORMAL
MDC_IDC_SET_LEADCHNL_RV_PACING_CAPTURE_MODE: NORMAL
MDC_IDC_SET_LEADCHNL_RV_PACING_CATHODE_ELECTRODE_1: NORMAL
MDC_IDC_SET_LEADCHNL_RV_PACING_CATHODE_LOCATION_1: NORMAL
MDC_IDC_SET_LEADCHNL_RV_PACING_POLARITY: NORMAL
MDC_IDC_SET_LEADCHNL_RV_PACING_PULSEWIDTH: 0.4 MS
MDC_IDC_SET_LEADCHNL_RV_SENSING_ANODE_ELECTRODE_1: NORMAL
MDC_IDC_SET_LEADCHNL_RV_SENSING_ANODE_LOCATION_1: NORMAL
MDC_IDC_SET_LEADCHNL_RV_SENSING_CATHODE_ELECTRODE_1: NORMAL
MDC_IDC_SET_LEADCHNL_RV_SENSING_CATHODE_LOCATION_1: NORMAL
MDC_IDC_SET_LEADCHNL_RV_SENSING_POLARITY: NORMAL
MDC_IDC_SET_LEADCHNL_RV_SENSING_SENSITIVITY: 0.9 MV
MDC_IDC_SET_ZONE_DETECTION_INTERVAL: 350 MS
MDC_IDC_SET_ZONE_DETECTION_INTERVAL: 400 MS
MDC_IDC_SET_ZONE_TYPE: NORMAL
MDC_IDC_STAT_AT_BURDEN_PERCENT: 0.1 %
MDC_IDC_STAT_AT_DTM_END: NORMAL
MDC_IDC_STAT_AT_DTM_START: NORMAL
MDC_IDC_STAT_BRADY_AP_VP_PERCENT: 25.75 %
MDC_IDC_STAT_BRADY_AP_VS_PERCENT: 0 %
MDC_IDC_STAT_BRADY_AS_VP_PERCENT: 74.23 %
MDC_IDC_STAT_BRADY_AS_VS_PERCENT: 0.02 %
MDC_IDC_STAT_BRADY_DTM_END: NORMAL
MDC_IDC_STAT_BRADY_DTM_START: NORMAL
MDC_IDC_STAT_BRADY_RA_PERCENT_PACED: 25.74 %
MDC_IDC_STAT_BRADY_RV_PERCENT_PACED: 99.98 %
MDC_IDC_STAT_EPISODE_RECENT_COUNT: 0
MDC_IDC_STAT_EPISODE_RECENT_COUNT_DTM_END: NORMAL
MDC_IDC_STAT_EPISODE_RECENT_COUNT_DTM_START: NORMAL
MDC_IDC_STAT_EPISODE_TOTAL_COUNT: 0
MDC_IDC_STAT_EPISODE_TOTAL_COUNT: 1
MDC_IDC_STAT_EPISODE_TOTAL_COUNT: 264
MDC_IDC_STAT_EPISODE_TOTAL_COUNT_DTM_END: NORMAL
MDC_IDC_STAT_EPISODE_TOTAL_COUNT_DTM_START: NORMAL
MDC_IDC_STAT_EPISODE_TYPE: NORMAL

## 2023-04-28 ENCOUNTER — OFFICE VISIT (OUTPATIENT)
Dept: FAMILY MEDICINE | Facility: CLINIC | Age: 83
End: 2023-04-28
Payer: MEDICARE

## 2023-04-28 VITALS
OXYGEN SATURATION: 99 % | TEMPERATURE: 97.3 F | BODY MASS INDEX: 26.53 KG/M2 | SYSTOLIC BLOOD PRESSURE: 194 MMHG | HEART RATE: 97 BPM | WEIGHT: 136 LBS | RESPIRATION RATE: 15 BRPM | DIASTOLIC BLOOD PRESSURE: 94 MMHG

## 2023-04-28 DIAGNOSIS — I10 ESSENTIAL HYPERTENSION: ICD-10-CM

## 2023-04-28 DIAGNOSIS — E11.59 TYPE 2 DIABETES MELLITUS WITH OTHER CIRCULATORY COMPLICATION, UNSPECIFIED WHETHER LONG TERM INSULIN USE (H): Primary | ICD-10-CM

## 2023-04-28 LAB
CREAT UR-MCNC: 70.5 MG/DL
HBA1C MFR BLD: 6.6 % (ref 0–5.6)
MICROALBUMIN UR-MCNC: 15.5 MG/L
MICROALBUMIN/CREAT UR: 21.99 MG/G CR (ref 0–25)

## 2023-04-28 PROCEDURE — 82570 ASSAY OF URINE CREATININE: CPT | Mod: ORL | Performed by: FAMILY MEDICINE

## 2023-04-28 PROCEDURE — 82310 ASSAY OF CALCIUM: CPT | Mod: ORL | Performed by: FAMILY MEDICINE

## 2023-04-28 RX ORDER — FLASH GLUCOSE SENSOR
KIT MISCELLANEOUS
Qty: 8 EACH | Refills: 3 | Status: SHIPPED | OUTPATIENT
Start: 2023-04-28 | End: 2023-11-17

## 2023-04-28 NOTE — PROGRESS NOTES
Assessment & Plan   Problem List Items Addressed This Visit        Endocrine    Type 2 diabetes mellitus with other circulatory complication, unspecified whether long term insulin use (H) - Primary    Relevant Medications    Continuous Blood Gluc Sensor (FREESTYLE ANURAG 14 DAY SENSOR) MISC    aspirin (ASA) 81 MG EC tablet    Other Relevant Orders    Hemoglobin A1c (Completed)    Albumin Random Urine Quantitative with Creat Ratio       Circulatory    Essential hypertension    Relevant Orders    Basic metabolic panel      Labs as ordered today. Concern for elevated pressures but there appears to be some uncertainty with whether or not Ashley is consistent taking her medications. Encouraged daily monitoring of BP and close watch of medication compliance. Should it appear that her pressures are elevated despite consistent medication I would then consider increasing BP medication dosage.     31 minutes spent on the date of the encounter doing chart review, history and exam, documentation and further activities as noted.    Nicholas Velazquez MD  [facility]    Subjective   ASHLEY is a 82 year old, presenting for the following health issues:  Follow Up (Check in from last visit )    HPI   # Type 2 Diabetes Mellitus  Lab Results   Component Value Date    A1C 7.2 10/28/2022    A1C 6.7 07/28/2022    A1C 6.6 01/27/2022    A1C 6.4 07/26/2021    A1C 6.8 10/07/2020     Lab Results   Component Value Date    CR 1.55 03/09/2022    CR 1.1 10/07/2020     Albumin Urine mg/g Cr (mg/g Cr)   Date Value   02/04/2022 5.19   10/07/2020 136.05 (H)     Creatinine Urine (mg/dL)   Date Value   10/07/2020 172     Creatinine Urine mg/dL (mg/dL)   Date Value   02/04/2022 154     - Current Regimen: diet   - Missed doses: N/A  - Self monitoring blood gluose?: CGM  - Hypoglycemic episodes?: no    Wt Readings from Last 5 Encounters:   04/28/23 61.7 kg (136 lb)   12/19/22 63.8 kg (140 lb 9.6 oz)   10/28/22 62.1 kg (137 lb)   07/28/22 62.1 kg (137 lb)    03/28/22 60.8 kg (134 lb)   - Exercise: minimal  - Diet: consistent  - Last eye exam: annual  - Last foot exam: today  - Last dental exam: semi-annual    Recent Labs   Lab Test 01/27/22  1436 10/07/20  1128   CHOL 174 203.0*   HDL 56 53.0   LDL 74 110.0   TRIG 222* 198.0*   CHOLHDLRATIO  --  3.8   - History of ASCVD?: yes  - On ASA and statin?: statin yes, restarted ASA 81 today    # Hypertension   BP Readings from Last 5 Encounters:   04/28/23 (!) 194/94   12/19/22 (!) 173/91   10/28/22 (!) 151/83   07/28/22 (!) 167/76   03/28/22 (!) 149/76     Creatinine   Date Value Ref Range Status   03/09/2022 1.55 (H) 0.52 - 1.04 mg/dL Final   01/27/2022 1.89 (H) 0.52 - 1.04 mg/dL Final   10/07/2020 1.1 0.6 - 1.3 mg/dL Final     Sodium   Date Value Ref Range Status   03/09/2022 138 133 - 144 mmol/L Final   10/07/2020 142.0 137.3 - 146.3 mmol/L Final     Potassium   Date Value Ref Range Status   03/09/2022 4.5 3.4 - 5.3 mmol/L Final   10/07/2020 4.3 3.4 - 5.3 mmol/L Final     - Current Regimen:     - lisinopril-hydrochlorothiazide 10-12.5mg daily  - Missed doses?: uncertain  - Headache, dizziness, blurry vision, chest pain, dyspnea, orthopnea, PND, lower extremity edema?: no        Review of Systems   Constitutional, HEENT, cardiovascular, pulmonary, gi and gu systems are negative, except as otherwise noted.      Objective    BP (!) 186/92 (BP Location: Right arm, Patient Position: Sitting, Cuff Size: Adult Regular)   Pulse 97   Temp 97.3  F (36.3  C) (Skin)   Resp 15   Wt 61.7 kg (136 lb)   SpO2 99%   BMI 26.53 kg/m    Body mass index is 26.53 kg/m .  Physical Exam   GENERAL: healthy, alert and no distress  NECK: no adenopathy, no asymmetry, masses, or scars and thyroid normal to palpation  RESP: lungs clear to auscultation - no rales, rhonchi or wheezes  CV: regular rate and rhythm, normal S1 S2, no S3 or S4, no murmur, click or rub, no peripheral edema and peripheral pulses strong  ABDOMEN: soft, nontender, no  hepatosplenomegaly, no masses and bowel sounds normal  MS: no gross musculoskeletal defects noted, no edema

## 2023-04-28 NOTE — NURSING NOTE
82 year old  Chief Complaint   Patient presents with     Follow Up     Check in from last visit        Blood pressure (!) 186/92, pulse 97, temperature 97.3  F (36.3  C), temperature source Skin, resp. rate 15, weight 61.7 kg (136 lb), SpO2 99 %. Body mass index is 26.53 kg/m .  Patient Active Problem List   Diagnosis     Type 2 diabetes mellitus with other circulatory complication, unspecified whether long term insulin use (H)     Bradycardia     Cardiac pacemaker in situ     Essential hypertension       Wt Readings from Last 2 Encounters:   04/28/23 61.7 kg (136 lb)   12/19/22 63.8 kg (140 lb 9.6 oz)     BP Readings from Last 3 Encounters:   04/28/23 (!) 186/92   12/19/22 (!) 173/91   10/28/22 (!) 151/83         Current Outpatient Medications   Medication     Continuous Blood Gluc  (FREESTYLE ANURAG 14 DAY READER) STEVIE     Continuous Blood Gluc Sensor (Enders FundSTYLE ANURAG 14 DAY SENSOR) MISC     lisinopril-hydrochlorothiazide (ZESTORETIC) 10-12.5 MG tablet     Multiple Vitamins-Minerals (MULTIVITAMIN ADULT PO)     rosuvastatin (CRESTOR) 10 MG tablet     No current facility-administered medications for this visit.       Social History     Tobacco Use     Smoking status: Never     Smokeless tobacco: Never   Substance Use Topics     Alcohol use: Never       Health Maintenance Due   Topic Date Due     ADVANCE CARE PLANNING  Never done     DTAP/TDAP/TD IMMUNIZATION (1 - Tdap) Never done     ZOSTER IMMUNIZATION (1 of 2) Never done     MEDICARE ANNUAL WELLNESS VISIT  Never done     Pneumococcal Vaccine: 65+ Years (2 - PCV) 10/07/2021     LIPID  01/27/2023     FALL RISK ASSESSMENT  01/27/2023     A1C  01/28/2023     MICROALBUMIN  02/04/2023     BMP  03/09/2023       No results found for: PAP      April 28, 2023 3:17 PM

## 2023-04-29 LAB
ANION GAP SERPL CALCULATED.3IONS-SCNC: 16 MMOL/L (ref 7–15)
BUN SERPL-MCNC: 26.6 MG/DL (ref 8–23)
CALCIUM SERPL-MCNC: 10 MG/DL (ref 8.8–10.2)
CHLORIDE SERPL-SCNC: 99 MMOL/L (ref 98–107)
CREAT SERPL-MCNC: 1.57 MG/DL (ref 0.51–0.95)
DEPRECATED HCO3 PLAS-SCNC: 22 MMOL/L (ref 22–29)
GFR SERPL CREATININE-BSD FRML MDRD: 33 ML/MIN/1.73M2
GLUCOSE SERPL-MCNC: 138 MG/DL (ref 70–99)
POTASSIUM SERPL-SCNC: 4.1 MMOL/L (ref 3.4–5.3)
SODIUM SERPL-SCNC: 137 MMOL/L (ref 136–145)

## 2023-05-08 ENCOUNTER — HEALTH MAINTENANCE LETTER (OUTPATIENT)
Age: 83
End: 2023-05-08

## 2023-06-26 ENCOUNTER — ANCILLARY PROCEDURE (OUTPATIENT)
Dept: CARDIOLOGY | Facility: CLINIC | Age: 83
End: 2023-06-26
Attending: INTERNAL MEDICINE
Payer: MEDICARE

## 2023-06-26 DIAGNOSIS — R00.1 BRADYCARDIA: ICD-10-CM

## 2023-06-26 PROCEDURE — 93294 REM INTERROG EVL PM/LDLS PM: CPT | Performed by: INTERNAL MEDICINE

## 2023-06-26 PROCEDURE — 93296 REM INTERROG EVL PM/IDS: CPT

## 2023-06-29 LAB
MDC_IDC_EPISODE_DTM: NORMAL
MDC_IDC_EPISODE_DURATION: 1 S
MDC_IDC_EPISODE_ID: 266
MDC_IDC_EPISODE_TYPE: NORMAL
MDC_IDC_LEAD_IMPLANT_DT: NORMAL
MDC_IDC_LEAD_IMPLANT_DT: NORMAL
MDC_IDC_LEAD_LOCATION: NORMAL
MDC_IDC_LEAD_LOCATION: NORMAL
MDC_IDC_LEAD_LOCATION_DETAIL_1: NORMAL
MDC_IDC_LEAD_LOCATION_DETAIL_1: NORMAL
MDC_IDC_LEAD_MFG: NORMAL
MDC_IDC_LEAD_MFG: NORMAL
MDC_IDC_LEAD_MODEL: NORMAL
MDC_IDC_LEAD_MODEL: NORMAL
MDC_IDC_LEAD_POLARITY_TYPE: NORMAL
MDC_IDC_LEAD_POLARITY_TYPE: NORMAL
MDC_IDC_LEAD_SERIAL: NORMAL
MDC_IDC_LEAD_SERIAL: NORMAL
MDC_IDC_LEAD_SPECIAL_FUNCTION: NORMAL
MDC_IDC_LEAD_SPECIAL_FUNCTION: NORMAL
MDC_IDC_MSMT_BATTERY_DTM: NORMAL
MDC_IDC_MSMT_BATTERY_REMAINING_LONGEVITY: 122 MO
MDC_IDC_MSMT_BATTERY_RRT_TRIGGER: 2.62
MDC_IDC_MSMT_BATTERY_STATUS: NORMAL
MDC_IDC_MSMT_BATTERY_VOLTAGE: 3.01 V
MDC_IDC_MSMT_LEADCHNL_RA_IMPEDANCE_VALUE: 513 OHM
MDC_IDC_MSMT_LEADCHNL_RA_IMPEDANCE_VALUE: 551 OHM
MDC_IDC_MSMT_LEADCHNL_RA_PACING_THRESHOLD_AMPLITUDE: 0.5 V
MDC_IDC_MSMT_LEADCHNL_RA_PACING_THRESHOLD_PULSEWIDTH: 0.4 MS
MDC_IDC_MSMT_LEADCHNL_RA_SENSING_INTR_AMPL: 4.12 MV
MDC_IDC_MSMT_LEADCHNL_RA_SENSING_INTR_AMPL: 4.12 MV
MDC_IDC_MSMT_LEADCHNL_RV_IMPEDANCE_VALUE: 323 OHM
MDC_IDC_MSMT_LEADCHNL_RV_IMPEDANCE_VALUE: 437 OHM
MDC_IDC_MSMT_LEADCHNL_RV_PACING_THRESHOLD_AMPLITUDE: 1 V
MDC_IDC_MSMT_LEADCHNL_RV_PACING_THRESHOLD_PULSEWIDTH: 0.4 MS
MDC_IDC_MSMT_LEADCHNL_RV_SENSING_INTR_AMPL: 3.88 MV
MDC_IDC_MSMT_LEADCHNL_RV_SENSING_INTR_AMPL: 3.88 MV
MDC_IDC_PG_IMPLANT_DTM: NORMAL
MDC_IDC_PG_MFG: NORMAL
MDC_IDC_PG_MODEL: NORMAL
MDC_IDC_PG_SERIAL: NORMAL
MDC_IDC_PG_TYPE: NORMAL
MDC_IDC_SESS_CLINIC_NAME: NORMAL
MDC_IDC_SESS_DTM: NORMAL
MDC_IDC_SESS_TYPE: NORMAL
MDC_IDC_SET_BRADY_AT_MODE_SWITCH_RATE: 171 {BEATS}/MIN
MDC_IDC_SET_BRADY_HYSTRATE: NORMAL
MDC_IDC_SET_BRADY_LOWRATE: 60 {BEATS}/MIN
MDC_IDC_SET_BRADY_MAX_SENSOR_RATE: 120 {BEATS}/MIN
MDC_IDC_SET_BRADY_MAX_TRACKING_RATE: 120 {BEATS}/MIN
MDC_IDC_SET_BRADY_MODE: NORMAL
MDC_IDC_SET_BRADY_PAV_DELAY_LOW: 220 MS
MDC_IDC_SET_BRADY_SAV_DELAY_LOW: 200 MS
MDC_IDC_SET_LEADCHNL_RA_PACING_AMPLITUDE: 1.5 V
MDC_IDC_SET_LEADCHNL_RA_PACING_ANODE_ELECTRODE_1: NORMAL
MDC_IDC_SET_LEADCHNL_RA_PACING_ANODE_LOCATION_1: NORMAL
MDC_IDC_SET_LEADCHNL_RA_PACING_CAPTURE_MODE: NORMAL
MDC_IDC_SET_LEADCHNL_RA_PACING_CATHODE_ELECTRODE_1: NORMAL
MDC_IDC_SET_LEADCHNL_RA_PACING_CATHODE_LOCATION_1: NORMAL
MDC_IDC_SET_LEADCHNL_RA_PACING_POLARITY: NORMAL
MDC_IDC_SET_LEADCHNL_RA_PACING_PULSEWIDTH: 0.4 MS
MDC_IDC_SET_LEADCHNL_RA_SENSING_ANODE_ELECTRODE_1: NORMAL
MDC_IDC_SET_LEADCHNL_RA_SENSING_ANODE_LOCATION_1: NORMAL
MDC_IDC_SET_LEADCHNL_RA_SENSING_CATHODE_ELECTRODE_1: NORMAL
MDC_IDC_SET_LEADCHNL_RA_SENSING_CATHODE_LOCATION_1: NORMAL
MDC_IDC_SET_LEADCHNL_RA_SENSING_POLARITY: NORMAL
MDC_IDC_SET_LEADCHNL_RA_SENSING_SENSITIVITY: 0.3 MV
MDC_IDC_SET_LEADCHNL_RV_PACING_AMPLITUDE: 2 V
MDC_IDC_SET_LEADCHNL_RV_PACING_ANODE_ELECTRODE_1: NORMAL
MDC_IDC_SET_LEADCHNL_RV_PACING_ANODE_LOCATION_1: NORMAL
MDC_IDC_SET_LEADCHNL_RV_PACING_CAPTURE_MODE: NORMAL
MDC_IDC_SET_LEADCHNL_RV_PACING_CATHODE_ELECTRODE_1: NORMAL
MDC_IDC_SET_LEADCHNL_RV_PACING_CATHODE_LOCATION_1: NORMAL
MDC_IDC_SET_LEADCHNL_RV_PACING_POLARITY: NORMAL
MDC_IDC_SET_LEADCHNL_RV_PACING_PULSEWIDTH: 0.4 MS
MDC_IDC_SET_LEADCHNL_RV_SENSING_ANODE_ELECTRODE_1: NORMAL
MDC_IDC_SET_LEADCHNL_RV_SENSING_ANODE_LOCATION_1: NORMAL
MDC_IDC_SET_LEADCHNL_RV_SENSING_CATHODE_ELECTRODE_1: NORMAL
MDC_IDC_SET_LEADCHNL_RV_SENSING_CATHODE_LOCATION_1: NORMAL
MDC_IDC_SET_LEADCHNL_RV_SENSING_POLARITY: NORMAL
MDC_IDC_SET_LEADCHNL_RV_SENSING_SENSITIVITY: 0.9 MV
MDC_IDC_SET_ZONE_DETECTION_INTERVAL: 350 MS
MDC_IDC_SET_ZONE_DETECTION_INTERVAL: 400 MS
MDC_IDC_SET_ZONE_TYPE: NORMAL
MDC_IDC_STAT_AT_BURDEN_PERCENT: 0 %
MDC_IDC_STAT_AT_DTM_END: NORMAL
MDC_IDC_STAT_AT_DTM_START: NORMAL
MDC_IDC_STAT_BRADY_AP_VP_PERCENT: 28.37 %
MDC_IDC_STAT_BRADY_AP_VS_PERCENT: 0 %
MDC_IDC_STAT_BRADY_AS_VP_PERCENT: 71.61 %
MDC_IDC_STAT_BRADY_AS_VS_PERCENT: 0.02 %
MDC_IDC_STAT_BRADY_DTM_END: NORMAL
MDC_IDC_STAT_BRADY_DTM_START: NORMAL
MDC_IDC_STAT_BRADY_RA_PERCENT_PACED: 28.36 %
MDC_IDC_STAT_BRADY_RV_PERCENT_PACED: 99.98 %
MDC_IDC_STAT_EPISODE_RECENT_COUNT: 0
MDC_IDC_STAT_EPISODE_RECENT_COUNT: 1
MDC_IDC_STAT_EPISODE_RECENT_COUNT_DTM_END: NORMAL
MDC_IDC_STAT_EPISODE_RECENT_COUNT_DTM_START: NORMAL
MDC_IDC_STAT_EPISODE_TOTAL_COUNT: 0
MDC_IDC_STAT_EPISODE_TOTAL_COUNT: 2
MDC_IDC_STAT_EPISODE_TOTAL_COUNT: 264
MDC_IDC_STAT_EPISODE_TOTAL_COUNT_DTM_END: NORMAL
MDC_IDC_STAT_EPISODE_TOTAL_COUNT_DTM_START: NORMAL
MDC_IDC_STAT_EPISODE_TYPE: NORMAL

## 2023-07-20 ENCOUNTER — TRANSFERRED RECORDS (OUTPATIENT)
Dept: MULTI SPECIALTY CLINIC | Facility: CLINIC | Age: 83
End: 2023-07-20

## 2023-07-20 LAB — RETINOPATHY: NORMAL

## 2023-08-01 NOTE — PATIENT INSTRUCTIONS
You were seen in the Electrophysiology Clinic today by: Dr Hilton    Plan:       Follow up visit:    Dr Hilton in 6 months with device check prior          Your Care Team:  EP Cardiology   Telephone Number     Nurse Line [phone]     For scheduling appts or procedures:    Sonia Gar   [phone]   For the Device Clinic (Pacemakers, ICDs, Loop Recorders)    During business hours: [phone]  After business hours:   [phone]- select option 4 and ask for job code [id number].     On-call cardiologist for after hours or on weekends: [phone], option #4, and ask to speak to the on-call cardiologist.     Cardiovascular Clinic:   [address] [facility] [place] [address]      As always, Thank you for trusting us with your health care needs!      Labs/Imaging Studies/Medications

## 2023-08-05 ENCOUNTER — HEALTH MAINTENANCE LETTER (OUTPATIENT)
Age: 83
End: 2023-08-05

## 2023-09-28 ENCOUNTER — ANCILLARY PROCEDURE (OUTPATIENT)
Dept: CARDIOLOGY | Facility: CLINIC | Age: 83
End: 2023-09-28
Attending: INTERNAL MEDICINE
Payer: MEDICARE

## 2023-09-28 DIAGNOSIS — I44.30 ATRIOVENTRICULAR BLOCK: ICD-10-CM

## 2023-09-28 PROCEDURE — 93296 REM INTERROG EVL PM/IDS: CPT

## 2023-09-28 PROCEDURE — 93294 REM INTERROG EVL PM/LDLS PM: CPT | Performed by: INTERNAL MEDICINE

## 2023-10-25 DIAGNOSIS — E11.59 TYPE 2 DIABETES MELLITUS WITH OTHER CIRCULATORY COMPLICATION, UNSPECIFIED WHETHER LONG TERM INSULIN USE (H): ICD-10-CM

## 2023-10-25 DIAGNOSIS — I10 ESSENTIAL HYPERTENSION: ICD-10-CM

## 2023-10-26 RX ORDER — LISINOPRIL/HYDROCHLOROTHIAZIDE 10-12.5 MG
1 TABLET ORAL DAILY
Qty: 90 TABLET | Refills: 0 | Status: SHIPPED | OUTPATIENT
Start: 2023-10-26 | End: 2024-01-18

## 2023-10-26 RX ORDER — ROSUVASTATIN CALCIUM 10 MG/1
10 TABLET, COATED ORAL DAILY
Qty: 90 TABLET | Refills: 0 | Status: SHIPPED | OUTPATIENT
Start: 2023-10-26 | End: 2024-01-18

## 2023-10-26 NOTE — TELEPHONE ENCOUNTER
Rosuvastatin (Crestor) 10 mg + Lisinopril-hydrochlorothiazide (Zestoretic) 10-12.5 mg     Last Office Visit: 4/28/23  [facility] Appointments: None  Medication last refilled:     10/28/22 #90 with 3 refill(s) - Zestoretic  10/28/22 #90 with 3 refill(s) - Rosuvastatin    Vital Signs Systolic Diastolic   4/28/23 186 92   10/28/22 151 83   1/27/22 138 78     Required labs per protocol:    LAB REF RANGE 1/27/22 4/28/23   Creatinine 0.67-1.17 mg/dL 1.89 High 1.57 High   Potassium 3.4-5.3 mmol/L 4.5 4.1   Sodium 137.3-146.3 mmol/L 140 137   LDL < 100 mg/dL 74 --       new test company message sent to patient to call and schedule appointment.    Cheryl Richey, LORN, RN, CCM

## 2023-11-01 ENCOUNTER — TELEPHONE (OUTPATIENT)
Dept: CARDIOLOGY | Facility: CLINIC | Age: 83
End: 2023-11-01
Payer: MEDICARE

## 2023-11-01 NOTE — TELEPHONE ENCOUNTER
Left Voicemail (1st Attempt) and Sent Mychart (1st Attempt) for the patient to call back and schedule the following:    Appointment type: Return EP  Provider: EP provider (MD preferred, NELLY ok)  Return date: December 2023 or first available  Specialty phone number: [phone] option 1  Additional appointment(s) needed: In clinic device check prior  Additional Notes: N/A    Micha Reveles on 11/1/2023 at 10:49 AM

## 2023-11-17 ENCOUNTER — OFFICE VISIT (OUTPATIENT)
Dept: FAMILY MEDICINE | Facility: CLINIC | Age: 83
End: 2023-11-17
Payer: MEDICARE

## 2023-11-17 VITALS
WEIGHT: 128.5 LBS | BODY MASS INDEX: 25.06 KG/M2 | OXYGEN SATURATION: 99 % | SYSTOLIC BLOOD PRESSURE: 164 MMHG | DIASTOLIC BLOOD PRESSURE: 78 MMHG | HEART RATE: 80 BPM | TEMPERATURE: 97.3 F

## 2023-11-17 DIAGNOSIS — M79.672 PAIN IN BOTH FEET: ICD-10-CM

## 2023-11-17 DIAGNOSIS — R79.89 ELEVATED SERUM CREATININE: Primary | ICD-10-CM

## 2023-11-17 DIAGNOSIS — Z74.09 LIMITED MOBILITY: ICD-10-CM

## 2023-11-17 DIAGNOSIS — E11.59 TYPE 2 DIABETES MELLITUS WITH OTHER CIRCULATORY COMPLICATION, UNSPECIFIED WHETHER LONG TERM INSULIN USE (H): ICD-10-CM

## 2023-11-17 DIAGNOSIS — M79.671 PAIN IN BOTH FEET: ICD-10-CM

## 2023-11-17 RX ORDER — FLASH GLUCOSE SENSOR
KIT MISCELLANEOUS
Qty: 8 EACH | Refills: 3 | Status: SHIPPED | OUTPATIENT
Start: 2023-11-17 | End: 2023-11-27

## 2023-11-17 RX ORDER — FLASH GLUCOSE SENSOR
KIT MISCELLANEOUS
Qty: 8 EACH | Refills: 3 | Status: SHIPPED | OUTPATIENT
Start: 2023-11-17 | End: 2023-11-17

## 2023-11-17 NOTE — PROGRESS NOTES
Assessment & Plan   Problem List Items Addressed This Visit          Endocrine    Type 2 diabetes mellitus with other circulatory complication, unspecified whether long term insulin use (H)    Relevant Medications    Continuous Blood Gluc Sensor (FREESTYLE ANURAG 14 DAY SENSOR) MISC    Other Relevant Orders    Hemoglobin A1c     Other Visit Diagnoses       Elevated serum creatinine    -  Primary    Relevant Orders    Basic metabolic panel    Pain in both feet        Relevant Orders    Orthopedic  Referral    Limited mobility        Relevant Orders    Occupational Therapy Referral           OT referral for assessment of wheelchair.   Podiatry referral for further assessment of foot pain.   No change in diabetes management.  Concerns for elevated pressures in clinic today. Discussed possible increase in dosing but patient and son report home pressures wnls. Will continue to monitor.     Addendum of 3/8/24: Patient is currently using CGM    33 minutes spent on the date of the encounter doing chart review, history and exam, documentation and further activities as noted.      Nicholas Velazquez MD   [facility]    Agueda DIOR is a 82 year old, presenting for the following health issues:  Diabetes (Check A1C, blood sugars have been high in the mornings) and Musculoskeletal Problem (Bilateral leg pain, mostly the RIGHT leg)      HPI   Bilateral foot pain  - for the past few years, feels like it is getting worse  - limiting movement and ability to perform ADLs  Wheelchair Documentation  1. The patient has mobility limitations that impairs their ability to participate in one or more mobility related activities: foot pain inhibits ability to walk for any significant length.  2. The patient's mobility limitations cannot be safely resolved by using a cane/walker:No  3. The patients home has adequate access to use a manual wheelchair:Yes  4. The use of a manual wheelchair on a regular basis will  improve the patients ability to participate in mobility related ADL's at home:Yes  5. The patient is willing to use a manual wheelchair at home:Yes  6. The patient has adequate upper body strength and the mental capability to safely use a manual wheelchair and/or has a caregiver that is able to assist: Yes  7. Does the patient have a lower extremity injury or edema?No    Reason for Type of Wheelchair  Patient weight: 128 lbs 8 oz  Light Weight Wheelchair: Patient is unable to self-propel a standard wheelchair in the home but can self propel a light weight wheelchair.    **Use of a manual wheelchair will significantly improve the patient's ability to participate in MRADLs and the patient will use it on a regular basis in the home. The patient has not expressed an unwillingness to use the manual wheelchair that is provided in the home.**    # Type 2 Diabetes Mellitus  Lab Results   Component Value Date    A1C 6.6 04/28/2023    A1C 7.2 10/28/2022    A1C 6.7 07/28/2022    A1C 6.6 01/27/2022    A1C 6.4 07/26/2021    A1C 6.8 10/07/2020     Lab Results   Component Value Date    CR 1.57 04/28/2023    CR 1.1 10/07/2020     Albumin Urine mg/g Cr (mg/g Cr)   Date Value   04/28/2023 21.99   02/04/2022 5.19   10/07/2020 136.05 (H)     Creatinine Urine (mg/dL)   Date Value   10/07/2020 172     Creatinine Urine mg/dL (mg/dL)   Date Value   04/28/2023 70.5   02/04/2022 154     - Current Regimen: Diet  - Missed doses: N/A  - Self monitoring blood gluose?: yes      Wt Readings from Last 5 Encounters:   11/17/23 58.3 kg (128 lb 8 oz)   04/28/23 61.7 kg (136 lb)   12/19/22 63.8 kg (140 lb 9.6 oz)   10/28/22 62.1 kg (137 lb)   07/28/22 62.1 kg (137 lb)       Recent Labs   Lab Test 01/27/22  1436 10/07/20  1128   CHOL 174 203.0*   HDL 56 53.0   LDL 74 110.0   TRIG 222* 198.0*   CHOLHDLRATIO  --  3.8     - On ASA and statin?: yes    # Hypertension   BP Readings from Last 5 Encounters:   11/17/23 (!) 164/78   04/28/23 (!) 194/94   12/19/22  (!) 173/91   10/28/22 (!) 151/83   07/28/22 (!) 167/76     Creatinine   Date Value Ref Range Status   04/28/2023 1.57 (H) 0.51 - 0.95 mg/dL Final   03/09/2022 1.55 (H) 0.52 - 1.04 mg/dL Final   10/07/2020 1.1 0.6 - 1.3 mg/dL Final     Sodium   Date Value Ref Range Status   04/28/2023 137 136 - 145 mmol/L Final   10/07/2020 142.0 137.3 - 146.3 mmol/L Final     Potassium   Date Value Ref Range Status   04/28/2023 4.1 3.4 - 5.3 mmol/L Final   03/09/2022 4.5 3.4 - 5.3 mmol/L Final   10/07/2020 4.3 3.4 - 5.3 mmol/L Final     - Current Regimen: lisinopril-hydrochlorothiazide 10-12.5  - Missed doses?: no  - Headache, dizziness, blurry vision, chest pain, dyspnea, orthopnea, PND, lower extremity edema?: no        Review of Systems   Constitutional, HEENT, cardiovascular, pulmonary, gi and gu systems are negative, except as otherwise noted.      Objective    BP (!) 164/78 (BP Location: Right arm, Patient Position: Sitting, Cuff Size: Adult Regular)   Pulse 80   Temp 97.3  F (36.3  C) (Skin)   Wt 58.3 kg (128 lb 8 oz)   SpO2 99%   BMI 25.06 kg/m    Body mass index is 25.06 kg/m .  Physical Exam   GENERAL: healthy, alert and no distress  NECK: no adenopathy, no asymmetry, masses, or scars and thyroid normal to palpation  RESP: lungs clear to auscultation - no rales, rhonchi or wheezes  CV: regular rate and rhythm, normal S1 S2, no S3 or S4, no murmur, click or rub, no peripheral edema and peripheral pulses strong  ABDOMEN: soft, nontender, no hepatosplenomegaly, no masses and bowel sounds normal  MS: no gross musculoskeletal defects noted, no edema

## 2023-11-17 NOTE — NURSING NOTE
"OVI  82 year old    Chief Complaint   Patient presents with    Diabetes     Check A1C, blood sugars have been high in the mornings    Musculoskeletal Problem     Bilateral leg pain, mostly the RIGHT leg            Blood pressure (!) 164/78, pulse 80, temperature 97.3  F (36.3  C), temperature source Skin, weight 58.3 kg (128 lb 8 oz), SpO2 99%. Body mass index is 25.06 kg/m .    Patient Active Problem List   Diagnosis    Type 2 diabetes mellitus with other circulatory complication, unspecified whether long term insulin use (H)    Bradycardia    Cardiac pacemaker in situ    Essential hypertension              Wt Readings from Last 2 Encounters:   11/17/23 58.3 kg (128 lb 8 oz)   04/28/23 61.7 kg (136 lb)       BP Readings from Last 3 Encounters:   11/17/23 (!) 164/78   04/28/23 (!) 194/94   12/19/22 (!) 173/91                Current Outpatient Medications   Medication    Continuous Blood Gluc  (FREESTYLE ANURAG 14 DAY READER) STEVIE    Continuous Blood Gluc Sensor (FREESTYLE ANURAG 14 DAY SENSOR) MISC    lisinopril-hydrochlorothiazide (ZESTORETIC) 10-12.5 MG tablet    Multiple Vitamins-Minerals (MULTIVITAMIN ADULT PO)    rosuvastatin (CRESTOR) 10 MG tablet    aspirin (ASA) 81 MG EC tablet     No current facility-administered medications for this visit.              Social History     Tobacco Use    Smoking status: Never    Smokeless tobacco: Never   Substance Use Topics    Alcohol use: Never              Health Maintenance Due   Topic Date Due    ADVANCE CARE PLANNING  Never done    DTAP/TDAP/TD IMMUNIZATION (1 - Tdap) Never done    ZOSTER IMMUNIZATION (1 of 2) Never done    RSV VACCINE (Pregnancy & 60+) (1 - 1-dose 60+ series) Never done    MEDICARE ANNUAL WELLNESS VISIT  Never done    Pneumococcal Vaccine: 65+ Years (2 - PCV) 10/07/2021    LIPID  01/27/2023    FALL RISK ASSESSMENT  01/27/2023    A1C  07/28/2023    DIABETIC FOOT EXAM  07/28/2023    EYE EXAM  12/08/2023            No results found for: \"PAP\"     "       November 17, 2023 3:27 PM

## 2023-11-21 ENCOUNTER — LAB (OUTPATIENT)
Dept: LAB | Facility: CLINIC | Age: 83
End: 2023-11-21
Payer: MEDICARE

## 2023-11-21 LAB — HBA1C MFR BLD: 7.5 %

## 2023-11-21 PROCEDURE — 83036 HEMOGLOBIN GLYCOSYLATED A1C: CPT | Mod: ORL | Performed by: FAMILY MEDICINE

## 2023-11-21 PROCEDURE — 80048 BASIC METABOLIC PNL TOTAL CA: CPT | Mod: ORL | Performed by: FAMILY MEDICINE

## 2023-11-22 LAB
ANION GAP SERPL CALCULATED.3IONS-SCNC: 11 MMOL/L (ref 7–15)
BUN SERPL-MCNC: 27.9 MG/DL (ref 8–23)
CALCIUM SERPL-MCNC: 9.4 MG/DL (ref 8.8–10.2)
CHLORIDE SERPL-SCNC: 102 MMOL/L (ref 98–107)
CREAT SERPL-MCNC: 1.72 MG/DL (ref 0.51–0.95)
DEPRECATED HCO3 PLAS-SCNC: 24 MMOL/L (ref 22–29)
EGFRCR SERPLBLD CKD-EPI 2021: 29 ML/MIN/1.73M2
GLUCOSE SERPL-MCNC: 194 MG/DL (ref 70–99)
POTASSIUM SERPL-SCNC: 4 MMOL/L (ref 3.4–5.3)
SODIUM SERPL-SCNC: 137 MMOL/L (ref 135–145)

## 2023-11-27 ENCOUNTER — TELEPHONE (OUTPATIENT)
Dept: FAMILY MEDICINE | Facility: CLINIC | Age: 83
End: 2023-11-27

## 2023-11-27 DIAGNOSIS — E11.59 TYPE 2 DIABETES MELLITUS WITH OTHER CIRCULATORY COMPLICATION, UNSPECIFIED WHETHER LONG TERM INSULIN USE (H): ICD-10-CM

## 2023-11-27 RX ORDER — FLASH GLUCOSE SENSOR
KIT MISCELLANEOUS
Qty: 8 EACH | Refills: 3 | Status: SHIPPED | OUTPATIENT
Start: 2023-11-27 | End: 2024-05-14

## 2023-11-27 NOTE — TELEPHONE ENCOUNTER
Son called [facility] and they said they didn't receive script for freestyle sensors    Ashley down to one sensor    Roro

## 2023-11-27 NOTE — TELEPHONE ENCOUNTER
Sending Freestyle Bernice 14 day sensor order to [facility] again.   Pt's son reported it was never received by company.    Carmen Valdivia, LORN, RN  11/27/23, 4:48 PM

## 2024-01-03 ENCOUNTER — OFFICE VISIT (OUTPATIENT)
Dept: PODIATRY | Facility: CLINIC | Age: 84
End: 2024-01-03
Attending: FAMILY MEDICINE
Payer: MEDICARE

## 2024-01-03 VITALS — HEART RATE: 88 BPM | DIASTOLIC BLOOD PRESSURE: 88 MMHG | SYSTOLIC BLOOD PRESSURE: 160 MMHG

## 2024-01-03 DIAGNOSIS — M21.6X1 PRONATION OF BOTH FEET: Primary | ICD-10-CM

## 2024-01-03 DIAGNOSIS — M79.671 PAIN IN BOTH FEET: ICD-10-CM

## 2024-01-03 DIAGNOSIS — I73.9 PERIPHERAL ARTERIAL DISEASE (H): ICD-10-CM

## 2024-01-03 DIAGNOSIS — M79.672 PAIN IN BOTH FEET: ICD-10-CM

## 2024-01-03 DIAGNOSIS — E11.59 TYPE 2 DIABETES MELLITUS WITH OTHER CIRCULATORY COMPLICATION, UNSPECIFIED WHETHER LONG TERM INSULIN USE (H): ICD-10-CM

## 2024-01-03 DIAGNOSIS — M21.6X2 PRONATION OF BOTH FEET: Primary | ICD-10-CM

## 2024-01-03 DIAGNOSIS — L98.9 SKIN LESION: ICD-10-CM

## 2024-01-03 PROCEDURE — 99204 OFFICE O/P NEW MOD 45 MIN: CPT | Performed by: PODIATRIST

## 2024-01-03 NOTE — LETTER
1/3/2024         RE: Ashley Lynn  [address] [facility] [address]        Dear Colleague,    Thank you for referring your patient, Ashley Lynn, to the [facility]. Please see a copy of my visit note below.    Subjective:    Pt is seen today in consult from Dr. Velazquez for pain in feet.  This been a chronic ongoing problem.  Has had for at least a year.  Is recently gotten worse.  Has cracking right heel.  This is discolored and painful.  Aggravated by activity relieved by rest.  Denies erythema or drainage.  Also gets pain in legs when walking.  Her son states she cannot walk very far.  Her son also has noticed a skin lesion on her left foot.  He is wondering what this is not concerned about it.  Patient has diabetes.  Denies past history of ulcerations in her feet.  Denies blistering erythema or drainage anywhere.  Patient's son recently got her a good pair of shoes to wear and this is the first day she is wearing these.  She has never smoked.  No family history on file.  She will be starting occupational therapy soon.    ROS:  A 10-point review of systems was performed and is positive for that noted in the HPI and as seen below.  All other areas are negative.        No Known Allergies    Current Outpatient Medications   Medication Sig Dispense Refill     aspirin (ASA) 81 MG EC tablet Take 1 tablet (81 mg) by mouth daily (Patient not taking: Reported on 11/17/2023) 90 tablet 3     Continuous Blood Gluc  (FREESTYLE ANURAG 14 DAY READER) STEVIE Use to read blood sugars as per 's instructions. 1 each 0     Continuous Blood Gluc Sensor (FREESTYLE ANURAG 14 DAY SENSOR) MISC Change every 14 days. 8 each 3     lisinopril-hydrochlorothiazide (ZESTORETIC) 10-12.5 MG tablet Take 1 tablet by mouth daily 90 tablet 0     Multiple Vitamins-Minerals (MULTIVITAMIN ADULT PO)        rosuvastatin (CRESTOR) 10 MG tablet Take 1 tablet (10 mg) by mouth daily 90 tablet 0        Patient Active Problem List   Diagnosis     Type 2 diabetes mellitus with other circulatory complication, unspecified whether long term insulin use (H)     Bradycardia     Cardiac pacemaker in situ     Essential hypertension       Past Medical History:   Diagnosis Date     Cardiac pacemaker in situ 10/07/2020     Type 2 diabetes mellitus without complication, without long-term current use of insulin (H) 10/07/2020       Past Surgical History:   Procedure Laterality Date     IMPLANT PACEMAKER         No family history on file.    Social History     Tobacco Use     Smoking status: Never     Smokeless tobacco: Never   Substance Use Topics     Alcohol use: Never         Exam:    Vitals: BP (!) 160/88   Pulse 88   BMI: There is no height or weight on file to calculate BMI.  Height: Data Unavailable    Constitutional/ general:  Pt is in no apparent distress, appears well-nourished.  Cooperative with history and physical exam.  Patient seen with son today.    Psych:  The patient answered questions appropriately.  Normal affect.  Seems to have reasonable expectations, in terms of treatment.     Eyes:  Visual scanning/ tracking without deficit.     Ears:  Response to auditory stimuli is normal.  negative hearing aid devices.  Auricles in proper alignment.     Lymphatic:  Popliteal lymph nodes not enlarged.     Lungs:  Non labored breathing, non labored speech. No cough.  No audible wheezing. Even, quiet breathing.       Vascular:  positive pedal pulses bilaterally for both the DP arteries.  I cannot palpate the posterior tibial artery bilaterally.  CFT < 3 sec. slight ankle edema.  negative pedal hair growth.    Neuro:  Alert and oriented x 3. Coordinated gait.  Light touch sensation is intact to the L4, L5, S1 distributions. No obvious deficits.  No evidence of neurological-based weakness, spasticity, or contracture in the lower extremities.      Derm: Thin and shiny with no hair growth noted.  Fissured skin right  heel.  Dried blood noted.  Pain with palpation.  No erythema suggestive of infection.  Dark round skin lesion plantar medial distal right foot.    Musculoskeletal:    Lower extremity muscle strength is normal.  With weightbearing patient has bilateral pronation right greater than left.  Right increased internal tibial torsion noted.  Muscle strength equal and symmetrical bilaterally.  No pain on palpation feet today.  No pain with range of motion which is normal.  No calf pain today.                   Component  Ref Range & Units 1 mo ago  (11/21/23) 8 mo ago  (4/28/23) 1 yr ago  (10/28/22) 1 yr ago  (7/28/22) 1 yr ago  (1/27/22) 2 yr ago  (7/26/21) 3 yr ago  (10/7/20)    Hemoglobin A1C  <5.7 % 7.5 High  6.6 High  R, CM 7.2 High  R, CM 6.7 High  R, CM 6.6 High  R, CM 6.4 High  R, CM 6.8 High  R          A:  Diabetes mellitus   Right heel fissure  Bilateral pronation right greater than left  Leg pain with ambulation  Right foot skin lesion    P: Reviewed recent labs and primary care note.  Discussed cause of fissure on right heel.  Will place AmLactin on this twice a day until soft then as needed.  Explained to patient and son that right foot is more pronated than left.  Discussed mechanics causing this.  They have a good shoe.  They will try good over-the-counter arch support and made suggestions.  Should have good support at all times and pronated feet.  Discussed cannot palpate posterior tibial artery.  Explained how PAD could cause leg pain with ambulation.  Will order arterial ultrasound and ABIs.  Will call with results.  If abnormal will refer to vascular medicine.  For left foot skin lesion will refer to dermatology.  Will watch feet daily and keep blood sugars as well-controlled as possible.  RTC as needed.  Thank you for allowing me participate in the care of this patient.      Gunnar Roe DPM, FACFAS              Again, thank you for allowing me to participate in the care of your patient.         Sincerely,        Gunnar Roe, GLORIA

## 2024-01-03 NOTE — PATIENT INSTRUCTIONS
We wish you continued good healing. If you have any questions or concerns, please do not hesitate to contact us at  [phone]    [employer] (secure e-mail communication and access to your chart) to send a message or to make an appointment.    Please remember to call and schedule a follow up appointment if one was recommended at your earliest convenience.     PODIATRY CLINIC HOURS  TELEPHONE NUMBER    Dr. Gunnar BAUTISTAPDAGOBERTO FACFAS        Clinics:  [facility]   [place]  Tuesday 1PM-6PM  [place]  Wednesday 745AM-330PM  [place]  Monday 2nd,4th  830AM-4PM  Thursday/Friday 745AM-230PM     [place] APPOINTMENTS  [phone]    [place] APPOINTMENTS  [phone]          If you need a medication refill, please contact us you may need lab work and/or a follow up visit prior to your refill (i.e. Antifungal medications).  If MRI needed please call Imaging at [phone]   HOW DO I GET MY KNEE SCOOTER? Knee scooters can be picked up at ANY Medical Supply stores with your knee scooter Prescription.  OR  Bring your signed prescription to an [facility] showroom.   Set up an appointment for your custom Orthotics. Call any Orthotics locations call [phone]

## 2024-01-03 NOTE — PROGRESS NOTES
Subjective:    Pt is seen today in consult from Dr. Velazquez for pain in feet.  This been a chronic ongoing problem.  Has had for at least a year.  Is recently gotten worse.  Has cracking right heel.  This is discolored and painful.  Aggravated by activity relieved by rest.  Denies erythema or drainage.  Also gets pain in legs when walking.  Her son states she cannot walk very far.  Her son also has noticed a skin lesion on her left foot.  He is wondering what this is not concerned about it.  Patient has diabetes.  Denies past history of ulcerations in her feet.  Denies blistering erythema or drainage anywhere.  Patient's son recently got her a good pair of shoes to wear and this is the first day she is wearing these.  She has never smoked.  No family history on file.  She will be starting occupational therapy soon.    ROS:  A 10-point review of systems was performed and is positive for that noted in the HPI and as seen below.  All other areas are negative.        No Known Allergies    Current Outpatient Medications   Medication Sig Dispense Refill    aspirin (ASA) 81 MG EC tablet Take 1 tablet (81 mg) by mouth daily (Patient not taking: Reported on 11/17/2023) 90 tablet 3    Continuous Blood Gluc  (FREESTYLE ANURAG 14 DAY READER) STEVIE Use to read blood sugars as per 's instructions. 1 each 0    Continuous Blood Gluc Sensor (FREESTYLE ANURAG 14 DAY SENSOR) MISC Change every 14 days. 8 each 3    lisinopril-hydrochlorothiazide (ZESTORETIC) 10-12.5 MG tablet Take 1 tablet by mouth daily 90 tablet 0    Multiple Vitamins-Minerals (MULTIVITAMIN ADULT PO)       rosuvastatin (CRESTOR) 10 MG tablet Take 1 tablet (10 mg) by mouth daily 90 tablet 0       Patient Active Problem List   Diagnosis    Type 2 diabetes mellitus with other circulatory complication, unspecified whether long term insulin use (H)    Bradycardia    Cardiac pacemaker in situ    Essential hypertension       Past Medical History:   Diagnosis  Date    Cardiac pacemaker in situ 10/07/2020    Type 2 diabetes mellitus without complication, without long-term current use of insulin (H) 10/07/2020       Past Surgical History:   Procedure Laterality Date    IMPLANT PACEMAKER         No family history on file.    Social History     Tobacco Use    Smoking status: Never    Smokeless tobacco: Never   Substance Use Topics    Alcohol use: Never         Exam:    Vitals: BP (!) 160/88   Pulse 88   BMI: There is no height or weight on file to calculate BMI.  Height: Data Unavailable    Constitutional/ general:  Pt is in no apparent distress, appears well-nourished.  Cooperative with history and physical exam.  Patient seen with son today.    Psych:  The patient answered questions appropriately.  Normal affect.  Seems to have reasonable expectations, in terms of treatment.     Eyes:  Visual scanning/ tracking without deficit.     Ears:  Response to auditory stimuli is normal.  negative hearing aid devices.  Auricles in proper alignment.     Lymphatic:  Popliteal lymph nodes not enlarged.     Lungs:  Non labored breathing, non labored speech. No cough.  No audible wheezing. Even, quiet breathing.       Vascular:  positive pedal pulses bilaterally for both the DP arteries.  I cannot palpate the posterior tibial artery bilaterally.  CFT < 3 sec. slight ankle edema.  negative pedal hair growth.    Neuro:  Alert and oriented x 3. Coordinated gait.  Light touch sensation is intact to the L4, L5, S1 distributions. No obvious deficits.  No evidence of neurological-based weakness, spasticity, or contracture in the lower extremities.      Derm: Thin and shiny with no hair growth noted.  Fissured skin right heel.  Dried blood noted.  Pain with palpation.  No erythema suggestive of infection.  Dark round skin lesion plantar medial distal right foot.    Musculoskeletal:    Lower extremity muscle strength is normal.  With weightbearing patient has bilateral pronation right greater  than left.  Right increased internal tibial torsion noted.  Muscle strength equal and symmetrical bilaterally.  No pain on palpation feet today.  No pain with range of motion which is normal.  No calf pain today.                   Component  Ref Range & Units 1 mo ago  (11/21/23) 8 mo ago  (4/28/23) 1 yr ago  (10/28/22) 1 yr ago  (7/28/22) 1 yr ago  (1/27/22) 2 yr ago  (7/26/21) 3 yr ago  (10/7/20)    Hemoglobin A1C  <5.7 % 7.5 High  6.6 High  R, CM 7.2 High  R, CM 6.7 High  R, CM 6.6 High  R, CM 6.4 High  R, CM 6.8 High  R          A:  Diabetes mellitus   Right heel fissure  Bilateral pronation right greater than left  Leg pain with ambulation  Right foot skin lesion    P: Reviewed recent labs and primary care note.  Discussed cause of fissure on right heel.  Will place AmLactin on this twice a day until soft then as needed.  Explained to patient and son that right foot is more pronated than left.  Discussed mechanics causing this.  They have a good shoe.  They will try good over-the-counter arch support and made suggestions.  Should have good support at all times and pronated feet.  Discussed cannot palpate posterior tibial artery.  Explained how PAD could cause leg pain with ambulation.  Will order arterial ultrasound and ABIs.  Will call with results.  If abnormal will refer to vascular medicine.  For left foot skin lesion will refer to dermatology.  Will watch feet daily and keep blood sugars as well-controlled as possible.  RTC as needed.  Thank you for allowing me participate in the care of this patient.      Gunnar Roe, GLORIA, FACFAS

## 2024-01-11 ENCOUNTER — ANCILLARY PROCEDURE (OUTPATIENT)
Dept: ULTRASOUND IMAGING | Facility: CLINIC | Age: 84
End: 2024-01-11
Attending: PODIATRIST
Payer: MEDICARE

## 2024-01-11 DIAGNOSIS — I73.9 PERIPHERAL ARTERIAL DISEASE (H): ICD-10-CM

## 2024-01-11 PROCEDURE — 93925 LOWER EXTREMITY STUDY: CPT | Performed by: RADIOLOGY

## 2024-01-11 PROCEDURE — 93922 UPR/L XTREMITY ART 2 LEVELS: CPT | Performed by: RADIOLOGY

## 2024-01-18 DIAGNOSIS — E11.59 TYPE 2 DIABETES MELLITUS WITH OTHER CIRCULATORY COMPLICATION, UNSPECIFIED WHETHER LONG TERM INSULIN USE (H): ICD-10-CM

## 2024-01-18 DIAGNOSIS — I10 ESSENTIAL HYPERTENSION: ICD-10-CM

## 2024-01-19 RX ORDER — ROSUVASTATIN CALCIUM 10 MG/1
10 TABLET, COATED ORAL DAILY
Qty: 90 TABLET | Refills: 0 | Status: SHIPPED | OUTPATIENT
Start: 2024-01-19 | End: 2024-04-15

## 2024-01-19 RX ORDER — LISINOPRIL/HYDROCHLOROTHIAZIDE 10-12.5 MG
1 TABLET ORAL DAILY
Qty: 90 TABLET | Refills: 0 | Status: SHIPPED | OUTPATIENT
Start: 2024-01-19 | End: 2024-04-15

## 2024-01-19 NOTE — TELEPHONE ENCOUNTER
Lisinopril-hydrochlorothiazide (Zestoretic) 10-12.5 mg + Rosuvastatin (Crestor) 10 mg    Last Office Visit: 11/17/23  [facility] Appointments: None    Medication last refilled:   10/26/23 #90 with 0 refill(s) - Zestoretic  10/26/23 #90 with 0 refill(s) - Rosuvastatin     Vital Signs Systolic Diastolic   11/13/23 164 78   12/19/22 179 91   1/27/22 138 78     Required labs per protocol:    LAB REF RANGE 1/27/22 11/21/23   Creatinine 0.67-1.17 mg/dL 1.87 High 1.72 High   Potassium 3.4-5.3 mmol/L 4.5 4.0   Sodium 137.3-146.3 mmol/L 140 137   LDL <=100 mg/dL 74 --     Prescription approved per [facility] Refill Protocol.    Cheryl Richey BSN, RN, CCM

## 2024-01-22 ENCOUNTER — TELEPHONE (OUTPATIENT)
Dept: OCCUPATIONAL THERAPY | Facility: CLINIC | Age: 84
End: 2024-01-22
Payer: MEDICARE

## 2024-01-29 ENCOUNTER — TELEPHONE (OUTPATIENT)
Dept: OCCUPATIONAL THERAPY | Facility: CLINIC | Age: 84
End: 2024-01-29
Payer: MEDICARE

## 2024-01-30 ENCOUNTER — TELEPHONE (OUTPATIENT)
Dept: PODIATRY | Facility: CLINIC | Age: 84
End: 2024-01-30
Payer: MEDICARE

## 2024-01-30 DIAGNOSIS — I73.9 PVD (PERIPHERAL VASCULAR DISEASE) WITH CLAUDICATION (H): Primary | ICD-10-CM

## 2024-01-31 NOTE — TELEPHONE ENCOUNTER
Please review BRENNEN and US results from 1/11/24 and advise.     Bekah Lazcano RN   [facility] [place]

## 2024-02-01 ENCOUNTER — TELEPHONE (OUTPATIENT)
Dept: VASCULAR SURGERY | Facility: CLINIC | Age: 84
End: 2024-02-01
Payer: MEDICARE

## 2024-02-01 NOTE — TELEPHONE ENCOUNTER
Left Voicemail (1st Attempt) and Sent Mychart (1st Attempt)   for the patient to call back and schedule the following:Referral     Appointment type: New Pad Pt  Provider:   Return date: Next Available  Specialty phone number: [phone]  Additional appointment(s) needed: No  Additonal Notes: N/a  Chepe Mohan on 2/1/2024 at 2:02 PM

## 2024-02-02 LAB
MDC_IDC_EPISODE_DTM: NORMAL
MDC_IDC_EPISODE_DURATION: 10 S
MDC_IDC_EPISODE_ID: 267
MDC_IDC_EPISODE_TYPE: NORMAL
MDC_IDC_LEAD_IMPLANT_DT: NORMAL
MDC_IDC_LEAD_IMPLANT_DT: NORMAL
MDC_IDC_LEAD_LOCATION: NORMAL
MDC_IDC_LEAD_LOCATION: NORMAL
MDC_IDC_LEAD_LOCATION_DETAIL_1: NORMAL
MDC_IDC_LEAD_LOCATION_DETAIL_1: NORMAL
MDC_IDC_LEAD_MFG: NORMAL
MDC_IDC_LEAD_MFG: NORMAL
MDC_IDC_LEAD_MODEL: NORMAL
MDC_IDC_LEAD_MODEL: NORMAL
MDC_IDC_LEAD_POLARITY_TYPE: NORMAL
MDC_IDC_LEAD_POLARITY_TYPE: NORMAL
MDC_IDC_LEAD_SERIAL: NORMAL
MDC_IDC_LEAD_SERIAL: NORMAL
MDC_IDC_LEAD_SPECIAL_FUNCTION: NORMAL
MDC_IDC_LEAD_SPECIAL_FUNCTION: NORMAL
MDC_IDC_MSMT_BATTERY_DTM: NORMAL
MDC_IDC_MSMT_BATTERY_REMAINING_LONGEVITY: 109 MO
MDC_IDC_MSMT_BATTERY_RRT_TRIGGER: 2.62
MDC_IDC_MSMT_BATTERY_STATUS: NORMAL
MDC_IDC_MSMT_BATTERY_VOLTAGE: 3 V
MDC_IDC_MSMT_LEADCHNL_RA_IMPEDANCE_VALUE: 513 OHM
MDC_IDC_MSMT_LEADCHNL_RA_IMPEDANCE_VALUE: 551 OHM
MDC_IDC_MSMT_LEADCHNL_RA_PACING_THRESHOLD_AMPLITUDE: 0.5 V
MDC_IDC_MSMT_LEADCHNL_RA_PACING_THRESHOLD_PULSEWIDTH: 0.4 MS
MDC_IDC_MSMT_LEADCHNL_RA_SENSING_INTR_AMPL: 4.38 MV
MDC_IDC_MSMT_LEADCHNL_RA_SENSING_INTR_AMPL: 4.38 MV
MDC_IDC_MSMT_LEADCHNL_RV_IMPEDANCE_VALUE: 323 OHM
MDC_IDC_MSMT_LEADCHNL_RV_IMPEDANCE_VALUE: 437 OHM
MDC_IDC_MSMT_LEADCHNL_RV_PACING_THRESHOLD_AMPLITUDE: 1 V
MDC_IDC_MSMT_LEADCHNL_RV_PACING_THRESHOLD_PULSEWIDTH: 0.4 MS
MDC_IDC_MSMT_LEADCHNL_RV_SENSING_INTR_AMPL: 3.12 MV
MDC_IDC_MSMT_LEADCHNL_RV_SENSING_INTR_AMPL: 3.12 MV
MDC_IDC_PG_IMPLANT_DTM: NORMAL
MDC_IDC_PG_MFG: NORMAL
MDC_IDC_PG_MODEL: NORMAL
MDC_IDC_PG_SERIAL: NORMAL
MDC_IDC_PG_TYPE: NORMAL
MDC_IDC_SESS_CLINIC_NAME: NORMAL
MDC_IDC_SESS_DTM: NORMAL
MDC_IDC_SESS_TYPE: NORMAL
MDC_IDC_SET_BRADY_AT_MODE_SWITCH_RATE: 171 {BEATS}/MIN
MDC_IDC_SET_BRADY_HYSTRATE: NORMAL
MDC_IDC_SET_BRADY_LOWRATE: 60 {BEATS}/MIN
MDC_IDC_SET_BRADY_MAX_SENSOR_RATE: 120 {BEATS}/MIN
MDC_IDC_SET_BRADY_MAX_TRACKING_RATE: 120 {BEATS}/MIN
MDC_IDC_SET_BRADY_MODE: NORMAL
MDC_IDC_SET_BRADY_PAV_DELAY_LOW: 220 MS
MDC_IDC_SET_BRADY_SAV_DELAY_LOW: 200 MS
MDC_IDC_SET_LEADCHNL_RA_PACING_AMPLITUDE: 1.5 V
MDC_IDC_SET_LEADCHNL_RA_PACING_ANODE_ELECTRODE_1: NORMAL
MDC_IDC_SET_LEADCHNL_RA_PACING_ANODE_LOCATION_1: NORMAL
MDC_IDC_SET_LEADCHNL_RA_PACING_CAPTURE_MODE: NORMAL
MDC_IDC_SET_LEADCHNL_RA_PACING_CATHODE_ELECTRODE_1: NORMAL
MDC_IDC_SET_LEADCHNL_RA_PACING_CATHODE_LOCATION_1: NORMAL
MDC_IDC_SET_LEADCHNL_RA_PACING_POLARITY: NORMAL
MDC_IDC_SET_LEADCHNL_RA_PACING_PULSEWIDTH: 0.4 MS
MDC_IDC_SET_LEADCHNL_RA_SENSING_ANODE_ELECTRODE_1: NORMAL
MDC_IDC_SET_LEADCHNL_RA_SENSING_ANODE_LOCATION_1: NORMAL
MDC_IDC_SET_LEADCHNL_RA_SENSING_CATHODE_ELECTRODE_1: NORMAL
MDC_IDC_SET_LEADCHNL_RA_SENSING_CATHODE_LOCATION_1: NORMAL
MDC_IDC_SET_LEADCHNL_RA_SENSING_POLARITY: NORMAL
MDC_IDC_SET_LEADCHNL_RA_SENSING_SENSITIVITY: 0.3 MV
MDC_IDC_SET_LEADCHNL_RV_PACING_AMPLITUDE: 2.25 V
MDC_IDC_SET_LEADCHNL_RV_PACING_ANODE_ELECTRODE_1: NORMAL
MDC_IDC_SET_LEADCHNL_RV_PACING_ANODE_LOCATION_1: NORMAL
MDC_IDC_SET_LEADCHNL_RV_PACING_CAPTURE_MODE: NORMAL
MDC_IDC_SET_LEADCHNL_RV_PACING_CATHODE_ELECTRODE_1: NORMAL
MDC_IDC_SET_LEADCHNL_RV_PACING_CATHODE_LOCATION_1: NORMAL
MDC_IDC_SET_LEADCHNL_RV_PACING_POLARITY: NORMAL
MDC_IDC_SET_LEADCHNL_RV_PACING_PULSEWIDTH: 0.4 MS
MDC_IDC_SET_LEADCHNL_RV_SENSING_ANODE_ELECTRODE_1: NORMAL
MDC_IDC_SET_LEADCHNL_RV_SENSING_ANODE_LOCATION_1: NORMAL
MDC_IDC_SET_LEADCHNL_RV_SENSING_CATHODE_ELECTRODE_1: NORMAL
MDC_IDC_SET_LEADCHNL_RV_SENSING_CATHODE_LOCATION_1: NORMAL
MDC_IDC_SET_LEADCHNL_RV_SENSING_POLARITY: NORMAL
MDC_IDC_SET_LEADCHNL_RV_SENSING_SENSITIVITY: 0.9 MV
MDC_IDC_SET_ZONE_DETECTION_INTERVAL: 350 MS
MDC_IDC_SET_ZONE_DETECTION_INTERVAL: 400 MS
MDC_IDC_SET_ZONE_TYPE: NORMAL
MDC_IDC_STAT_AT_BURDEN_PERCENT: 0 %
MDC_IDC_STAT_AT_DTM_END: NORMAL
MDC_IDC_STAT_AT_DTM_START: NORMAL
MDC_IDC_STAT_BRADY_AP_VP_PERCENT: 21.77 %
MDC_IDC_STAT_BRADY_AP_VS_PERCENT: 0 %
MDC_IDC_STAT_BRADY_AS_VP_PERCENT: 78.2 %
MDC_IDC_STAT_BRADY_AS_VS_PERCENT: 0.03 %
MDC_IDC_STAT_BRADY_DTM_END: NORMAL
MDC_IDC_STAT_BRADY_DTM_START: NORMAL
MDC_IDC_STAT_BRADY_RA_PERCENT_PACED: 21.77 %
MDC_IDC_STAT_BRADY_RV_PERCENT_PACED: 99.97 %
MDC_IDC_STAT_EPISODE_RECENT_COUNT: 0
MDC_IDC_STAT_EPISODE_RECENT_COUNT: 1
MDC_IDC_STAT_EPISODE_RECENT_COUNT_DTM_END: NORMAL
MDC_IDC_STAT_EPISODE_RECENT_COUNT_DTM_START: NORMAL
MDC_IDC_STAT_EPISODE_TOTAL_COUNT: 0
MDC_IDC_STAT_EPISODE_TOTAL_COUNT: 2
MDC_IDC_STAT_EPISODE_TOTAL_COUNT: 265
MDC_IDC_STAT_EPISODE_TOTAL_COUNT_DTM_END: NORMAL
MDC_IDC_STAT_EPISODE_TOTAL_COUNT_DTM_START: NORMAL
MDC_IDC_STAT_EPISODE_TYPE: NORMAL

## 2024-02-06 ENCOUNTER — TELEPHONE (OUTPATIENT)
Dept: VASCULAR SURGERY | Facility: CLINIC | Age: 84
End: 2024-02-06
Payer: MEDICARE

## 2024-02-06 NOTE — TELEPHONE ENCOUNTER
Left Voicemail (2nd Attempt) for the patient to call back and schedule the following:    Appointment type: NPAD  Provider: MIRIAM  Return date: next available  Specialty phone number: [phone]  Additional appointment(s) needed: NA  Additonal Notes: per referral.

## 2024-02-20 ENCOUNTER — ANCILLARY PROCEDURE (OUTPATIENT)
Dept: CARDIOLOGY | Facility: CLINIC | Age: 84
End: 2024-02-20
Attending: INTERNAL MEDICINE
Payer: MEDICARE

## 2024-02-20 DIAGNOSIS — I44.30 ATRIOVENTRICULAR BLOCK: ICD-10-CM

## 2024-02-20 PROCEDURE — 93294 REM INTERROG EVL PM/LDLS PM: CPT | Performed by: INTERNAL MEDICINE

## 2024-02-20 PROCEDURE — 93296 REM INTERROG EVL PM/IDS: CPT

## 2024-02-21 ENCOUNTER — OFFICE VISIT (OUTPATIENT)
Dept: VASCULAR SURGERY | Facility: CLINIC | Age: 84
End: 2024-02-21
Payer: MEDICARE

## 2024-02-21 VITALS — HEART RATE: 86 BPM | DIASTOLIC BLOOD PRESSURE: 75 MMHG | SYSTOLIC BLOOD PRESSURE: 160 MMHG | OXYGEN SATURATION: 96 %

## 2024-02-21 DIAGNOSIS — I10 ESSENTIAL HYPERTENSION: Primary | ICD-10-CM

## 2024-02-21 DIAGNOSIS — E11.59 TYPE 2 DIABETES MELLITUS WITH OTHER CIRCULATORY COMPLICATION, UNSPECIFIED WHETHER LONG TERM INSULIN USE (H): ICD-10-CM

## 2024-02-21 DIAGNOSIS — I73.9: ICD-10-CM

## 2024-02-21 DIAGNOSIS — Z95.0 CARDIAC PACEMAKER IN SITU: ICD-10-CM

## 2024-02-21 PROCEDURE — 99203 OFFICE O/P NEW LOW 30 MIN: CPT | Performed by: SURGERY

## 2024-02-21 NOTE — PATIENT INSTRUCTIONS
"Thank you so much for choosing us for your care. It was a pleasure to see you at the vascular clinic today.     Follow-up recommendations: We will see you back in the vascular clinic on an \"as-needed\" basis. Please contact our clinic at [phone] if any issues arise. You may also send us a [employer] message with any concerns.    Additional testing/imaging ordered today: None      Our scheduling team will get in touch with you to set up any follow-up testing/imaging and/or appointments. Please be aware that any testing/imaging recommended today will need to completed prior to your next visit with the provider. If testing/imaging is not completed prior to your next visit, your visit may be rescheduled.     If you have any questions, please contact our clinic directly at [phone] and ask for the nurse. We also encourage the use of [employer] to communicate with your healthcare provider.    If you have an urgent need after business hours (8:00 am to 4:30 pm) please call [phone], option 4, and ask for the vascular attending on call. For non-urgent after hours needs, please call the vascular clinic at [phone]. For scheduling needs, please call our clinic directly at [phone].    =====================================================================    [facility] is recognized by the [facility] as a comprehensive stroke center. As part of our commitment to better patient outcomes and excellent stroke education, we attach the below stroke education materials to ALL of the after visit summaries in our vascular clinic.        Learning About BE FAST: Stroke Warning Signs  BE FAST is a simple way to remember the main symptoms of stroke. These symptoms happen suddenly. So learning what to look for helps you know when to call for medical help. BE FAST stands for:    B - Balance.  Loss of balance or trouble walking.     E - Eyes.  Trouble seeing out of one or both eyes.     F - Face. "  Weakness or drooping on one side of the face.     A - Arm.  Weakness or numbness in an arm or leg.     S - Speech.  Trouble speaking.     T - Time to call 911.  Also call 911 if you have other stroke symptoms. They include:  Sudden confusion.  Sudden trouble understanding simple statements.  Fainting.  A seizure.  A sudden, severe headache.     A stroke happens when a blood vessel in the brain bursts or is blocked by a blood clot. The blood supply to part of the brain--and the oxygen the blood carries--is reduced. This damages the brain.   If you have a stroke, quick treatment may save your life. And it may reduce the damage in your brain so that you have fewer problems after the stroke.   Current as of: December 18, 2022               Content Version: 13.8    0599-8224 [employer].   Care instructions adapted under license by your healthcare professional. If you have questions about a medical condition or this instruction, always ask your healthcare professional. [employer] disclaims any warranty or liability for your use of this information.    Learning About How to Prevent a Stroke  What is a stroke?  A stroke is damage to the brain that occurs when a blood vessel in the brain bursts or is blocked by a blood clot. Without blood and the oxygen it carries, part of the brain starts to die. The part of the body controlled by the damaged area of the brain can't work properly.  Brain damage can start within minutes of a stroke. But quick treatment can help limit the damage and increase the chance of a full recovery.  What puts you at risk for stroke?  A risk factor is anything that makes you more likely to have a particular health problem.  Risk factors for stroke that you can manage or change include:  Health problems like atrial fibrillation, diabetes, high blood pressure, high cholesterol, hardening of the arteries (atherosclerosis), and sickle cell disease.  Smoking.  Drinking more than  2 alcoholic drinks a day for men and 1 drink a day for women.  Being overweight.  Not eating healthy foods.  Not getting enough physical activity.  Risk factors you can't change include:  Having a previous stroke.  Family history of stroke.  Being older.  Being , [place] Native, , or South  American.  Being female.  Having certain problems during pregnancy, such as preeclampsia.  Being past menopause.  Your doctor can help you know your risk. Then you and your doctor can talk about whether to take steps to lower it.  How can you help prevent a stroke?  Here are some things you can do to help prevent a stroke.  Manage health problems that raise your risk. These include atrial fibrillation, diabetes, high blood pressure, and high cholesterol.  Have a heart-healthy lifestyle.  Don't smoke. If you need help quitting, talk to your doctor about stop-smoking programs and medicines. These can increase your chances of quitting for good.  Limit alcohol to 2 drinks a day for men and 1 drink a day for women.  Stay at a healthy weight. Lose weight if you need to.  Be active. Get at least 30 minutes of exercise on most days of the week. Walking is a good choice. You also may want to do other activities, such as running, swimming, cycling, or playing tennis or team sports.  Eat heart-healthy foods. These include vegetables, fruits, nuts, beans, lean meat, fish, and whole grains. Limit sodium and sugar.  If you think you may have a problem with alcohol or drug use, talk to your doctor.  If you use hormone therapy for menopause or hormonal birth control, talk with your doctor. Ask if these are right for you. They may raise the risk of stroke in some people.  Decide with your doctor whether you will also take medicines to help lower your risk. For example, you and your doctor may decide you will take a medicine that prevents blood clots.  What are the symptoms of a stroke?  Symptoms of a stroke  happen quickly. A stroke may cause:  Sudden numbness, tingling, weakness, or loss of movement in your face, arm, or leg, especially on only one side of your body.  Sudden vision changes.  Sudden trouble speaking.  Sudden confusion or trouble understanding simple statements.  Sudden problems with walking or balance.  A sudden, severe headache that is different from past headaches.  Fainting.  A seizure.  It's important to call for medical help if you have stroke symptoms. Quick treatment may save your life. And it may reduce the damage in your brain so that you have fewer problems after the stroke.  Follow-up care is a key part of your treatment and safety. Be sure to make and go to all appointments, and call your doctor if you are having problems. It's also a good idea to know your test results and keep a list of the medicines you take.    Current as of: December 18, 2022               Content Version: 13.8    [id number] [employer].   Care instructions adapted under license by your healthcare professional. If you have questions about a medical condition or this instruction, always ask your healthcare professional. [employer] disclaims any warranty or liability for your use of this information.

## 2024-02-21 NOTE — LETTER
2/21/2024       RE: Ashley Lynn  [address]  [facility] [address]     Dear Colleague,    Thank you for referring your patient, Ashley Lynn, to the [facility] [place] at [facility]. Please see a copy of my visit note below.    Vascular & Endovascular Surgery Clinic Consultation   Vascular Surgeon: Semaj Ulloa MD, RPVI       Location:  [facility]    Ashley Lynn  Medical Record #:  [id number]  YOB: 1940  Age:  83 year old     Date of Service: 2/21/2024    Referring Provider: Maksim Weston.  Primary Care Provider: Nicholas Velazquez    Chief Complaint/Reason for Visit: Skin lesions on foot    HPI:  Ms. Lynn is a pleasant 83 year old female seen today with her son today for vascular evaluation in setting of left foot discolored lesion.  She has some foot pain although it is intermittent and not predictable.  He son aids the discussion as she is quite stoic and denies much leg discomfort.  She has no open wounds or ischemic rest pain. No overt claudication    CV risk factors include diabetes mellitus, AV Block status post pacemaker, hypertension, hyperlipidemia, and is not a smoker    Relevant surgical history:  - Permanent pacemaker placement    Review of Systems:    A 12 point ROS was reviewed and is negative except for symptoms noted in HPI.     Medical/Surgical History:    Past Medical History:   Diagnosis Date    Cardiac pacemaker in situ 10/07/2020    Type 2 diabetes mellitus without complication, without long-term current use of insulin (H) 10/07/2020         Past Surgical History:   Procedure Laterality Date    IMPLANT PACEMAKER          Allergies:   No Known Allergies     Medications:    Current Outpatient Medications   Medication    Continuous Blood Gluc  (FREESTYLE ANURAG 14 DAY READER) STEVIE    Continuous Blood Gluc Sensor (FREESTYLE ANURAG 14 DAY  SENSOR) MISC    lisinopril-hydrochlorothiazide (ZESTORETIC) 10-12.5 MG tablet    Multiple Vitamins-Minerals (MULTIVITAMIN ADULT PO)    rosuvastatin (CRESTOR) 10 MG tablet     No current facility-administered medications for this visit.        Social Habits:    Tobacco Use      Smoking status: Never        Passive exposure: Never (per pt)      Smokeless tobacco: Never       Alcohol Use: Not At Risk (10/7/2020)    AUDIT-C     Frequency of Alcohol Consumption: Never     Average Number of Drinks: Not asked     Frequency of Binge Drinking: Never       History   Drug Use Not on file        Family History:  No family history on file.    Physical Examination:  BP (!) 160/75 (BP Location: Left arm, Patient Position: Sitting, Cuff Size: Adult Regular)   Pulse 86   SpO2 96%   Wt Readings from Last 1 Encounters:   02/22/24 56.7 kg (125 lb)     There is no height or weight on file to calculate BMI.    Exam:  General: No apparent distress. Answers questions appropriately   Neurologic: Focally intact, Alert and oriented x 3.   Eyes: Grossly normal.   Cardiac:  RRR  Pulmonary:  Non labored breathing with normal respiratory effort  Abdominal: Soft, non distended, non tender to palpation.     Musculoskeletal/Extremity: 4/5 gross lower extremity strength. mild edema.  No open wounds or abrasions, but does have a dark spot on her left foot that is not a wound nor gangrene.    Vascular Exam:     DP: Left: 2+   Right: 2+    Laboratory Findings:  Creatinine   Date Value Ref Range Status   11/21/2023 1.72 (H) 0.51 - 0.95 mg/dL Final   10/07/2020 1.1 0.6 - 1.3 mg/dL Final     Sodium   Date Value Ref Range Status   11/21/2023 137 135 - 145 mmol/L Final     Comment:     Reference intervals for this test were updated on 09/26/2023 to more accurately reflect our healthy population. There may be differences in the flagging of prior results with similar values performed with this method. Interpretation of those prior results can be made in the  context of the updated reference intervals.    10/07/2020 142.0 137.3 - 146.3 mmol/L Final     Glucose   Date Value Ref Range Status   11/21/2023 194 (H) 70 - 99 mg/dL Final   04/28/2023 138 (H) 70 - 99 mg/dL Final   03/09/2022 153 (H) 70 - 99 mg/dL Final   01/27/2022 169 (H) 70 - 99 mg/dL Final   10/07/2020 77.0 60.0 - 99.0 mg/dL Final     Hemoglobin A1C   Date Value Ref Range Status   11/21/2023 7.5 (H) <5.7 % Final     Comment:     Normal <5.7%   Prediabetes 5.7-6.4%    Diabetes 6.5% or higher     Note: Adopted from ADA consensus guidelines.   04/28/2023 6.6 (H) 0.0 - 5.6 % Final     Comment:     Normal <5.7%   Prediabetes 5.7-6.4%    Diabetes 6.5% or higher     Note: Adopted from ADA consensus guidelines.   10/07/2020 6.8 (H) 4.1 - 5.7 % Final       Imaging:    I personally reviewed the Non invasive vascular labs and arterial duplex ultrasound from 1/11/2024 which shows non compressible right BRENNEN, normal left BRENNEN.  She has normal arterial waveforms throughout both sides without focal velocity elevation or stenosis by velocity criteria with the exception of left distal PT occlusion.    Impression/Shared Medical Decision Making:    #1- Mild asymptomatic peripheral arterial disease  #2- Left foot discoloration, not consistent with gangrene or ulceration  #3- Hypertension  #4- Hyperlipidemia  #5- Heart block status post pacemaker placement  #6- Diabetes mellitus  Ms. Lynn is a pleasant 83 year old female evaluated for a dark spot on her foot.  She has mild asymptomatic peripheral arterial disease with asymptomatic PT occlusion on that side, and palpable pulses in her feet.  We discussed the natural history and etiology of peripheral arterial disease as well as modifiable risk factors.  We discussed the low risk of amputation with claudication and asymptomatic disease as opposed to limb threatening ischemia.  We reviewed the guidelines for medical therapy and exercise therapy rather than surgical intervention  for claudication and that often intervention carries more risk than benefit, particularly with the potential to increase risks of limb loss if interventions occlude.  It is my strong recommendation for optimal medical therapy with antiplatelet and statin therapy as well as smoking cessation in smokers.  Discussed importance of walking programs.    Discussed warning signs including, but not limited to, acute limb ischemia, vascular lower extremity pain, motor or sensory dysfunction, chronic limb threatening ischemia, ischemic rest pain, and wounds.  If she experiences any of these, she has been advised to seek treatment and contact my team.    Ms. Lynn and her son are in agreement with this plan as outlined.    Recommendations/Plan:  Continue optimal medical therapy with aspirin and statin.    Will continue to avoid tobacco and nicotine products.  Recommended a walking program, although she is asymptomatic as opposed to claudication it would still be beneficial for her cardiovascular health and peripheral arterial disease. Example as below  Initially she will test walking distance to the point where she cannot walk any further without stopping and count this number of step as a baseline for exercise  she needs to have dedicated exercise time 3x per week  During these, she will do 3 walks where she walks 110-120% of his maximum baseline steps resting in between.  Every 3-4 weeks, or when discomfort with walking is decreasing, she will retest walking distance baseline to restart the process  she will keep a journal with his progress for a more objective measure  In setting of asymptomatic peripheral arterial disease she can follow up as needed.        30 minutes spent on the day of encounter doing chart review from our system and care everywhere, history and exam, documentation, coordinating care, and further activities as noted with over half spent counseling.         Again, thank you for allowing me to  participate in the care of your patient.      Sincerely,    Semaj Ulloa MD

## 2024-02-22 ENCOUNTER — VIRTUAL VISIT (OUTPATIENT)
Dept: CARDIOLOGY | Facility: CLINIC | Age: 84
End: 2024-02-22
Attending: INTERNAL MEDICINE
Payer: MEDICARE

## 2024-02-22 VITALS — BODY MASS INDEX: 23.6 KG/M2 | HEIGHT: 61 IN | WEIGHT: 125 LBS

## 2024-02-22 DIAGNOSIS — Z95.0 CARDIAC PACEMAKER IN SITU: Primary | ICD-10-CM

## 2024-02-22 DIAGNOSIS — I44.2 COMPLETE HEART BLOCK BY ELECTROCARDIOGRAM (H): ICD-10-CM

## 2024-02-22 PROCEDURE — 99214 OFFICE O/P EST MOD 30 MIN: CPT | Mod: 95 | Performed by: INTERNAL MEDICINE

## 2024-02-22 ASSESSMENT — PAIN SCALES - GENERAL: PAINLEVEL: NO PAIN (0)

## 2024-02-22 NOTE — LETTER
2/22/2024      RE: Ashley Lynn  [address] [facility] [address]       Dear Colleague,    Thank you for the opportunity to participate in the care of your patient, Ashley Lynn, at the [facility] [place] at [facility]. Please see a copy of my visit note below.    HPI:    Mrs Lynn is a pleasant 83-year-old woman who had a pacemaker implanted for AV block and previously been followed by .    Patient is feeling well and is attending the clinic today with her son.  Her son thought that she had a number of questions related to the pacemaker but patient does not recall any.  I indicated to her that she might have questions in the future and she should write them down so that we could discuss what ever is of her concern.    At today's visit I reviewed with patient and son the results of her most recent pacemaker check, the summary of which is reproduced below.    DEVICE CHECK 2/19/24  Device: Note [id number] [place] XT DR CONTRERAS  Normal Device Function  Mode: DDD  bpm  AP: 31.7%  : 100%  Presenting EGM: AS- @ 68 bpm  Short V-V intervals: 0  Lead Trends Appear Stable: Yes  Estimated battery longevity to RRT = 10 years. Battery voltage = 3.00 V.   Atrial Arrhythmia: Total AT/AF time = 10 seconds.  AF [place]: <0.1%  Anticoagulant: None  Ventricular Arrhythmia: 1 NSVT episode recorded - 6 beats, 200 bpm  Pt Notified of Transmission Results: Yes     After reviewing the data and explaining to the patient that there is about 10 years of battery life remaining, I reemphasized the value of their home remote monitor as both a means of reducing clinic visits and also as a means of downloading information should they have some concerns regarding pacemaker operation in the future.  Both patient and son voiced understanding.    At the present time Mrs. Lynn did not have any new cardiovascular complaints or concerns.      PAST MEDICAL  "HISTORY:  Past Medical History:   Diagnosis Date    Cardiac pacemaker in situ 10/07/2020    Type 2 diabetes mellitus without complication, without long-term current use of insulin (H) 10/07/2020       CURRENT MEDICATIONS:  Current Outpatient Medications   Medication Sig Dispense Refill    Continuous Blood Gluc  (FREESTYLE ANURAG 14 DAY READER) STEVIE Use to read blood sugars as per 's instructions. 1 each 0    Continuous Blood Gluc Sensor (FREESTYLE ANURAG 14 DAY SENSOR) MISC Change every 14 days. 8 each 3    lisinopril-hydrochlorothiazide (ZESTORETIC) 10-12.5 MG tablet Take 1 tablet by mouth daily 90 tablet 0    Multiple Vitamins-Minerals (MULTIVITAMIN ADULT PO)       rosuvastatin (CRESTOR) 10 MG tablet Take 1 tablet (10 mg) by mouth daily 90 tablet 0       PAST SURGICAL HISTORY:  Past Surgical History:   Procedure Laterality Date    IMPLANT PACEMAKER         ALLERGIES:   No Known Allergies    FAMILY HISTORY:  No pertinent family history    SOCIAL HISTORY:  Social History     Tobacco Use    Smoking status: Never     Passive exposure: Never (per pt)    Smokeless tobacco: Never   Substance Use Topics    Alcohol use: Never       ROS:   Constitutional: No fever, chills, or sweats. Weight stable.   ENT: No visual disturbance,   Cardiovascular: As per HPI.   Respiratory: No cough, hemoptysis.    GI: No nausea, vomiting,  : No hematuria or other genitourinary complaint.   Integument: Negative.   Psychiatric: Negative.   Hematologic:   no easy bleeding.  Neuro: Negative.   Endocrinology: No significant heat or cold intolerance   Musculoskeletal: No myalgia.  Or other rheumatologic concerns at this time    Exam:  Ht 1.549 m (5' 1\")   Wt 56.7 kg (125 lb)   BMI 23.62 kg/m    GENERAL APPEARANCE: healthy, alert and no distress  HEENT: no icterus,   NECK:  JVP not elevated  RESPIRATORY: no rales, rhonchi or wheezes, no use of accessory muscles, no retractions, respirations are unlabored, normal respiratory " "rate  NEURO: alert and oriented to person/place/time, normal speech, gait and affect.  SKIN: no ecchymoses, no rashes    Labs:  CBC RESULTS:   No results found for: \"WBC\", \"RBC\", \"HGB\", \"HCT\", \"MCV\", \"MCH\", \"MCHC\", \"RDW\", \"PLT\"    BMP RESULTS:  Lab Results   Component Value Date     11/21/2023    .0 10/07/2020    POTASSIUM 4.0 11/21/2023    POTASSIUM 4.5 03/09/2022    POTASSIUM 4.3 10/07/2020    CHLORIDE 102 11/21/2023    CHLORIDE 105 03/09/2022    CHLORIDE 102.0 10/07/2020    CO2 24 11/21/2023    CO2 27 03/09/2022    CO2 28.0 10/07/2020    ANIONGAP 11 11/21/2023    ANIONGAP 6 03/09/2022     (H) 11/21/2023     (H) 03/09/2022    GLC 77.0 10/07/2020    BUN 27.9 (H) 11/21/2023    BUN 24 03/09/2022    BUN 14.0 10/07/2020    CR 1.72 (H) 11/21/2023    CR 1.1 10/07/2020    GFRESTIMATED 29 (L) 11/21/2023    YOGI 9.4 11/21/2023    YOGI 9.1 10/07/2020        INR RESULTS:  No results found for: \"INR\"    Procedures:  PULMONARY FUNCTION TESTS:        No data to display                  ECHOCARDIOGRAM:   No results found for this or any previous visit (from the past 8760 hour(s)).      Assessment and Plan:   1.  Complete AV block with pacemaker in situ with 100% pacing in the ventricle  2.  Recent device check with battery estimated longevity of 10 years.  3.  No new cardiovascular complaints on the part of the patient or son who was attending the clinic      Total elapsed time today with chart review, clinic visit and documentation 30 minutes      I very much appreciated the opportunity to see and assess Ashley Lynn in the clinic today.. Please do not hesitate to contact my office if you have any questions or concerns.          Please do not hesitate to contact me if you have any questions/concerns.     Sincerely,     Karlo Moreno MD  "

## 2024-02-22 NOTE — NURSING NOTE
Is the patient currently in the state of [place]? YES    Visit mode:VIDEO    If the visit is dropped, the patient can be reconnected by: VIDEO VISIT: Text to cell phone:   Telephone Information:   Mobile [phone]       Will anyone else be joining the visit? Yes, Pt's son Justin is with the pt and will be joining the video visit per pt   (If patient encounters technical issues they should call [phone]    How would you like to obtain your AVS? MyChart    Are changes needed to the allergy or medication list? No    Reason for visit: Consult    No other vitals to report per pt    Kelly VELAF

## 2024-02-22 NOTE — PROGRESS NOTES
Virtual Visit Details    Type of service:  Video Visit     HPI:    Mrs Lynn is a pleasant 83-year-old woman who had a pacemaker implanted for AV block and previously been followed by .    Patient is feeling well and is attending the clinic today with her son.  Her son thought that she had a number of questions related to the pacemaker but patient does not recall any.  I indicated to her that she might have questions in the future and she should write them down so that we could discuss what ever is of her concern.    At today's visit I reviewed with patient and son the results of her most recent pacemaker check, the summary of which is reproduced below.    DEVICE CHECK 2/19/24  Device: [employer] [id number] [place] XT DR CONTRERAS  Normal Device Function  Mode: DDD  bpm  AP: 31.7%  : 100%  Presenting EGM: AS- @ 68 bpm  Short V-V intervals: 0  Lead Trends Appear Stable: Yes  Estimated battery longevity to RRT = 10 years. Battery voltage = 3.00 V.   Atrial Arrhythmia: Total AT/AF time = 10 seconds.  AF [place]: <0.1%  Anticoagulant: None  Ventricular Arrhythmia: 1 NSVT episode recorded - 6 beats, 200 bpm  Pt Notified of Transmission Results: Yes     After reviewing the data and explaining to the patient that there is about 10 years of battery life remaining, I reemphasized the value of their home remote monitor as both a means of reducing clinic visits and also as a means of downloading information should they have some concerns regarding pacemaker operation in the future.  Both patient and son voiced understanding.    At the present time Mrs. Lynn did not have any new cardiovascular complaints or concerns.      PAST MEDICAL HISTORY:  Past Medical History:   Diagnosis Date    Cardiac pacemaker in situ 10/07/2020    Type 2 diabetes mellitus without complication, without long-term current use of insulin (H) 10/07/2020       CURRENT MEDICATIONS:  Current Outpatient Medications   Medication Sig Dispense  "Refill    Continuous Blood Gluc  (FREESTYLE ANURAG 14 DAY READER) STEVIE Use to read blood sugars as per 's instructions. 1 each 0    Continuous Blood Gluc Sensor (FREESTYLE ANURAG 14 DAY SENSOR) MISC Change every 14 days. 8 each 3    lisinopril-hydrochlorothiazide (ZESTORETIC) 10-12.5 MG tablet Take 1 tablet by mouth daily 90 tablet 0    Multiple Vitamins-Minerals (MULTIVITAMIN ADULT PO)       rosuvastatin (CRESTOR) 10 MG tablet Take 1 tablet (10 mg) by mouth daily 90 tablet 0       PAST SURGICAL HISTORY:  Past Surgical History:   Procedure Laterality Date    IMPLANT PACEMAKER         ALLERGIES:   No Known Allergies    FAMILY HISTORY:  No pertinent family history    SOCIAL HISTORY:  Social History     Tobacco Use    Smoking status: Never     Passive exposure: Never (per pt)    Smokeless tobacco: Never   Substance Use Topics    Alcohol use: Never       ROS:   Constitutional: No fever, chills, or sweats. Weight stable.   ENT: No visual disturbance,   Cardiovascular: As per HPI.   Respiratory: No cough, hemoptysis.    GI: No nausea, vomiting,  : No hematuria or other genitourinary complaint.   Integument: Negative.   Psychiatric: Negative.   Hematologic:   no easy bleeding.  Neuro: Negative.   Endocrinology: No significant heat or cold intolerance   Musculoskeletal: No myalgia.  Or other rheumatologic concerns at this time    Exam:  Ht 1.549 m (5' 1\")   Wt 56.7 kg (125 lb)   BMI 23.62 kg/m    GENERAL APPEARANCE: healthy, alert and no distress  HEENT: no icterus,   NECK:  JVP not elevated  RESPIRATORY: no rales, rhonchi or wheezes, no use of accessory muscles, no retractions, respirations are unlabored, normal respiratory rate  NEURO: alert and oriented to person/place/time, normal speech, gait and affect.  SKIN: no ecchymoses, no rashes    Labs:  CBC RESULTS:   No results found for: \"WBC\", \"RBC\", \"HGB\", \"HCT\", \"MCV\", \"MCH\", \"MCHC\", \"RDW\", \"PLT\"    BMP RESULTS:  Lab Results   Component Value Date    " " 11/21/2023    .0 10/07/2020    POTASSIUM 4.0 11/21/2023    POTASSIUM 4.5 03/09/2022    POTASSIUM 4.3 10/07/2020    CHLORIDE 102 11/21/2023    CHLORIDE 105 03/09/2022    CHLORIDE 102.0 10/07/2020    CO2 24 11/21/2023    CO2 27 03/09/2022    CO2 28.0 10/07/2020    ANIONGAP 11 11/21/2023    ANIONGAP 6 03/09/2022     (H) 11/21/2023     (H) 03/09/2022    GLC 77.0 10/07/2020    BUN 27.9 (H) 11/21/2023    BUN 24 03/09/2022    BUN 14.0 10/07/2020    CR 1.72 (H) 11/21/2023    CR 1.1 10/07/2020    GFRESTIMATED 29 (L) 11/21/2023    YOGI 9.4 11/21/2023    YOGI 9.1 10/07/2020        INR RESULTS:  No results found for: \"INR\"    Procedures:  PULMONARY FUNCTION TESTS:        No data to display                  ECHOCARDIOGRAM:   No results found for this or any previous visit (from the past 8760 hour(s)).      Assessment and Plan:   1.  Complete AV block with pacemaker in situ with 100% pacing in the ventricle  2.  Recent device check with battery estimated longevity of 10 years.  3.  No new cardiovascular complaints on the part of the patient or son who was attending the clinic      Total elapsed time today with chart review, clinic visit and documentation 30 minutes    Video on 3: 30 p.m. off 3: 4 8 PM  Platform [employer]  Patient at home; clinic [facility] heart    I very much appreciated the opportunity to see and assess Ashley Lynn in the clinic today.. Please do not hesitate to contact my office if you have any questions or concerns.      Karlo Moreno MD  Cardiac Arrhythmia Service  [facility]  [phone]     CC  ALEXA JOSÉ, FLORENTINO TURCIOS    "

## 2024-02-22 NOTE — PATIENT INSTRUCTIONS
You were seen in the Electrophysiology Clinic today by: Dr Moreno    Plan:       Follow up Visit:  1 year with Dr Moreno or EP NELLY with a device check prior      If you have further questions, please utilize [employer] to contact us.     Your Care Team:    EP Cardiology   Telephone Number     Nurse Line  Flory Puckett, RN   Nelida Molina, RN  Jose Miguel Nieves, AYDEN   [phone]     For scheduling appointments:   Chaitanya   [phone]   For procedure scheduling:    Sonia Gar     [phone]   For the Device Clinic (Pacemakers, ICDs, Loop Recorders)    During business hours: [phone]  After business hours:   [phone]- select option 4 and ask for job code [id number].       On-call cardiologist for after hours or on weekends:   [phone], option #4, and ask to speak to the on-call cardiologist.     Cardiovascular Clinic:   [address] [place] [address]. [place] [address]      As always, Thank you for trusting us with your health care needs!

## 2024-02-25 ENCOUNTER — HEALTH MAINTENANCE LETTER (OUTPATIENT)
Age: 84
End: 2024-02-25

## 2024-02-29 LAB
MDC_IDC_EPISODE_DTM: NORMAL
MDC_IDC_EPISODE_DURATION: 1 S
MDC_IDC_EPISODE_ID: 268
MDC_IDC_EPISODE_TYPE: NORMAL
MDC_IDC_LEAD_CONNECTION_STATUS: NORMAL
MDC_IDC_LEAD_CONNECTION_STATUS: NORMAL
MDC_IDC_LEAD_IMPLANT_DT: NORMAL
MDC_IDC_LEAD_IMPLANT_DT: NORMAL
MDC_IDC_LEAD_LOCATION: NORMAL
MDC_IDC_LEAD_LOCATION: NORMAL
MDC_IDC_LEAD_LOCATION_DETAIL_1: NORMAL
MDC_IDC_LEAD_LOCATION_DETAIL_1: NORMAL
MDC_IDC_LEAD_MFG: NORMAL
MDC_IDC_LEAD_MFG: NORMAL
MDC_IDC_LEAD_MODEL: NORMAL
MDC_IDC_LEAD_MODEL: NORMAL
MDC_IDC_LEAD_POLARITY_TYPE: NORMAL
MDC_IDC_LEAD_POLARITY_TYPE: NORMAL
MDC_IDC_LEAD_SERIAL: NORMAL
MDC_IDC_LEAD_SERIAL: NORMAL
MDC_IDC_LEAD_SPECIAL_FUNCTION: NORMAL
MDC_IDC_LEAD_SPECIAL_FUNCTION: NORMAL
MDC_IDC_MSMT_BATTERY_DTM: NORMAL
MDC_IDC_MSMT_BATTERY_REMAINING_LONGEVITY: 120 MO
MDC_IDC_MSMT_BATTERY_RRT_TRIGGER: 2.62
MDC_IDC_MSMT_BATTERY_STATUS: NORMAL
MDC_IDC_MSMT_BATTERY_VOLTAGE: 3 V
MDC_IDC_MSMT_LEADCHNL_RA_IMPEDANCE_VALUE: 494 OHM
MDC_IDC_MSMT_LEADCHNL_RA_IMPEDANCE_VALUE: 532 OHM
MDC_IDC_MSMT_LEADCHNL_RA_PACING_THRESHOLD_AMPLITUDE: 0.5 V
MDC_IDC_MSMT_LEADCHNL_RA_PACING_THRESHOLD_PULSEWIDTH: 0.4 MS
MDC_IDC_MSMT_LEADCHNL_RA_SENSING_INTR_AMPL: 3.25 MV
MDC_IDC_MSMT_LEADCHNL_RA_SENSING_INTR_AMPL: 3.25 MV
MDC_IDC_MSMT_LEADCHNL_RV_IMPEDANCE_VALUE: 323 OHM
MDC_IDC_MSMT_LEADCHNL_RV_IMPEDANCE_VALUE: 437 OHM
MDC_IDC_MSMT_LEADCHNL_RV_PACING_THRESHOLD_AMPLITUDE: 0.75 V
MDC_IDC_MSMT_LEADCHNL_RV_PACING_THRESHOLD_PULSEWIDTH: 0.4 MS
MDC_IDC_MSMT_LEADCHNL_RV_SENSING_INTR_AMPL: 3 MV
MDC_IDC_MSMT_LEADCHNL_RV_SENSING_INTR_AMPL: 3 MV
MDC_IDC_PG_IMPLANT_DTM: NORMAL
MDC_IDC_PG_MFG: NORMAL
MDC_IDC_PG_MODEL: NORMAL
MDC_IDC_PG_SERIAL: NORMAL
MDC_IDC_PG_TYPE: NORMAL
MDC_IDC_SESS_CLINIC_NAME: NORMAL
MDC_IDC_SESS_DTM: NORMAL
MDC_IDC_SESS_TYPE: NORMAL
MDC_IDC_SET_BRADY_AT_MODE_SWITCH_RATE: 171 {BEATS}/MIN
MDC_IDC_SET_BRADY_HYSTRATE: NORMAL
MDC_IDC_SET_BRADY_LOWRATE: 60 {BEATS}/MIN
MDC_IDC_SET_BRADY_MAX_SENSOR_RATE: 120 {BEATS}/MIN
MDC_IDC_SET_BRADY_MAX_TRACKING_RATE: 120 {BEATS}/MIN
MDC_IDC_SET_BRADY_MODE: NORMAL
MDC_IDC_SET_BRADY_PAV_DELAY_LOW: 220 MS
MDC_IDC_SET_BRADY_SAV_DELAY_LOW: 200 MS
MDC_IDC_SET_LEADCHNL_RA_PACING_AMPLITUDE: 1.5 V
MDC_IDC_SET_LEADCHNL_RA_PACING_ANODE_ELECTRODE_1: NORMAL
MDC_IDC_SET_LEADCHNL_RA_PACING_ANODE_LOCATION_1: NORMAL
MDC_IDC_SET_LEADCHNL_RA_PACING_CAPTURE_MODE: NORMAL
MDC_IDC_SET_LEADCHNL_RA_PACING_CATHODE_ELECTRODE_1: NORMAL
MDC_IDC_SET_LEADCHNL_RA_PACING_CATHODE_LOCATION_1: NORMAL
MDC_IDC_SET_LEADCHNL_RA_PACING_POLARITY: NORMAL
MDC_IDC_SET_LEADCHNL_RA_PACING_PULSEWIDTH: 0.4 MS
MDC_IDC_SET_LEADCHNL_RA_SENSING_ANODE_ELECTRODE_1: NORMAL
MDC_IDC_SET_LEADCHNL_RA_SENSING_ANODE_LOCATION_1: NORMAL
MDC_IDC_SET_LEADCHNL_RA_SENSING_CATHODE_ELECTRODE_1: NORMAL
MDC_IDC_SET_LEADCHNL_RA_SENSING_CATHODE_LOCATION_1: NORMAL
MDC_IDC_SET_LEADCHNL_RA_SENSING_POLARITY: NORMAL
MDC_IDC_SET_LEADCHNL_RA_SENSING_SENSITIVITY: 0.3 MV
MDC_IDC_SET_LEADCHNL_RV_PACING_AMPLITUDE: 1.5 V
MDC_IDC_SET_LEADCHNL_RV_PACING_ANODE_ELECTRODE_1: NORMAL
MDC_IDC_SET_LEADCHNL_RV_PACING_ANODE_LOCATION_1: NORMAL
MDC_IDC_SET_LEADCHNL_RV_PACING_CAPTURE_MODE: NORMAL
MDC_IDC_SET_LEADCHNL_RV_PACING_CATHODE_ELECTRODE_1: NORMAL
MDC_IDC_SET_LEADCHNL_RV_PACING_CATHODE_LOCATION_1: NORMAL
MDC_IDC_SET_LEADCHNL_RV_PACING_POLARITY: NORMAL
MDC_IDC_SET_LEADCHNL_RV_PACING_PULSEWIDTH: 0.4 MS
MDC_IDC_SET_LEADCHNL_RV_SENSING_ANODE_ELECTRODE_1: NORMAL
MDC_IDC_SET_LEADCHNL_RV_SENSING_ANODE_LOCATION_1: NORMAL
MDC_IDC_SET_LEADCHNL_RV_SENSING_CATHODE_ELECTRODE_1: NORMAL
MDC_IDC_SET_LEADCHNL_RV_SENSING_CATHODE_LOCATION_1: NORMAL
MDC_IDC_SET_LEADCHNL_RV_SENSING_POLARITY: NORMAL
MDC_IDC_SET_LEADCHNL_RV_SENSING_SENSITIVITY: 0.9 MV
MDC_IDC_SET_ZONE_DETECTION_INTERVAL: 350 MS
MDC_IDC_SET_ZONE_DETECTION_INTERVAL: 400 MS
MDC_IDC_SET_ZONE_STATUS: NORMAL
MDC_IDC_SET_ZONE_STATUS: NORMAL
MDC_IDC_SET_ZONE_TYPE: NORMAL
MDC_IDC_SET_ZONE_VENDOR_TYPE: NORMAL
MDC_IDC_STAT_AT_BURDEN_PERCENT: 0.1 %
MDC_IDC_STAT_AT_DTM_END: NORMAL
MDC_IDC_STAT_AT_DTM_START: NORMAL
MDC_IDC_STAT_BRADY_AP_VP_PERCENT: 31.7 %
MDC_IDC_STAT_BRADY_AP_VS_PERCENT: 0 %
MDC_IDC_STAT_BRADY_AS_VP_PERCENT: 68.27 %
MDC_IDC_STAT_BRADY_AS_VS_PERCENT: 0.04 %
MDC_IDC_STAT_BRADY_DTM_END: NORMAL
MDC_IDC_STAT_BRADY_DTM_START: NORMAL
MDC_IDC_STAT_BRADY_RA_PERCENT_PACED: 31.69 %
MDC_IDC_STAT_BRADY_RV_PERCENT_PACED: 99.96 %
MDC_IDC_STAT_EPISODE_RECENT_COUNT: 0
MDC_IDC_STAT_EPISODE_RECENT_COUNT: 1
MDC_IDC_STAT_EPISODE_RECENT_COUNT_DTM_END: NORMAL
MDC_IDC_STAT_EPISODE_RECENT_COUNT_DTM_START: NORMAL
MDC_IDC_STAT_EPISODE_TOTAL_COUNT: 0
MDC_IDC_STAT_EPISODE_TOTAL_COUNT: 265
MDC_IDC_STAT_EPISODE_TOTAL_COUNT: 3
MDC_IDC_STAT_EPISODE_TOTAL_COUNT_DTM_END: NORMAL
MDC_IDC_STAT_EPISODE_TOTAL_COUNT_DTM_START: NORMAL
MDC_IDC_STAT_EPISODE_TYPE: NORMAL
MDC_IDC_STAT_TACHYTHERAPY_RECENT_DTM_END: NORMAL
MDC_IDC_STAT_TACHYTHERAPY_RECENT_DTM_START: NORMAL
MDC_IDC_STAT_TACHYTHERAPY_TOTAL_DTM_END: NORMAL
MDC_IDC_STAT_TACHYTHERAPY_TOTAL_DTM_START: NORMAL

## 2024-03-31 NOTE — PROGRESS NOTES
Vascular & Endovascular Surgery Clinic Consultation   Vascular Surgeon: Semaj Ulloa MD, RPVI       Location:  [facility]    Ashley Lynn  Medical Record #:  [id number]  YOB: 1940  Age:  83 year old     Date of Service: 2/21/2024    Referring Provider: Maksim Weston.  Primary Care Provider: Nicholas Velazquez    Chief Complaint/Reason for Visit: Skin lesions on foot    HPI:  Ms. Lynn is a pleasant 83 year old female seen today with her son today for vascular evaluation in setting of left foot discolored lesion.  She has some foot pain although it is intermittent and not predictable.  He son aids the discussion as she is quite stoic and denies much leg discomfort.  She has no open wounds or ischemic rest pain. No overt claudication    CV risk factors include diabetes mellitus, AV Block status post pacemaker, hypertension, hyperlipidemia, and is not a smoker    Relevant surgical history:  - Permanent pacemaker placement    Review of Systems:    A 12 point ROS was reviewed and is negative except for symptoms noted in HPI.     Medical/Surgical History:    Past Medical History:   Diagnosis Date    Cardiac pacemaker in situ 10/07/2020    Type 2 diabetes mellitus without complication, without long-term current use of insulin (H) 10/07/2020         Past Surgical History:   Procedure Laterality Date    IMPLANT PACEMAKER          Allergies:   No Known Allergies     Medications:    Current Outpatient Medications   Medication    Continuous Blood Gluc  (FREESTYLE ANURAG 14 DAY READER) STEVIE    Continuous Blood Gluc Sensor (FREESTYLE ANURAG 14 DAY SENSOR) MISC    lisinopril-hydrochlorothiazide (ZESTORETIC) 10-12.5 MG tablet    Multiple Vitamins-Minerals (MULTIVITAMIN ADULT PO)    rosuvastatin (CRESTOR) 10 MG tablet     No current facility-administered medications for this visit.        Social Habits:    Tobacco Use      Smoking status: Never         Passive exposure: Never (per pt)      Smokeless tobacco: Never       Alcohol Use: Not At Risk (10/7/2020)    AUDIT-C     Frequency of Alcohol Consumption: Never     Average Number of Drinks: Not asked     Frequency of Binge Drinking: Never       History   Drug Use Not on file        Family History:  No family history on file.    Physical Examination:  BP (!) 160/75 (BP Location: Left arm, Patient Position: Sitting, Cuff Size: Adult Regular)   Pulse 86   SpO2 96%   Wt Readings from Last 1 Encounters:   02/22/24 56.7 kg (125 lb)     There is no height or weight on file to calculate BMI.    Exam:  General: No apparent distress. Answers questions appropriately   Neurologic: Focally intact, Alert and oriented x 3.   Eyes: Grossly normal.   Cardiac:  RRR  Pulmonary:  Non labored breathing with normal respiratory effort  Abdominal: Soft, non distended, non tender to palpation.     Musculoskeletal/Extremity: 4/5 gross lower extremity strength. mild edema.  No open wounds or abrasions, but does have a dark spot on her left foot that is not a wound nor gangrene.    Vascular Exam:     DP: Left: 2+   Right: 2+    Laboratory Findings:  Creatinine   Date Value Ref Range Status   11/21/2023 1.72 (H) 0.51 - 0.95 mg/dL Final   10/07/2020 1.1 0.6 - 1.3 mg/dL Final     Sodium   Date Value Ref Range Status   11/21/2023 137 135 - 145 mmol/L Final     Comment:     Reference intervals for this test were updated on 09/26/2023 to more accurately reflect our healthy population. There may be differences in the flagging of prior results with similar values performed with this method. Interpretation of those prior results can be made in the context of the updated reference intervals.    10/07/2020 142.0 137.3 - 146.3 mmol/L Final     Glucose   Date Value Ref Range Status   11/21/2023 194 (H) 70 - 99 mg/dL Final   04/28/2023 138 (H) 70 - 99 mg/dL Final   03/09/2022 153 (H) 70 - 99 mg/dL Final   01/27/2022 169 (H) 70 - 99 mg/dL Final    10/07/2020 77.0 60.0 - 99.0 mg/dL Final     Hemoglobin A1C   Date Value Ref Range Status   11/21/2023 7.5 (H) <5.7 % Final     Comment:     Normal <5.7%   Prediabetes 5.7-6.4%    Diabetes 6.5% or higher     Note: Adopted from ADA consensus guidelines.   04/28/2023 6.6 (H) 0.0 - 5.6 % Final     Comment:     Normal <5.7%   Prediabetes 5.7-6.4%    Diabetes 6.5% or higher     Note: Adopted from ADA consensus guidelines.   10/07/2020 6.8 (H) 4.1 - 5.7 % Final       Imaging:    I personally reviewed the Non invasive vascular labs and arterial duplex ultrasound from 1/11/2024 which shows non compressible right BRENNEN, normal left BRENNEN.  She has normal arterial waveforms throughout both sides without focal velocity elevation or stenosis by velocity criteria with the exception of left distal PT occlusion.    Impression/Shared Medical Decision Making:    #1- Mild asymptomatic peripheral arterial disease  #2- Left foot discoloration, not consistent with gangrene or ulceration  #3- Hypertension  #4- Hyperlipidemia  #5- Heart block status post pacemaker placement  #6- Diabetes mellitus  Ms. Lynn is a pleasant 83 year old female evaluated for a dark spot on her foot.  She has mild asymptomatic peripheral arterial disease with asymptomatic PT occlusion on that side, and palpable pulses in her feet.  We discussed the natural history and etiology of peripheral arterial disease as well as modifiable risk factors.  We discussed the low risk of amputation with claudication and asymptomatic disease as opposed to limb threatening ischemia.  We reviewed the guidelines for medical therapy and exercise therapy rather than surgical intervention for claudication and that often intervention carries more risk than benefit, particularly with the potential to increase risks of limb loss if interventions occlude.  It is my strong recommendation for optimal medical therapy with antiplatelet and statin therapy as well as smoking cessation in  smokers.  Discussed importance of walking programs.    Discussed warning signs including, but not limited to, acute limb ischemia, vascular lower extremity pain, motor or sensory dysfunction, chronic limb threatening ischemia, ischemic rest pain, and wounds.  If she experiences any of these, she has been advised to seek treatment and contact my team.    Ms. Lynn and her son are in agreement with this plan as outlined.    Recommendations/Plan:  Continue optimal medical therapy with aspirin and statin.    Will continue to avoid tobacco and nicotine products.  Recommended a walking program, although she is asymptomatic as opposed to claudication it would still be beneficial for her cardiovascular health and peripheral arterial disease. Example as below  Initially she will test walking distance to the point where she cannot walk any further without stopping and count this number of step as a baseline for exercise  she needs to have dedicated exercise time 3x per week  During these, she will do 3 walks where she walks 110-120% of his maximum baseline steps resting in between.  Every 3-4 weeks, or when discomfort with walking is decreasing, she will retest walking distance baseline to restart the process  she will keep a journal with his progress for a more objective measure  In setting of asymptomatic peripheral arterial disease she can follow up as needed.    Semaj Ulloa MD  Vascular & Endovascular Surgery      30 minutes spent on the day of encounter doing chart review from our system and care everywhere, history and exam, documentation, coordinating care, and further activities as noted with over half spent counseling.

## 2024-04-15 DIAGNOSIS — E11.59 TYPE 2 DIABETES MELLITUS WITH OTHER CIRCULATORY COMPLICATION, UNSPECIFIED WHETHER LONG TERM INSULIN USE (H): ICD-10-CM

## 2024-04-15 DIAGNOSIS — I10 ESSENTIAL HYPERTENSION: ICD-10-CM

## 2024-04-15 RX ORDER — LISINOPRIL/HYDROCHLOROTHIAZIDE 10-12.5 MG
1 TABLET ORAL DAILY
Qty: 30 TABLET | Refills: 0 | Status: SHIPPED | OUTPATIENT
Start: 2024-04-15 | End: 2024-05-14

## 2024-04-15 RX ORDER — ROSUVASTATIN CALCIUM 10 MG/1
10 TABLET, COATED ORAL DAILY
Qty: 30 TABLET | Refills: 0 | Status: SHIPPED | OUTPATIENT
Start: 2024-04-15 | End: 2024-05-14

## 2024-04-15 NOTE — TELEPHONE ENCOUNTER
Rosuvastatin (Crestor) 10 mg + Lisinopril-hydrochlorothiazide (Zestoretic) 10-12.5 mg    Last Office Visit: 11/17/23  [facility]ments: None    Medication last refilled:   1/19/24 #90 with 0 refill(s) - Rosuvastatin  1/19/24 #90 with 0 refill(s) - Zestoretic    Vital Signs Systolic Diastolic   2/21/24 160 75   4/28/23 186 92   10/28/22 151 83     Required labs per protocol:    LAB REF RANGE 4/28/23 11/21/23   Creatinine 0.67-1.17 mg/dL 1.57 High 1.72 High   GFR >60 mL/min/1.73m2  33 Low 29 Low   Potassium 3.4-5.3 mmol/L 4.1 4.0   Sodium 137.3-146.3 mmol/L 137 137   LDL < 100 mg/dL 6.6 High --     Medication is being filled for 1 time refill only due to:  Patient needs to be seen because due for a high blood pressure and a cholesterol follow up.    [employer] message sent to patient to call and schedule appointment.    Cheryl Richey, LORN, RN, CCM

## 2024-04-19 ENCOUNTER — TELEPHONE (OUTPATIENT)
Dept: FAMILY MEDICINE | Facility: CLINIC | Age: 84
End: 2024-04-19

## 2024-04-19 NOTE — TELEPHONE ENCOUNTER
Who is calling? Justin   What do they need? Referral to kidney specialist   Is this an insurance referral? No  Does caller need a call back? Yes   Additional Comments: Son would like to get a referral for his mom to see a kidney doctor before seeing Dr. Velazquez

## 2024-05-14 ENCOUNTER — OFFICE VISIT (OUTPATIENT)
Dept: FAMILY MEDICINE | Facility: CLINIC | Age: 84
End: 2024-05-14
Payer: MEDICARE

## 2024-05-14 ENCOUNTER — TELEPHONE (OUTPATIENT)
Dept: FAMILY MEDICINE | Facility: CLINIC | Age: 84
End: 2024-05-14

## 2024-05-14 VITALS
SYSTOLIC BLOOD PRESSURE: 174 MMHG | HEART RATE: 85 BPM | BODY MASS INDEX: 22.86 KG/M2 | WEIGHT: 121 LBS | TEMPERATURE: 97 F | OXYGEN SATURATION: 100 % | DIASTOLIC BLOOD PRESSURE: 74 MMHG

## 2024-05-14 DIAGNOSIS — I10 ESSENTIAL HYPERTENSION: ICD-10-CM

## 2024-05-14 DIAGNOSIS — E11.59 TYPE 2 DIABETES MELLITUS WITH OTHER CIRCULATORY COMPLICATION, UNSPECIFIED WHETHER LONG TERM INSULIN USE (H): ICD-10-CM

## 2024-05-14 RX ORDER — LISINOPRIL/HYDROCHLOROTHIAZIDE 10-12.5 MG
1 TABLET ORAL DAILY
Qty: 90 TABLET | Refills: 0 | Status: SHIPPED | OUTPATIENT
Start: 2024-05-14 | End: 2024-08-16

## 2024-05-14 RX ORDER — FLASH GLUCOSE SENSOR
KIT MISCELLANEOUS
Qty: 8 EACH | Refills: 3 | Status: SHIPPED | OUTPATIENT
Start: 2024-05-14 | End: 2024-05-21

## 2024-05-14 RX ORDER — ROSUVASTATIN CALCIUM 10 MG/1
10 TABLET, COATED ORAL DAILY
Qty: 90 TABLET | Refills: 3 | Status: SHIPPED | OUTPATIENT
Start: 2024-05-14 | End: 2024-08-27

## 2024-05-14 NOTE — TELEPHONE ENCOUNTER
Who is calling? Justin  Medication name:     Continuous Glucose Sensor (FREESTYLE ANURAG 14 DAY SENSOR) MISC     Is this a refill request? Yes  Have they contacted the pharmacy?  Yes  Pharmacy:   [facility] - [address] [address]  Phone: [phone] Fax: [phone]  Question/Concern: This was sent to [facility] pharmacy, needs to be sent to [facility].   Would patient like a call back? No

## 2024-05-14 NOTE — TELEPHONE ENCOUNTER
Lisinopril-hydrochlorothiazide (Zestoretic) 10-12.5 mg    Last Office Visit: 5/14/24  [facility] Appointments: None  Medication last refilled: 4/15/24 #30 with 0 refill(s)    Vital Signs Systolic Diastolic   5/14/24 174 74   2/21/24 160 74   1/3/24 160 88     Required labs per protocol:    LAB REF RANGE 4/28/23 11/21/23   GFR >60 mL/min/1.73m2  33 Low 29 Low   Creatinine 0.67-1.17 mg/dL 1.57 High 1.72 High   Potassium 3.4-5.3 mmol/L 4.1 4.0     Prescription approved per [facility] Refill Protocol.    Cheryl Richey, LORN, RN, CCM

## 2024-05-14 NOTE — PROGRESS NOTES
Assessment & Plan   Problem List Items Addressed This Visit          Endocrine    Type 2 diabetes mellitus with other circulatory complication, unspecified whether long term insulin use (H)    Relevant Medications    Continuous Glucose [facility]ensor ([facility]YLE ANURAG 14 DAY SENSOR[facility]    rosuvastatin (CRESTOR) 10 MG tablet       Circulatory    Essential hypertension      Encouraged home monitoring of BP and notify me if pressure are more elevated.     Ashley's son wonders if Ashley can get a new glucose meter. Suggested he call Ashley's insurance to see about when they can get a new meter    [place] is [place] about [place] there she struggled with heart arrhythmia and syncope. Advised Son that pacemaker should help with symptoms but he should also plan on having plenty of water on hand, air-conditioning in the car, and take plenty of breaks while driving.     24 minutes spent on the date of the encounter doing chart review, history and exam, documentation and further activities as noted.    Nicholas Velazquez MD  1:58 PM, May 14, 2024        Subjective   Ashley is a 83 year old, presenting for the following health issues:  Hypertension and Anxiety    Via the Health Maintenance questionnaire, the patient has reported the following services have been completed -Eye Exam, this information has been sent to the abstraction team.    HPI   # Hypertension   - Per Ashley's son, home pressures consistently wnls although they have not checked her home pressures recently  BP Readings from Last 5 Encounters:   05/14/24 (!) 174/74   02/21/24 (!) 160/75   01/03/24 (!) 160/88   11/17/23 (!) 164/78   04/28/23 (!) 194/94     Creatinine   Date Value Ref Range Status   11/21/2023 1.72 (H) 0.51 - 0.95 mg/dL Final   04/28/2023 1.57 (H) 0.51 - 0.95 mg/dL Final   10/07/2020 1.1 0.6 - 1.3 mg/dL Final     Sodium   Date Value Ref Range Status   11/21/2023 137 135 - 145 mmol/L Final     Comment:     Reference  intervals for this test were updated on 09/26/2023 to more accurately reflect our healthy population. There may be differences in the flagging of prior results with similar values performed with this method. Interpretation of those prior results can be made in the context of the updated reference intervals.    10/07/2020 142.0 137.3 - 146.3 mmol/L Final     Potassium   Date Value Ref Range Status   11/21/2023 4.0 3.4 - 5.3 mmol/L Final   03/09/2022 4.5 3.4 - 5.3 mmol/L Final   10/07/2020 4.3 3.4 - 5.3 mmol/L Final     - Current Regimen: lisinopril/hydrochlorothiazide 10/12.5mg  - Missed doses?: No  - Headache, dizziness, blurry vision, chest pain, dyspnea, orthopnea, PND, lower extremity edema?: No    Review of Systems  Constitutional, HEENT, cardiovascular, pulmonary, gi and gu systems are negative, except as otherwise noted.      Objective    BP (!) 174/74   Pulse 85   Temp 97  F (36.1  C)   Wt 54.9 kg (121 lb)   SpO2 100%   BMI 22.86 kg/m    Body mass index is 22.86 kg/m .  Physical Exam   GENERAL: alert and no distress  NECK: no adenopathy, no asymmetry, masses, or scars  RESP: lungs clear to auscultation - no rales, rhonchi or wheezes  CV: regular rate and rhythm, normal S1 S2, no S3 or S4, no murmur, click or rub, no peripheral edema  ABDOMEN: soft, nontender, no hepatosplenomegaly, no masses and bowel sounds normal  MS: no gross musculoskeletal defects noted, no edema          Signed Electronically by: Nicholas Velazquez MD

## 2024-05-21 DIAGNOSIS — E11.59 TYPE 2 DIABETES MELLITUS WITH OTHER CIRCULATORY COMPLICATION, UNSPECIFIED WHETHER LONG TERM INSULIN USE (H): ICD-10-CM

## 2024-05-21 RX ORDER — FLASH GLUCOSE SENSOR
KIT MISCELLANEOUS
Qty: 2 EACH | Refills: 0 | Status: SHIPPED | OUTPATIENT
Start: 2024-05-21 | End: 2024-08-27

## 2024-05-21 NOTE — TELEPHONE ENCOUNTER
Pt needs a new script because the sensor fell off before it was supposed to.    Son asking if we could resend to [facility] by noon today because they are moving on to [place] from [place] after today.      Roro

## 2024-05-27 ENCOUNTER — ANCILLARY PROCEDURE (OUTPATIENT)
Dept: CARDIOLOGY | Facility: CLINIC | Age: 84
End: 2024-05-27
Attending: INTERNAL MEDICINE
Payer: MEDICARE

## 2024-05-27 DIAGNOSIS — I44.30 ATRIOVENTRICULAR BLOCK: ICD-10-CM

## 2024-05-27 PROCEDURE — 93296 REM INTERROG EVL PM/IDS: CPT

## 2024-05-27 PROCEDURE — 93294 REM INTERROG EVL PM/LDLS PM: CPT | Performed by: INTERNAL MEDICINE

## 2024-06-13 NOTE — CONFIDENTIAL NOTE
DIAGNOSIS:  Chronic kidney disease    DATE RECEIVED: 08.19.2024    NOTES STATUS DETAILS   OFFICE NOTE from referring provider Internal 04.15.2024 Nicholas Velazquez MD    MEDICATION LIST Internal    LABS     BMP Internal 11.21.2024    UA Internal 07.28.2022   URINE PROTEIN Internal 07.28.2022

## 2024-06-24 LAB
MDC_IDC_LEAD_CONNECTION_STATUS: NORMAL
MDC_IDC_LEAD_CONNECTION_STATUS: NORMAL
MDC_IDC_LEAD_IMPLANT_DT: NORMAL
MDC_IDC_LEAD_IMPLANT_DT: NORMAL
MDC_IDC_LEAD_LOCATION: NORMAL
MDC_IDC_LEAD_LOCATION: NORMAL
MDC_IDC_LEAD_LOCATION_DETAIL_1: NORMAL
MDC_IDC_LEAD_LOCATION_DETAIL_1: NORMAL
MDC_IDC_LEAD_MFG: NORMAL
MDC_IDC_LEAD_MFG: NORMAL
MDC_IDC_LEAD_MODEL: NORMAL
MDC_IDC_LEAD_MODEL: NORMAL
MDC_IDC_LEAD_POLARITY_TYPE: NORMAL
MDC_IDC_LEAD_POLARITY_TYPE: NORMAL
MDC_IDC_LEAD_SERIAL: NORMAL
MDC_IDC_LEAD_SERIAL: NORMAL
MDC_IDC_LEAD_SPECIAL_FUNCTION: NORMAL
MDC_IDC_LEAD_SPECIAL_FUNCTION: NORMAL
MDC_IDC_MSMT_BATTERY_DTM: NORMAL
MDC_IDC_MSMT_BATTERY_REMAINING_LONGEVITY: 117 MO
MDC_IDC_MSMT_BATTERY_RRT_TRIGGER: 2.62
MDC_IDC_MSMT_BATTERY_STATUS: NORMAL
MDC_IDC_MSMT_BATTERY_VOLTAGE: 3 V
MDC_IDC_MSMT_LEADCHNL_RA_IMPEDANCE_VALUE: 494 OHM
MDC_IDC_MSMT_LEADCHNL_RA_IMPEDANCE_VALUE: 532 OHM
MDC_IDC_MSMT_LEADCHNL_RA_PACING_THRESHOLD_AMPLITUDE: 0.38 V
MDC_IDC_MSMT_LEADCHNL_RA_PACING_THRESHOLD_PULSEWIDTH: 0.4 MS
MDC_IDC_MSMT_LEADCHNL_RA_SENSING_INTR_AMPL: 4 MV
MDC_IDC_MSMT_LEADCHNL_RV_IMPEDANCE_VALUE: 342 OHM
MDC_IDC_MSMT_LEADCHNL_RV_IMPEDANCE_VALUE: 456 OHM
MDC_IDC_MSMT_LEADCHNL_RV_PACING_THRESHOLD_AMPLITUDE: 0.75 V
MDC_IDC_MSMT_LEADCHNL_RV_PACING_THRESHOLD_PULSEWIDTH: 0.4 MS
MDC_IDC_MSMT_LEADCHNL_RV_SENSING_INTR_AMPL: 4.5 MV
MDC_IDC_PG_IMPLANT_DTM: NORMAL
MDC_IDC_PG_MFG: NORMAL
MDC_IDC_PG_MODEL: NORMAL
MDC_IDC_PG_SERIAL: NORMAL
MDC_IDC_PG_TYPE: NORMAL
MDC_IDC_SESS_CLINIC_NAME: NORMAL
MDC_IDC_SESS_DTM: NORMAL
MDC_IDC_SESS_TYPE: NORMAL
MDC_IDC_SET_BRADY_AT_MODE_SWITCH_RATE: 171 {BEATS}/MIN
MDC_IDC_SET_BRADY_HYSTRATE: NORMAL
MDC_IDC_SET_BRADY_LOWRATE: 60 {BEATS}/MIN
MDC_IDC_SET_BRADY_MAX_SENSOR_RATE: 120 {BEATS}/MIN
MDC_IDC_SET_BRADY_MAX_TRACKING_RATE: 120 {BEATS}/MIN
MDC_IDC_SET_BRADY_MODE: NORMAL
MDC_IDC_SET_BRADY_PAV_DELAY_LOW: 220 MS
MDC_IDC_SET_BRADY_SAV_DELAY_LOW: 200 MS
MDC_IDC_SET_LEADCHNL_RA_PACING_AMPLITUDE: 1.5 V
MDC_IDC_SET_LEADCHNL_RA_PACING_ANODE_ELECTRODE_1: NORMAL
MDC_IDC_SET_LEADCHNL_RA_PACING_ANODE_LOCATION_1: NORMAL
MDC_IDC_SET_LEADCHNL_RA_PACING_CAPTURE_MODE: NORMAL
MDC_IDC_SET_LEADCHNL_RA_PACING_CATHODE_ELECTRODE_1: NORMAL
MDC_IDC_SET_LEADCHNL_RA_PACING_CATHODE_LOCATION_1: NORMAL
MDC_IDC_SET_LEADCHNL_RA_PACING_POLARITY: NORMAL
MDC_IDC_SET_LEADCHNL_RA_PACING_PULSEWIDTH: 0.4 MS
MDC_IDC_SET_LEADCHNL_RA_SENSING_ANODE_ELECTRODE_1: NORMAL
MDC_IDC_SET_LEADCHNL_RA_SENSING_ANODE_LOCATION_1: NORMAL
MDC_IDC_SET_LEADCHNL_RA_SENSING_CATHODE_ELECTRODE_1: NORMAL
MDC_IDC_SET_LEADCHNL_RA_SENSING_CATHODE_LOCATION_1: NORMAL
MDC_IDC_SET_LEADCHNL_RA_SENSING_POLARITY: NORMAL
MDC_IDC_SET_LEADCHNL_RA_SENSING_SENSITIVITY: 0.3 MV
MDC_IDC_SET_LEADCHNL_RV_PACING_AMPLITUDE: 1.5 V
MDC_IDC_SET_LEADCHNL_RV_PACING_ANODE_ELECTRODE_1: NORMAL
MDC_IDC_SET_LEADCHNL_RV_PACING_ANODE_LOCATION_1: NORMAL
MDC_IDC_SET_LEADCHNL_RV_PACING_CAPTURE_MODE: NORMAL
MDC_IDC_SET_LEADCHNL_RV_PACING_CATHODE_ELECTRODE_1: NORMAL
MDC_IDC_SET_LEADCHNL_RV_PACING_CATHODE_LOCATION_1: NORMAL
MDC_IDC_SET_LEADCHNL_RV_PACING_POLARITY: NORMAL
MDC_IDC_SET_LEADCHNL_RV_PACING_PULSEWIDTH: 0.4 MS
MDC_IDC_SET_LEADCHNL_RV_SENSING_ANODE_ELECTRODE_1: NORMAL
MDC_IDC_SET_LEADCHNL_RV_SENSING_ANODE_LOCATION_1: NORMAL
MDC_IDC_SET_LEADCHNL_RV_SENSING_CATHODE_ELECTRODE_1: NORMAL
MDC_IDC_SET_LEADCHNL_RV_SENSING_CATHODE_LOCATION_1: NORMAL
MDC_IDC_SET_LEADCHNL_RV_SENSING_POLARITY: NORMAL
MDC_IDC_SET_LEADCHNL_RV_SENSING_SENSITIVITY: 0.9 MV
MDC_IDC_SET_ZONE_DETECTION_INTERVAL: 350 MS
MDC_IDC_SET_ZONE_DETECTION_INTERVAL: 400 MS
MDC_IDC_SET_ZONE_STATUS: NORMAL
MDC_IDC_SET_ZONE_STATUS: NORMAL
MDC_IDC_SET_ZONE_TYPE: NORMAL
MDC_IDC_SET_ZONE_VENDOR_TYPE: NORMAL
MDC_IDC_STAT_AT_BURDEN_PERCENT: 0.1 %
MDC_IDC_STAT_AT_DTM_END: NORMAL
MDC_IDC_STAT_AT_DTM_START: NORMAL
MDC_IDC_STAT_BRADY_AP_VP_PERCENT: 31.09 %
MDC_IDC_STAT_BRADY_AP_VS_PERCENT: 0 %
MDC_IDC_STAT_BRADY_AS_VP_PERCENT: 68.84 %
MDC_IDC_STAT_BRADY_AS_VS_PERCENT: 0.07 %
MDC_IDC_STAT_BRADY_DTM_END: NORMAL
MDC_IDC_STAT_BRADY_DTM_START: NORMAL
MDC_IDC_STAT_BRADY_RA_PERCENT_PACED: 31.07 %
MDC_IDC_STAT_BRADY_RV_PERCENT_PACED: 99.93 %
MDC_IDC_STAT_EPISODE_RECENT_COUNT: 0
MDC_IDC_STAT_EPISODE_RECENT_COUNT_DTM_END: NORMAL
MDC_IDC_STAT_EPISODE_RECENT_COUNT_DTM_START: NORMAL
MDC_IDC_STAT_EPISODE_TOTAL_COUNT: 0
MDC_IDC_STAT_EPISODE_TOTAL_COUNT: 265
MDC_IDC_STAT_EPISODE_TOTAL_COUNT: 3
MDC_IDC_STAT_EPISODE_TOTAL_COUNT_DTM_END: NORMAL
MDC_IDC_STAT_EPISODE_TOTAL_COUNT_DTM_START: NORMAL
MDC_IDC_STAT_EPISODE_TYPE: NORMAL
MDC_IDC_STAT_TACHYTHERAPY_RECENT_DTM_END: NORMAL
MDC_IDC_STAT_TACHYTHERAPY_RECENT_DTM_START: NORMAL
MDC_IDC_STAT_TACHYTHERAPY_TOTAL_DTM_END: NORMAL
MDC_IDC_STAT_TACHYTHERAPY_TOTAL_DTM_START: NORMAL

## 2024-07-12 NOTE — PROGRESS NOTES
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   She was referred to establish a cardiology care.  She has been doing well.  She denied any chest pain, SOB, palpitation or dizsiness.    PAST MEDICAL HISTORY:  Past Medical History:   Diagnosis Date     Cardiac pacemaker in situ 10/07/2020     Type 2 diabetes mellitus without complication, without long-term current use of insulin (H) 10/07/2020       CURRENT MEDICATIONS:  Current Outpatient Medications   Medication Sig Dispense Refill     amLODIPine (NORVASC) 10 MG tablet Take 1 tablet (10 mg) by mouth daily 90 tablet 2     aspirin (ASA) 81 MG chewable tablet Take 1 tablet (81 mg) by mouth daily 90 tablet 3     Continuous Blood Gluc  (FREESTYLE ANURAG 14 DAY READER) STEVIE Use to read blood sugars as per 's instructions. 1 each 0     Continuous Blood Gluc Sensor (FREESTYLE ANURAG 14 DAY SENSOR) MISC Change every 14 days. 2 each 6     famotidine (PEPCID) 20 MG tablet Take 20 mg by mouth 2 times daily       lisinopril (ZESTRIL) 20 MG tablet Take 1 tablet (20 mg) by mouth daily 90 tablet 2     Multiple Vitamins-Minerals (MULTIVITAMIN ADULT PO)        simvastatin (ZOCOR) 40 MG tablet Take 1 tablet (40 mg) by mouth At Bedtime 90 tablet 3       PAST SURGICAL HISTORY:  Past Surgical History:   Procedure Laterality Date     IMPLANT PACEMAKER         ALLERGIES:   No Known Allergies    FAMILY HISTORY:  - Premature coronary artery disease  - Atrial fibrillation  - Sudden cardiac death     SOCIAL HISTORY:  Social History     Tobacco Use     Smoking status: Never Smoker     Smokeless tobacco: Never Used   Substance Use Topics     Alcohol use: Never     Drug use: None       ROS:   Answers for HPI/ROS submitted by the patient on 9/27/2021 were reviewed.  Constitutional: No fever, chills, or sweats. Weight stable.   ENT: No visual disturbance, ear ache, epistaxis,  "sore throat.   Cardiovascular: As per HPI.   Respiratory: No cough, hemoptysis.    GI: No nausea, vomiting, hematemesis, melena, or hematochezia.   : No hematuria.   Integument: Negative.   Psychiatric: Negative.   Hematologic:  Easy bruising, no easy bleeding.  Neuro: Negative.   Endocrinology: No significant heat or cold intolerance   Musculoskeletal: No myalgia.    Exam:  BP (!) 147/78 (BP Location: Left arm, Patient Position: Chair, Cuff Size: Adult Regular)   Pulse 95   Ht 1.516 m (4' 11.69\")   Wt 57 kg (125 lb 11.2 oz)   SpO2 99%   BMI 24.81 kg/m    GENERAL APPEARANCE: healthy, alert and no distress  HEENT: no icterus, no xanthelasmas, normal pupil size and reaction, normal palate, mucosa moist, no central cyanosis  NECK: no adenopathy, no asymmetry, masses, or scars, thyroid normal to palpation and no bruits, JVP not elevated  RESPIRATORY: lungs clear to auscultation - no rales, rhonchi or wheezes, no use of accessory muscles, no retractions, respirations are unlabored, normal respiratory rate  CARDIOVASCULAR: regular rhythm, normal S1 with physiologic split S2, no S3 or S4 and no murmur, click or rub, precordium quiet with normal PMI.  ABDOMEN: soft, non tender, without hepatosplenomegaly, no masses palpable, bowel sounds normal, aorta not enlarged by palpation, no abdominal bruits  EXTREMITIES: peripheral pulses normal, no edema, no bruits  NEURO: alert and oriented to person/place/time, normal speech, gait and affect  VASC: Radial, femoral, dorsalis pedis and posterior tibialis pulses are normal in volumes and symmetric bilaterally. No bruits are heard.  SKIN: no ecchymoses, no rashes    Labs:  CBC RESULTS:   No results found for: WBC, RBC, HGB, HCT, MCV, MCH, MCHC, RDW, PLT    BMP RESULTS:  Lab Results   Component Value Date    .0 10/07/2020    POTASSIUM 4.3 10/07/2020    CHLORIDE 102.0 10/07/2020    CO2 28.0 10/07/2020    GLC 77.0 10/07/2020    BUN 14.0 10/07/2020    CR 1.1 10/07/2020    YOGI " 9.1 10/07/2020        INR RESULTS:  No results found for: INR    Procedures:  Pacemaker interrogation on 9/2/2021: Reviewed.  Remote pacemaker transmission received and reviewed. Device transmission sent per MD orders.     Device: Medtronic [id number] [place] XT DR CONTRERAS  Normal Device Function.  Mode: DDD  bpm  AP: 3.1%  : 5.8%  Presenting EGM: AS/VS @ 90 bpm  Short V-V intervals: 0  Lead Trends Appear Stable  Estimated battery longevity to RRT = 13.1 years.   Atrial Arrhythmia: 18 AT episodes lasting up to 3.5 minutes.  AF [place]: <0.1%  Anticoagulant: None  Ventricular Arrhythmia: None  Pt Notified of Transmission Results: My Chart. Dr. العراقي had previously asked pt to f/u with EP. She has been reminded to make that appointment.     Plan: Send a remote transmission in 3 months.  NORMAN Vo RN     Remote pacemaker transmission     I have reviewed and interpreted the device interrogation, settings, programming and nurse's summary. The device is functioning within normal device parameters. I agree with the current findings, assessment and plan.    Assessment and Plan:  # DM II  # HTN Well controlled on Norvasc 10 mg daily.  # Advanced AV block s/p MDT dual chamber pacemaker placement on 9/5/2020.  She has been doing well and her pacemaker has been functioning appropriately.  I advised her to continue the same medication.  I will see her back in 6 months.      I spent a total of 30 min today to review the records, see the patient, and complete the documents.    CC  Patient Care Team:  Nicholas Velazquez MD as PCP - General (Family Practice)  Marisela Campoverde RD as Diabetes Educator (Dietitian, Registered)  Anurag العراقي MD as Assigned Heart and Vascular Provider  Nicholas Velazquez MD as Assigned PCP  Eileen Hilton MD as Cardiologist (Cardiovascular Disease)  ANURAG العراقي    Answers for HPI/ROS submitted by the patient on 9/27/2021  General Symptoms: Yes  Skin Symptoms: Yes  HENT Symptoms: Yes  EYE SYMPTOMS:  No  HEART SYMPTOMS: No  LUNG SYMPTOMS: No  INTESTINAL SYMPTOMS: Yes  URINARY SYMPTOMS: Yes  GYNECOLOGIC SYMPTOMS: No  BREAST SYMPTOMS: No  SKELETAL SYMPTOMS: Yes  BLOOD SYMPTOMS: No  NERVOUS SYSTEM SYMPTOMS: No  MENTAL HEALTH SYMPTOMS: Yes  Ear pain: No  Ear discharge: No  Hearing loss: No  Tinnitus: No  Nosebleeds: No  Congestion: No  Sinus pain: No  Trouble swallowing: No   Voice hoarseness: No  Mouth sores: No  Sore throat: No  Tooth pain: Yes  Gum tenderness: Yes  Bleeding gums: Yes  Change in taste: No  Change in sense of smell: No  Dry mouth: No  Hearing aid used: No  Neck lump: Yes  Fever: No  Loss of appetite: No  Weight loss: No  Weight gain: No  Fatigue: No  Night sweats: Yes  Chills: No  Increased stress: No  Excessive hunger: No  Excessive thirst: No  Feeling hot or cold when others believe the temperature is normal: Yes  Loss of height: No  Post-operative complications: No  Surgical site pain: No  Hallucinations: No  Change in or Loss of Energy: No  Hyperactivity: No  Confusion: Yes  Changes in hair: Yes  Changes in moles/birth marks: No  Itching: No  Rashes: No  Changes in nails: No  Acne: No  Hair in places you don't want it: No  Change in facial hair: No  Warts: No  Non-healing sores: No  Scarring: No  Flaking of skin: Yes  Color changes of hands/feet in cold : No  Sun sensitivity: No  Skin thickening: No  Heart burn or indigestion: No  Nausea: No  Vomiting: No  Abdominal pain: No  Bloating: No  Constipation: Yes  Diarrhea: No  Blood in stool: No  Black stools: No  Rectal or Anal pain: No  Fecal incontinence: No  Yellowing of skin or eyes: No  Vomit with blood: No  Change in stools: No  Trouble holding urine or incontinence: No  Pain or burning: No  Trouble starting or stopping: No  Increased frequency of urination: No  Blood in urine: No  Decreased frequency of urination: No  Frequent nighttime urination: No  Flank pain: No  Difficulty emptying bladder: No  Back pain: No  Muscle aches: No  Neck  pain: Yes  Swollen joints: No  Joint pain: No  Bone pain: No  Muscle cramps: No  Muscle weakness: No  Joint stiffness: No  Bone fracture: No  Nervous or Anxious: Yes  Depression: Yes  Trouble sleeping: No  Trouble thinking or concentrating: Yes  Mood changes: Yes  Panic attacks: No       PAST MEDICAL HISTORY:  Aortic stenosis     DM (diabetes mellitus)     HTN (hypertension)     Peripheral vascular disease

## 2024-07-14 ENCOUNTER — HEALTH MAINTENANCE LETTER (OUTPATIENT)
Age: 84
End: 2024-07-14

## 2024-08-09 DIAGNOSIS — N18.30 CHRONIC KIDNEY DISEASE (CKD), ACTIVE MEDICAL MANAGEMENT WITHOUT DIALYSIS, STAGE 3 (MODERATE) (H): Primary | ICD-10-CM

## 2024-08-16 DIAGNOSIS — I10 ESSENTIAL HYPERTENSION: ICD-10-CM

## 2024-08-16 RX ORDER — LISINOPRIL/HYDROCHLOROTHIAZIDE 10-12.5 MG
1 TABLET ORAL DAILY
Qty: 90 TABLET | Refills: 0 | Status: SHIPPED | OUTPATIENT
Start: 2024-08-16 | End: 2024-08-27

## 2024-08-16 NOTE — TELEPHONE ENCOUNTER
Medication requested: lisinopril-hydrochlorothiazide (ZESTORETIC) 10-12.5 MG tablet   Last office visit: 5/14/24  [facility] appointments: none  Medication last refilled: 5/14/24; 90 + 0 refills  Last qualifying labs:   BP Readings from Last 3 Encounters:   05/14/24 (!) 174/74   02/21/24 (!) 160/75   01/03/24 (!) 160/88     Component      Latest Ref Rng 11/21/2023  4:05 PM   Sodium      135 - 145 mmol/L 137    Potassium      3.4 - 5.3 mmol/L 4.0      Component      Latest Ref Rng 11/21/2023  4:05 PM   GFR Estimate      >60 mL/min/1.73m2 29 (L)       Routing refill request to provider for review/approval because:  Labs out of range:  GFR    Pt has upcoming appt with nephrology on 8/19.    Jeffrey ROPER, RN  08/16/24 2:14 PM

## 2024-08-16 NOTE — TELEPHONE ENCOUNTER
Med refilled as noted.     Ashley was seen today for refill request.    Diagnoses and all orders for this visit:    Essential hypertension  -     lisinopril-hydrochlorothiazide (ZESTORETIC) 10-12.5 MG tablet; Take 1 tablet by mouth daily      Nicholas Velazquez MD  3:26 PM, August 16, 2024

## 2024-08-19 ENCOUNTER — OFFICE VISIT (OUTPATIENT)
Dept: NEPHROLOGY | Facility: CLINIC | Age: 84
End: 2024-08-19
Attending: FAMILY MEDICINE
Payer: MEDICARE

## 2024-08-19 ENCOUNTER — PRE VISIT (OUTPATIENT)
Dept: NEPHROLOGY | Facility: CLINIC | Age: 84
End: 2024-08-19

## 2024-08-19 ENCOUNTER — LAB (OUTPATIENT)
Dept: LAB | Facility: CLINIC | Age: 84
End: 2024-08-19
Payer: MEDICARE

## 2024-08-19 VITALS
OXYGEN SATURATION: 100 % | BODY MASS INDEX: 22.26 KG/M2 | TEMPERATURE: 98.8 F | DIASTOLIC BLOOD PRESSURE: 68 MMHG | HEART RATE: 88 BPM | SYSTOLIC BLOOD PRESSURE: 188 MMHG | WEIGHT: 117.8 LBS

## 2024-08-19 DIAGNOSIS — E11.59 TYPE 2 DIABETES MELLITUS WITH OTHER CIRCULATORY COMPLICATION, UNSPECIFIED WHETHER LONG TERM INSULIN USE (H): ICD-10-CM

## 2024-08-19 DIAGNOSIS — N18.30 CHRONIC KIDNEY DISEASE (CKD), ACTIVE MEDICAL MANAGEMENT WITHOUT DIALYSIS, STAGE 3 (MODERATE) (H): Primary | ICD-10-CM

## 2024-08-19 DIAGNOSIS — N18.30 CHRONIC KIDNEY DISEASE (CKD), ACTIVE MEDICAL MANAGEMENT WITHOUT DIALYSIS, STAGE 3 (MODERATE) (H): ICD-10-CM

## 2024-08-19 DIAGNOSIS — I10 UNCONTROLLED HYPERTENSION: ICD-10-CM

## 2024-08-19 LAB
ALBUMIN MFR UR ELPH: 6.2 MG/DL
ALBUMIN SERPL BCG-MCNC: 4.1 G/DL (ref 3.5–5.2)
ALBUMIN UR-MCNC: NEGATIVE MG/DL
ALP SERPL-CCNC: 76 U/L (ref 40–150)
ALT SERPL W P-5'-P-CCNC: 24 U/L (ref 0–50)
ANION GAP SERPL CALCULATED.3IONS-SCNC: 10 MMOL/L (ref 7–15)
APPEARANCE UR: CLEAR
AST SERPL W P-5'-P-CCNC: 29 U/L (ref 0–45)
BILIRUB SERPL-MCNC: 0.3 MG/DL
BILIRUB UR QL STRIP: NEGATIVE
BUN SERPL-MCNC: 31.2 MG/DL (ref 8–23)
CALCIUM SERPL-MCNC: 9.2 MG/DL (ref 8.8–10.4)
CHLORIDE SERPL-SCNC: 101 MMOL/L (ref 98–107)
COLOR UR AUTO: NORMAL
CREAT SERPL-MCNC: 1.26 MG/DL (ref 0.51–0.95)
CREAT UR-MCNC: 42 MG/DL
EGFRCR SERPLBLD CKD-EPI 2021: 42 ML/MIN/1.73M2
ERYTHROCYTE [DISTWIDTH] IN BLOOD BY AUTOMATED COUNT: 14.7 % (ref 10–15)
FASTING STATUS PATIENT QL REPORTED: NO
FASTING STATUS PATIENT QL REPORTED: NO
GLUCOSE SERPL-MCNC: 125 MG/DL (ref 70–99)
GLUCOSE SERPL-MCNC: 125 MG/DL (ref 70–99)
GLUCOSE UR STRIP-MCNC: NEGATIVE MG/DL
HBA1C MFR BLD: 6.9 %
HCO3 SERPL-SCNC: 27 MMOL/L (ref 22–29)
HCT VFR BLD AUTO: 34.9 % (ref 35–47)
HGB BLD-MCNC: 11.4 G/DL (ref 11.7–15.7)
HGB UR QL STRIP: NEGATIVE
KETONES UR STRIP-MCNC: NEGATIVE MG/DL
LEUKOCYTE ESTERASE UR QL STRIP: NEGATIVE
MCH RBC QN AUTO: 28.1 PG (ref 26.5–33)
MCHC RBC AUTO-ENTMCNC: 32.7 G/DL (ref 31.5–36.5)
MCV RBC AUTO: 86 FL (ref 78–100)
NITRATE UR QL: NEGATIVE
PH UR STRIP: 5.5 [PH] (ref 5–7)
PHOSPHATE SERPL-MCNC: 2.5 MG/DL (ref 2.5–4.5)
PLATELET # BLD AUTO: 279 10E3/UL (ref 150–450)
POTASSIUM SERPL-SCNC: 4.2 MMOL/L (ref 3.4–5.3)
PROT SERPL-MCNC: 7.6 G/DL (ref 6.4–8.3)
PROT/CREAT 24H UR: 0.15 MG/MG CR (ref 0–0.2)
PTH-INTACT SERPL-MCNC: 39 PG/ML (ref 15–65)
RBC # BLD AUTO: 4.06 10E6/UL (ref 3.8–5.2)
RBC URINE: <1 /HPF
SODIUM SERPL-SCNC: 138 MMOL/L (ref 135–145)
SP GR UR STRIP: 1.01 (ref 1–1.03)
UROBILINOGEN UR STRIP-MCNC: NORMAL MG/DL
VIT D+METAB SERPL-MCNC: 67 NG/ML (ref 20–50)
WBC # BLD AUTO: 10.8 10E3/UL (ref 4–11)
WBC URINE: <1 /HPF

## 2024-08-19 PROCEDURE — 99205 OFFICE O/P NEW HI 60 MIN: CPT | Performed by: INTERNAL MEDICINE

## 2024-08-19 PROCEDURE — 82306 VITAMIN D 25 HYDROXY: CPT | Performed by: INTERNAL MEDICINE

## 2024-08-19 PROCEDURE — 84156 ASSAY OF PROTEIN URINE: CPT | Performed by: PATHOLOGY

## 2024-08-19 PROCEDURE — 99000 SPECIMEN HANDLING OFFICE-LAB: CPT | Performed by: PATHOLOGY

## 2024-08-19 PROCEDURE — 81001 URINALYSIS AUTO W/SCOPE: CPT | Performed by: PATHOLOGY

## 2024-08-19 PROCEDURE — 83036 HEMOGLOBIN GLYCOSYLATED A1C: CPT | Performed by: INTERNAL MEDICINE

## 2024-08-19 PROCEDURE — 36415 COLL VENOUS BLD VENIPUNCTURE: CPT | Performed by: PATHOLOGY

## 2024-08-19 PROCEDURE — 80053 COMPREHEN METABOLIC PANEL: CPT | Performed by: PATHOLOGY

## 2024-08-19 PROCEDURE — 85027 COMPLETE CBC AUTOMATED: CPT | Performed by: PATHOLOGY

## 2024-08-19 PROCEDURE — 84100 ASSAY OF PHOSPHORUS: CPT | Performed by: PATHOLOGY

## 2024-08-19 PROCEDURE — 83970 ASSAY OF PARATHORMONE: CPT | Performed by: PATHOLOGY

## 2024-08-19 PROCEDURE — G0463 HOSPITAL OUTPT CLINIC VISIT: HCPCS | Performed by: INTERNAL MEDICINE

## 2024-08-19 ASSESSMENT — PAIN SCALES - GENERAL: PAINLEVEL: NO PAIN (0)

## 2024-08-19 NOTE — LETTER
8/19/2024       RE: Ashley Lynn  [address] [facility] [address]     Dear Colleague,    Thank you for referring your patient, Ashley Lynn, to the [facility] NEPHROLOGY [facility]INIC [place] at [facility]. Please see a copy of my visit note below.    Nephrology Clinic    Ashley Lynn MRN:[id number] YOB: 1940  Date of Service: 08/19/2024  Primary care provider: Nicholas Velazquez  Requesting physician: Nicholas Velazquez      REASON FOR CONSULT: CKD    HISTORY OF PRESENT ILLNESS:   Ashley Lynn is a 83 year old female who presents for evaluation of CKD.  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  From a renal standpoint, it is unclear when CKD started as her kidney function has been infrequently checked. Her creatinine level was  1.1 mg/dL in 2020, not measured in 2021 two measures are available in 2022 at 1.89 and 1.55 mg/dL. It was measured twice in 2023 and was 1.57 mg/dL in April 2023 and 1.72 mg/dL in November 2023 and hasn't been measured since then. A urinalysis done in July 2022 shows no evidence of albuminuria, hematuria or leucocyturia. There is no renal imaging available. For hypertension, the patient is lisinopril-hydrochlorothiazide 10-12.5 mg daily. The blood pressure has been uncontrolled in clinic. There are no available measures at home.  The diabetes has been in general well controlled with diet only with the last Hba1c at 7.5 in November 2023.   The following portions of the patient's history were reviewed and updated as appropriate: allergies, current medications, past family history, past medical history, past social history, past surgical history and problem list.    PAST MEDICAL HISTORY:  Past Medical  History:   Diagnosis Date     Cardiac pacemaker in situ 10/07/2020     Type 2 diabetes mellitus without complication, without long-term current use of insulin (H) 10/07/2020     PAST SURGICAL HISTORY:  Past Surgical History:   Procedure Laterality Date     IMPLANT PACEMAKER       MEDICATIONS:  Prescription Medications as of 8/19/2024         Rx Number Disp Refills Start End Last Dispensed Date Next Fill Date Owning Pharmacy    Continuous Blood Gluc  (FREESTYLE ANURAG 14 DAY READER) STEVIE  1 each 0 10/13/2020 --   [facility]/pharmacy #[id number] [address]    Sig: Use to read blood sugars as per 's instructions.    Class: E-Prescribe    Continuous Glucose Sensor (FREESTYLE ANURAG 14 DAY SENSOR) MISC  2 each 0 5/21/2024 --   [id number] [place] [address]    Sig: Change every 14 days.    Class: E-Prescribe    lisinopril-hydrochlorothiazide (ZESTORETIC) 10-12.5 MG tablet  90 tablet 0 8/16/2024 --   [facility] [address] [place] [address]    Sig: Take 1 tablet by mouth daily    Class: E-Prescribe    Route: Oral    Multiple Vitamins-Minerals (MULTIVITAMIN ADULT PO)  -- --  --   [facility]/pharmacy #[address] [place] [address]    Class: Historical    Route: Oral    rosuvastatin (CRESTOR) 10 MG tablet  90 tablet 3 5/14/2024 --   [address]    Sig: Take 1 tablet (10 mg) by mouth daily    Class: E-Prescribe    Route: Oral           ALLERGIES:    No Known Allergies  REVIEW OF SYSTEMS:    A comprehensive review of systems was performed and found to be negative except as described here or above.  SOCIAL HISTORY:   Social History     Socioeconomic History     Marital status:      Spouse name: Not on file     Number of children: Not on file     Years of education: Not on file     Highest education level: Not on file    Occupational History     Not on file   Tobacco Use     Smoking status: Never     Passive exposure: Never (per pt)     Smokeless tobacco: Never   Substance and Sexual Activity     Alcohol use: Never     Drug use: Not on file     Sexual activity: Not on file   Other Topics Concern     Not on file   Social History Narrative     Not on file     Social Determinants of Health     Financial Resource Strain: Low Risk  (5/14/2024)    Financial Resource Strain      Within the past 12 months, have you or your family members you live with been unable to get utilities (heat, electricity) when it was really needed?: No   Food Insecurity: Low Risk  (5/14/2024)    Food Insecurity      Within the past 12 months, did you worry that your food would run out before you got money to buy more?: No      Within the past 12 months, did the food you bought just not last and you didn t have money to get more?: No   Transportation Needs: Low Risk  (5/14/2024)    Transportation Needs      Within the past 12 months, has lack of transportation kept you from medical appointments, getting your medicines, non-medical meetings or appointments, work, or from getting things that you need?: No   Physical Activity: Not on file   Stress: Not on file   Social Connections: Not on file   Interpersonal Safety: Not on file   Housing Stability: Low Risk  (5/14/2024)    Housing Stability      Do you have housing? : Yes      Are you worried about losing your housing?: No     FAMILY MEDICAL HISTORY:   No family history on file.  PHYSICAL EXAM:   There were no vitals taken for this visit.  GENERAL APPEARANCE: alert and no distress  EYES: nonicteric  HENT: mouth without ulcers or lesions  NECK: supple, no adenopathy  RESP: lungs clear to auscultation   CV: regular rhythm, normal rate, no rub  ABDOMEN: soft, nontender, normal bowel sounds, no HSM   Extremities: no clubbing, cyanosis, or edema  MS: no evidence of inflammation in joints, no muscle tenderness  SKIN: no  rash  NEURO: mentation intact and speech normal  PSYCH: affect normal/bright   LABS:   No results found for this or any previous visit (from the past 672 hour(s)).  CMP  Recent Labs   Lab Test 11/21/23  1605 04/28/23  1550 03/09/22  0811 01/27/22  1436    137 138 140   POTASSIUM 4.0 4.1 4.5 4.5   CHLORIDE 102 99 105 105   CO2 24 22 27 27   ANIONGAP 11 16* 6 8   * 138* 153* 169*   BUN 27.9* 26.6* 24 35*   CR 1.72* 1.57* 1.55* 1.89*   GFRESTIMATED 29* 33* 33* 26*   YOGI 9.4 10.0 9.2 9.5     CBCNo lab results found.  INRNo lab results found.  ABGNo lab results found.   URINE STUDIES  Recent Labs   Lab Test 07/28/22  1552   COLOR Yellow   APPEARANCE Clear   URINEGLC Negative   URINEBILI Negative   URINEKETONE Negative   SG 1.010   UBLD Negative   URINEPH 7.0   PROTEIN Negative   UROBILINOGEN 0.2   NITRITE Negative   LEUKEST Negative     No lab results found.    ASSESSMENT AND PLAN:   #CKD stage 3 with potentially contributing factors type 2 diabetes, hypertension, vascular disease, cardiorenal syndrome and decline related to age. I will obtain repeat comprehensive labs and urine studies in addition to a renal ultrasound with doppler studies. The patient was also instructed to keep a BP diary at home and to communicate the results.  In addition, the patient was instructed to keep the sodium intake around 2300 mg /day, follow a plant-based diet and to avoid NSAIDs     #HTN  Primary and secondary to CKD. Management as per above. The patient was instructed to keep a BP diary and to communicate the results    #Type 2 DM   Hba1c in November 2023 off medications is 7.5. Will recheck    #CVD/dyslipidemia/PVD  On  rosuvastatin as indicated for any patient with CKD above the age of 50     #Blood count  Not available. Will check.    #Acid-base status  CO2 level 24  No need for sodium bicarbonate supplementation    #Electrolytes  Na 137  K 4  No acute issues    #CKD journey/transplant not a candidate at this  point    The total time of this encounter amounted to 60 minutes on the day of the encounter. This time included time spent with the patient, reviewing records, ordering tests, and performing post visit documentation.     The patient will return to follow up in 6 weeks    Nafisa Aparicio MD  Division of Renal Disease and Hypertension  August 19, 2024  1:52 PM       Again, thank you for allowing me to participate in the care of your patient.      Sincerely,    Nafisa Aparicio MD

## 2024-08-19 NOTE — NURSING NOTE
Chief Complaint   Patient presents with    Consult     NEW NEPHROLOGY - Complete RM. Per Pt, Chronic kidney disease [N18.9], referred by Nicholas Velazquez MD,r ecords in [facility],location verified         Vitals:    08/19/24 1403 08/19/24 1408   BP: (!) 197/79 (!) 188/68   BP Location: Right arm Right arm   Patient Position: Sitting Sitting   Cuff Size: Adult Regular Adult Regular   Pulse: 88    Temp: 98.8  F (37.1  C)    TempSrc: Oral    SpO2: 100%    Weight: 53.4 kg (117 lb 12.8 oz)        BP Readings from Last 3 Encounters:   08/19/24 (!) 188/68   05/14/24 (!) 174/74   02/21/24 (!) 160/75       BP (!) 188/68 (BP Location: Right arm, Patient Position: Sitting, Cuff Size: Adult Regular)   Pulse 88   Temp 98.8  F (37.1  C) (Oral)   Wt 53.4 kg (117 lb 12.8 oz)   SpO2 100%   BMI 22.26 kg/m       [place], [facility]

## 2024-08-19 NOTE — PROGRESS NOTES
Nephrology Clinic    Ashley Lynn MRN:[id number] YOB: 1940  Date of Service: 08/19/2024  Primary care provider: Nicholas Velazquez  Requesting physician: Nicholas Velazquez      REASON FOR CONSULT: CKD    HISTORY OF PRESENT ILLNESS:   Ashley Lynn is a 83 year old female who presents for evaluation of CKD.  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  From a renal standpoint, it is unclear when CKD started as her kidney function has been infrequently checked. Her creatinine level was  1.1 mg/dL in 2020, not measured in 2021 two measures are available in 2022 at 1.89 and 1.55 mg/dL. It was measured twice in 2023 and was 1.57 mg/dL in April 2023 and 1.72 mg/dL in November 2023 and hasn't been measured since then. A urinalysis done in July 2022 shows no evidence of albuminuria, hematuria or leucocyturia. There is no renal imaging available. For hypertension, the patient is lisinopril-hydrochlorothiazide 10-12.5 mg daily. The blood pressure has been uncontrolled in clinic. There are no available measures at home.  The diabetes has been in general well controlled with diet only with the last Hba1c at 7.5 in November 2023.   The following portions of the patient's history were reviewed and updated as appropriate: allergies, current medications, past family history, past medical history, past social history, past surgical history and problem list.    PAST MEDICAL HISTORY:  Past Medical History:   Diagnosis Date    Cardiac pacemaker in situ 10/07/2020    Type 2 diabetes mellitus without complication, without long-term current use of insulin (H) 10/07/2020     PAST SURGICAL HISTORY:  Past Surgical History:   Procedure Laterality Date    IMPLANT PACEMAKER       MEDICATIONS:  Prescription Medications  as of 8/19/2024         Rx Number Disp Refills Start End Last Dispensed Date Next Fill Date Owning Pharmacy    Continuous Blood Gluc  (FREESTYLE ANURAG 14 DAY READER) STEVIE  1 each 0 10/13/2020 --   [facility]/pharmacy #[address]    Sig: Use to read blood sugars as per 's instructions.    Class: E-Prescribe    Continuous Glucose Sensor (FREESTYLE ANURAG 14 DAY SENSOR) MISC  2 each 0 5/21/2024 --   [facility][id number] - [place] [address]    Sig: Change every 14 days.    Class: E-Prescribe    lisinopril-hydrochlorothiazide (ZESTORETIC) 10-12.5 MG tablet  90 tablet 0 8/16/2024 --   [facility] [address] [place] [address]    Sig: Take 1 tablet by mouth daily    Class: E-Prescribe    Route: Oral    Multiple Vitamins-Minerals (MULTIVITAMIN ADULT PO)  -- --  --   [facility]/pharmacy #[id number]    Class: Historical    Route: Oral    rosuvastatin (CRESTOR) 10 MG tablet  90 tablet 3 5/14/2024 --   [facility] [address] [place] [address]    Sig: Take 1 tablet (10 mg) by mouth daily    Class: E-Prescribe    Route: Oral           ALLERGIES:    No Known Allergies  REVIEW OF SYSTEMS:    A comprehensive review of systems was performed and found to be negative except as described here or above.  SOCIAL HISTORY:   Social History     Socioeconomic History    Marital status:      Spouse name: Not on file    Number of children: Not on file    Years of education: Not on file    Highest education level: Not on file   Occupational History    Not on file   Tobacco Use    Smoking status: Never     Passive exposure: Never (per pt)    Smokeless tobacco: Never   Substance and Sexual Activity    Alcohol use: Never    Drug use: Not on file    Sexual activity: Not on file   Other Topics Concern    Not on file   Social History Narrative    Not on file      Social Determinants of Health     Financial Resource Strain: Low Risk  (5/14/2024)    Financial Resource Strain     Within the past 12 months, have you or your family members you live with been unable to get utilities (heat, electricity) when it was really needed?: No   Food Insecurity: Low Risk  (5/14/2024)    Food Insecurity     Within the past 12 months, did you worry that your food would run out before you got money to buy more?: No     Within the past 12 months, did the food you bought just not last and you didn t have money to get more?: No   Transportation Needs: Low Risk  (5/14/2024)    Transportation Needs     Within the past 12 months, has lack of transportation kept you from medical appointments, getting your medicines, non-medical meetings or appointments, work, or from getting things that you need?: No   Physical Activity: Not on file   Stress: Not on file   Social Connections: Not on file   Interpersonal Safety: Not on file   Housing Stability: Low Risk  (5/14/2024)    Housing Stability     Do you have housing? : Yes     Are you worried about losing your housing?: No     FAMILY MEDICAL HISTORY:   No family history on file.  PHYSICAL EXAM:   There were no vitals taken for this visit.  GENERAL APPEARANCE: alert and no distress  EYES: nonicteric  HENT: mouth without ulcers or lesions  NECK: supple, no adenopathy  RESP: lungs clear to auscultation   CV: regular rhythm, normal rate, no rub  ABDOMEN: soft, nontender, normal bowel sounds, no HSM   Extremities: no clubbing, cyanosis, or edema  MS: no evidence of inflammation in joints, no muscle tenderness  SKIN: no rash  NEURO: mentation intact and speech normal  PSYCH: affect normal/bright   LABS:   No results found for this or any previous visit (from the past 672 hour(s)).  CMP  Recent Labs   Lab Test 11/21/23  1605 04/28/23  1550 03/09/22  0811 01/27/22  1436    137 138 140   POTASSIUM 4.0 4.1 4.5 4.5   CHLORIDE 102 99 105 105   CO2 24 22 27 27    ANIONGAP 11 16* 6 8   * 138* 153* 169*   BUN 27.9* 26.6* 24 35*   CR 1.72* 1.57* 1.55* 1.89*   GFRESTIMATED 29* 33* 33* 26*   YOGI 9.4 10.0 9.2 9.5     CBCNo lab results found.  INRNo lab results found.  ABGNo lab results found.   URINE STUDIES  Recent Labs   Lab Test 07/28/22  1552   COLOR Yellow   APPEARANCE Clear   URINEGLC Negative   URINEBILI Negative   URINEKETONE Negative   SG 1.010   UBLD Negative   URINEPH 7.0   PROTEIN Negative   UROBILINOGEN 0.2   NITRITE Negative   LEUKEST Negative     No lab results found.    ASSESSMENT AND PLAN:   #CKD stage 3 with potentially contributing factors type 2 diabetes, hypertension, vascular disease, cardiorenal syndrome and decline related to age. I will obtain repeat comprehensive labs and urine studies in addition to a renal ultrasound with doppler studies. The patient was also instructed to keep a BP diary at home and to communicate the results.  In addition, the patient was instructed to keep the sodium intake around 2300 mg /day, follow a plant-based diet and to avoid NSAIDs     #HTN  Primary and secondary to CKD. Management as per above. The patient was instructed to keep a BP diary and to communicate the results    #Type 2 DM   Hba1c in November 2023 off medications is 7.5. Will recheck    #CVD/dyslipidemia/PVD  On  rosuvastatin as indicated for any patient with CKD above the age of 50     #Blood count  Not available. Will check.    #Acid-base status  CO2 level 24  No need for sodium bicarbonate supplementation    #Electrolytes  Na 137  K 4  No acute issues    #CKD journey/transplant not a candidate at this point    The total time of this encounter amounted to 60 minutes on the day of the encounter. This time included time spent with the patient, reviewing records, ordering tests, and performing post visit documentation.     The patient will return to follow up in 6 weeks    Nafisa Aparicio MD  Division of Renal Disease and Hypertension  August 19, 2024  1:52  PM

## 2024-08-20 ENCOUNTER — TELEPHONE (OUTPATIENT)
Dept: NEPHROLOGY | Facility: CLINIC | Age: 84
End: 2024-08-20
Payer: MEDICARE

## 2024-08-20 NOTE — TELEPHONE ENCOUNTER
Patient Contacted for the patient to call back and schedule the following:    Appointment type: Return Nephrology   Provider: Dr. Aparicio   Return date: 09/30/24  Specialty phone number: [phone]  Additional appointment(s) needed: n/a   Additonal Notes: n/a

## 2024-08-27 ENCOUNTER — ANCILLARY PROCEDURE (OUTPATIENT)
Dept: ULTRASOUND IMAGING | Facility: CLINIC | Age: 84
End: 2024-08-27
Attending: INTERNAL MEDICINE
Payer: MEDICARE

## 2024-08-27 ENCOUNTER — OFFICE VISIT (OUTPATIENT)
Dept: FAMILY MEDICINE | Facility: CLINIC | Age: 84
End: 2024-08-27
Payer: MEDICARE

## 2024-08-27 VITALS
DIASTOLIC BLOOD PRESSURE: 75 MMHG | TEMPERATURE: 98 F | RESPIRATION RATE: 12 BRPM | OXYGEN SATURATION: 100 % | WEIGHT: 115 LBS | BODY MASS INDEX: 21.73 KG/M2 | HEART RATE: 66 BPM | SYSTOLIC BLOOD PRESSURE: 170 MMHG

## 2024-08-27 DIAGNOSIS — E11.59 TYPE 2 DIABETES MELLITUS WITH OTHER CIRCULATORY COMPLICATION, UNSPECIFIED WHETHER LONG TERM INSULIN USE (H): ICD-10-CM

## 2024-08-27 DIAGNOSIS — N18.30 CHRONIC KIDNEY DISEASE (CKD), ACTIVE MEDICAL MANAGEMENT WITHOUT DIALYSIS, STAGE 3 (MODERATE) (H): ICD-10-CM

## 2024-08-27 DIAGNOSIS — I10 UNCONTROLLED HYPERTENSION: ICD-10-CM

## 2024-08-27 DIAGNOSIS — I10 ESSENTIAL HYPERTENSION: ICD-10-CM

## 2024-08-27 PROCEDURE — 76770 US EXAM ABDO BACK WALL COMP: CPT | Mod: XU | Performed by: RADIOLOGY

## 2024-08-27 PROCEDURE — 93975 VASCULAR STUDY: CPT | Performed by: RADIOLOGY

## 2024-08-27 RX ORDER — FLASH GLUCOSE SCANNING READER
EACH MISCELLANEOUS
Qty: 1 EACH | Refills: 0 | Status: CANCELLED | OUTPATIENT
Start: 2024-08-27

## 2024-08-27 RX ORDER — FLASH GLUCOSE SENSOR
KIT MISCELLANEOUS
Qty: 6 EACH | Refills: 3 | Status: SHIPPED | OUTPATIENT
Start: 2024-08-27

## 2024-08-27 RX ORDER — FLASH GLUCOSE SENSOR
KIT MISCELLANEOUS
Qty: 2 EACH | Refills: 0 | Status: CANCELLED | OUTPATIENT
Start: 2024-08-27

## 2024-08-27 RX ORDER — ROSUVASTATIN CALCIUM 10 MG/1
10 TABLET, COATED ORAL DAILY
Qty: 90 TABLET | Refills: 3 | Status: SHIPPED | OUTPATIENT
Start: 2024-08-27

## 2024-08-27 RX ORDER — FLASH GLUCOSE SCANNING READER
EACH MISCELLANEOUS
Qty: 1 EACH | Refills: 0 | Status: SHIPPED | OUTPATIENT
Start: 2024-08-27

## 2024-08-27 RX ORDER — LISINOPRIL/HYDROCHLOROTHIAZIDE 10-12.5 MG
1 TABLET ORAL DAILY
Qty: 90 TABLET | Refills: 3 | Status: SHIPPED | OUTPATIENT
Start: 2024-08-27

## 2024-08-27 NOTE — PROGRESS NOTES
"  Assessment & Plan   Problem List Items Addressed This Visit          Endocrine    Type 2 diabetes mellitus with other circulatory complication, unspecified whether long term insulin use (H)    Relevant Medications    rosuvastatin (CRESTOR) 10 MG tablet    Continuous Glucose  (FREESTYLE ANURAG 14 DAY READER) STEVIE    Continuous Glucose Sensor (FREESTYLE ANURAG 14 DAY SENSOR) MISC       Circulatory    Essential hypertension    Relevant Medications    lisinopril-hydrochlorothiazide (ZESTORETIC) 10-12.5 MG tablet      Reviewed recent labs and U/S report. Medications and supplies refilled as requested.     I continue to have some concern that Ashley's home pressure cuff may not be recording accurate pressures. I requested Ashley return to this clinic with her home BP cuff so we both cuffs at the same time.     Some concern for continued use of hydrochlorothiazide given Ashley's kidney function. Most recent BMP results appear to be reassuring for continued efficacy and safety of the diuretic but I will continue to monitor.     The longitudinal plan of care for the diagnosis(es)/condition(s) as documented were addressed during this visit. Due to the added complexity in care, I will continue to support Ashley in the subsequent management and with ongoing continuity of care.    32 minutes spent on the date of the encounter doing chart review, history and exam, documentation and further activities as noted.    Nicholas Velazquez MD  4:56 PM, August 27, 2024        Subjective   Ashley is a 83 year old, presenting for the following health issues:  Follow Up (Follow up from nephrology - discuss ultrasound results, refill BP med)    HPI   \"Follow Up\"  - visit with nephrology on 8/19 to address CKD (G3b based on most recent eGFR)  - labs and recent kidney ultrasounds reviewed  - BP elevated in clinic today, [facility] reports home pressures continue to be wnls  - review of Dr. Aparicio's note, she has requested Khang start keeping a record of " home pressures but [facility] does not have this available for me to review today  - [facility] requesting diabetes supplies refill today as well as refills of Ashley's BP med and statin        Review of Systems  Constitutional, HEENT, cardiovascular, pulmonary, gi and gu systems are negative, except as otherwise noted.      Objective    BP (!) 170/75 (BP Location: Left arm, Patient Position: Sitting, Cuff Size: Adult Small)   Pulse 66   Temp 98  F (36.7  C) (Skin)   Resp 12   Wt 52.2 kg (115 lb)   SpO2 100%   BMI 21.73 kg/m    Body mass index is 21.73 kg/m .    Physical Exam   GENERAL: alert and no distress  NECK: no adenopathy, no asymmetry, masses, or scars  RESP: lungs clear to auscultation - no rales, rhonchi or wheezes  CV: regular rate and rhythm, normal S1 S2, no S3 or S4, no murmur, click or rub, no peripheral edema  ABDOMEN: soft, nontender, no hepatosplenomegaly, no masses and bowel sounds normal  MS: no gross musculoskeletal defects noted, no edema        Signed Electronically by: Nicholas Velazquez MD

## 2024-08-27 NOTE — NURSING NOTE
Ashley  83 year old    Chief Complaint   Patient presents with    Follow Up     Follow up from nephrology - discuss ultrasound results, refill BP med            Blood pressure (!) 170/75, pulse 66, temperature 98  F (36.7  C), temperature source Skin, resp. rate 12, weight 52.2 kg (115 lb), SpO2 100%. Body mass index is 21.73 kg/m .    Patient Active Problem List   Diagnosis    Type 2 diabetes mellitus with other circulatory complication, unspecified whether long term insulin use (H)    Bradycardia    Cardiac pacemaker in situ    Essential hypertension              Wt Readings from Last 2 Encounters:   08/27/24 52.2 kg (115 lb)   08/19/24 53.4 kg (117 lb 12.8 oz)       BP Readings from Last 3 Encounters:   08/27/24 (!) 170/75   08/19/24 (!) 188/68   05/14/24 (!) 174/74                Current Outpatient Medications   Medication Sig Dispense Refill    Continuous Blood Gluc  (FREESTYLE ANURAG 14 DAY READER) STEVIE Use to read blood sugars as per 's instructions. 1 each 0    Continuous Glucose Sensor (FREESTYLE ANURAG 14 DAY SENSOR) MISC Change every 14 days. 2 each 0    lisinopril-hydrochlorothiazide (ZESTORETIC) 10-12.5 MG tablet Take 1 tablet by mouth daily 90 tablet 0    Multiple Vitamins-Minerals (MULTIVITAMIN ADULT PO)       rosuvastatin (CRESTOR) 10 MG tablet Take 1 tablet (10 mg) by mouth daily 90 tablet 3     No current facility-administered medications for this visit.              Social History     Tobacco Use    Smoking status: Never     Passive exposure: Never (per pt)    Smokeless tobacco: Never   Substance Use Topics    Alcohol use: Never    Drug use: Never              Health Maintenance Due   Topic Date Due    ADVANCE CARE PLANNING  Never done    DTAP/TDAP/TD IMMUNIZATION (1 - Tdap) Never done    ZOSTER IMMUNIZATION (1 of 2) Never done    RSV VACCINE (Pregnancy & 60+) (1 - 1-dose 60+ series) Never done    MEDICARE ANNUAL WELLNESS VISIT  Never done    Pneumococcal Vaccine: 65+ Years (2 of 2  "- PCV) 10/07/2021    LIPID  01/27/2023    FALL RISK ASSESSMENT  01/27/2023    DIABETIC FOOT EXAM  07/28/2023    COVID-19 Vaccine (6 - 2023-24 season) 03/09/2024    MICROALBUMIN  04/28/2024    EYE EXAM  07/20/2024            No results found for: \"PAP\"           August 27, 2024 3:26 PM    "

## 2024-08-28 ENCOUNTER — ANCILLARY PROCEDURE (OUTPATIENT)
Dept: CARDIOLOGY | Facility: CLINIC | Age: 84
End: 2024-08-28
Attending: INTERNAL MEDICINE
Payer: MEDICARE

## 2024-08-28 DIAGNOSIS — I44.30 ATRIOVENTRICULAR BLOCK: ICD-10-CM

## 2024-08-28 PROCEDURE — 93296 REM INTERROG EVL PM/IDS: CPT

## 2024-08-28 PROCEDURE — 93294 REM INTERROG EVL PM/LDLS PM: CPT | Performed by: INTERNAL MEDICINE

## 2024-09-15 LAB
MDC_IDC_LEAD_CONNECTION_STATUS: NORMAL
MDC_IDC_LEAD_CONNECTION_STATUS: NORMAL
MDC_IDC_LEAD_IMPLANT_DT: NORMAL
MDC_IDC_LEAD_IMPLANT_DT: NORMAL
MDC_IDC_LEAD_LOCATION: NORMAL
MDC_IDC_LEAD_LOCATION: NORMAL
MDC_IDC_LEAD_LOCATION_DETAIL_1: NORMAL
MDC_IDC_LEAD_LOCATION_DETAIL_1: NORMAL
MDC_IDC_LEAD_MFG: NORMAL
MDC_IDC_LEAD_MFG: NORMAL
MDC_IDC_LEAD_MODEL: NORMAL
MDC_IDC_LEAD_MODEL: NORMAL
MDC_IDC_LEAD_POLARITY_TYPE: NORMAL
MDC_IDC_LEAD_POLARITY_TYPE: NORMAL
MDC_IDC_LEAD_SERIAL: NORMAL
MDC_IDC_LEAD_SERIAL: NORMAL
MDC_IDC_LEAD_SPECIAL_FUNCTION: NORMAL
MDC_IDC_LEAD_SPECIAL_FUNCTION: NORMAL
MDC_IDC_MSMT_BATTERY_DTM: NORMAL
MDC_IDC_MSMT_BATTERY_REMAINING_LONGEVITY: 109 MO
MDC_IDC_MSMT_BATTERY_RRT_TRIGGER: 2.62
MDC_IDC_MSMT_BATTERY_STATUS: NORMAL
MDC_IDC_MSMT_BATTERY_VOLTAGE: 3 V
MDC_IDC_MSMT_LEADCHNL_RA_IMPEDANCE_VALUE: 456 OHM
MDC_IDC_MSMT_LEADCHNL_RA_IMPEDANCE_VALUE: 494 OHM
MDC_IDC_MSMT_LEADCHNL_RA_PACING_THRESHOLD_AMPLITUDE: 0.38 V
MDC_IDC_MSMT_LEADCHNL_RA_PACING_THRESHOLD_PULSEWIDTH: 0.4 MS
MDC_IDC_MSMT_LEADCHNL_RA_SENSING_INTR_AMPL: 2.88 MV
MDC_IDC_MSMT_LEADCHNL_RA_SENSING_INTR_AMPL: 2.88 MV
MDC_IDC_MSMT_LEADCHNL_RV_IMPEDANCE_VALUE: 323 OHM
MDC_IDC_MSMT_LEADCHNL_RV_IMPEDANCE_VALUE: 418 OHM
MDC_IDC_MSMT_LEADCHNL_RV_PACING_THRESHOLD_AMPLITUDE: 0.88 V
MDC_IDC_MSMT_LEADCHNL_RV_PACING_THRESHOLD_PULSEWIDTH: 0.4 MS
MDC_IDC_MSMT_LEADCHNL_RV_SENSING_INTR_AMPL: 2.38 MV
MDC_IDC_MSMT_LEADCHNL_RV_SENSING_INTR_AMPL: 2.38 MV
MDC_IDC_PG_IMPLANT_DTM: NORMAL
MDC_IDC_PG_MFG: NORMAL
MDC_IDC_PG_MODEL: NORMAL
MDC_IDC_PG_SERIAL: NORMAL
MDC_IDC_PG_TYPE: NORMAL
MDC_IDC_SESS_CLINIC_NAME: NORMAL
MDC_IDC_SESS_DTM: NORMAL
MDC_IDC_SESS_TYPE: NORMAL
MDC_IDC_SET_BRADY_AT_MODE_SWITCH_RATE: 171 {BEATS}/MIN
MDC_IDC_SET_BRADY_HYSTRATE: NORMAL
MDC_IDC_SET_BRADY_LOWRATE: 60 {BEATS}/MIN
MDC_IDC_SET_BRADY_MAX_SENSOR_RATE: 120 {BEATS}/MIN
MDC_IDC_SET_BRADY_MAX_TRACKING_RATE: 120 {BEATS}/MIN
MDC_IDC_SET_BRADY_MODE: NORMAL
MDC_IDC_SET_BRADY_PAV_DELAY_LOW: 220 MS
MDC_IDC_SET_BRADY_SAV_DELAY_LOW: 200 MS
MDC_IDC_SET_LEADCHNL_RA_PACING_AMPLITUDE: 1.5 V
MDC_IDC_SET_LEADCHNL_RA_PACING_ANODE_ELECTRODE_1: NORMAL
MDC_IDC_SET_LEADCHNL_RA_PACING_ANODE_LOCATION_1: NORMAL
MDC_IDC_SET_LEADCHNL_RA_PACING_CAPTURE_MODE: NORMAL
MDC_IDC_SET_LEADCHNL_RA_PACING_CATHODE_ELECTRODE_1: NORMAL
MDC_IDC_SET_LEADCHNL_RA_PACING_CATHODE_LOCATION_1: NORMAL
MDC_IDC_SET_LEADCHNL_RA_PACING_POLARITY: NORMAL
MDC_IDC_SET_LEADCHNL_RA_PACING_PULSEWIDTH: 0.4 MS
MDC_IDC_SET_LEADCHNL_RA_SENSING_ANODE_ELECTRODE_1: NORMAL
MDC_IDC_SET_LEADCHNL_RA_SENSING_ANODE_LOCATION_1: NORMAL
MDC_IDC_SET_LEADCHNL_RA_SENSING_CATHODE_ELECTRODE_1: NORMAL
MDC_IDC_SET_LEADCHNL_RA_SENSING_CATHODE_LOCATION_1: NORMAL
MDC_IDC_SET_LEADCHNL_RA_SENSING_POLARITY: NORMAL
MDC_IDC_SET_LEADCHNL_RA_SENSING_SENSITIVITY: 0.3 MV
MDC_IDC_SET_LEADCHNL_RV_PACING_AMPLITUDE: 1.75 V
MDC_IDC_SET_LEADCHNL_RV_PACING_ANODE_ELECTRODE_1: NORMAL
MDC_IDC_SET_LEADCHNL_RV_PACING_ANODE_LOCATION_1: NORMAL
MDC_IDC_SET_LEADCHNL_RV_PACING_CAPTURE_MODE: NORMAL
MDC_IDC_SET_LEADCHNL_RV_PACING_CATHODE_ELECTRODE_1: NORMAL
MDC_IDC_SET_LEADCHNL_RV_PACING_CATHODE_LOCATION_1: NORMAL
MDC_IDC_SET_LEADCHNL_RV_PACING_POLARITY: NORMAL
MDC_IDC_SET_LEADCHNL_RV_PACING_PULSEWIDTH: 0.4 MS
MDC_IDC_SET_LEADCHNL_RV_SENSING_ANODE_ELECTRODE_1: NORMAL
MDC_IDC_SET_LEADCHNL_RV_SENSING_ANODE_LOCATION_1: NORMAL
MDC_IDC_SET_LEADCHNL_RV_SENSING_CATHODE_ELECTRODE_1: NORMAL
MDC_IDC_SET_LEADCHNL_RV_SENSING_CATHODE_LOCATION_1: NORMAL
MDC_IDC_SET_LEADCHNL_RV_SENSING_POLARITY: NORMAL
MDC_IDC_SET_LEADCHNL_RV_SENSING_SENSITIVITY: 0.9 MV
MDC_IDC_SET_ZONE_DETECTION_INTERVAL: 350 MS
MDC_IDC_SET_ZONE_DETECTION_INTERVAL: 400 MS
MDC_IDC_SET_ZONE_STATUS: NORMAL
MDC_IDC_SET_ZONE_STATUS: NORMAL
MDC_IDC_SET_ZONE_TYPE: NORMAL
MDC_IDC_SET_ZONE_VENDOR_TYPE: NORMAL
MDC_IDC_STAT_AT_BURDEN_PERCENT: 0 %
MDC_IDC_STAT_AT_DTM_END: NORMAL
MDC_IDC_STAT_AT_DTM_START: NORMAL
MDC_IDC_STAT_BRADY_AP_VP_PERCENT: 35.85 %
MDC_IDC_STAT_BRADY_AP_VS_PERCENT: 0 %
MDC_IDC_STAT_BRADY_AS_VP_PERCENT: 64.11 %
MDC_IDC_STAT_BRADY_AS_VS_PERCENT: 0.05 %
MDC_IDC_STAT_BRADY_DTM_END: NORMAL
MDC_IDC_STAT_BRADY_DTM_START: NORMAL
MDC_IDC_STAT_BRADY_RA_PERCENT_PACED: 35.81 %
MDC_IDC_STAT_BRADY_RV_PERCENT_PACED: 99.95 %
MDC_IDC_STAT_EPISODE_RECENT_COUNT: 0
MDC_IDC_STAT_EPISODE_RECENT_COUNT_DTM_END: NORMAL
MDC_IDC_STAT_EPISODE_RECENT_COUNT_DTM_START: NORMAL
MDC_IDC_STAT_EPISODE_TOTAL_COUNT: 0
MDC_IDC_STAT_EPISODE_TOTAL_COUNT: 265
MDC_IDC_STAT_EPISODE_TOTAL_COUNT: 3
MDC_IDC_STAT_EPISODE_TOTAL_COUNT_DTM_END: NORMAL
MDC_IDC_STAT_EPISODE_TOTAL_COUNT_DTM_START: NORMAL
MDC_IDC_STAT_EPISODE_TYPE: NORMAL
MDC_IDC_STAT_TACHYTHERAPY_RECENT_DTM_END: NORMAL
MDC_IDC_STAT_TACHYTHERAPY_RECENT_DTM_START: NORMAL
MDC_IDC_STAT_TACHYTHERAPY_TOTAL_DTM_END: NORMAL
MDC_IDC_STAT_TACHYTHERAPY_TOTAL_DTM_START: NORMAL

## 2024-09-17 DIAGNOSIS — N18.30 CHRONIC KIDNEY DISEASE (CKD), ACTIVE MEDICAL MANAGEMENT WITHOUT DIALYSIS, STAGE 3 (MODERATE) (H): Primary | ICD-10-CM

## 2024-09-23 DIAGNOSIS — E11.59 TYPE 2 DIABETES MELLITUS WITH OTHER CIRCULATORY COMPLICATION, UNSPECIFIED WHETHER LONG TERM INSULIN USE (H): ICD-10-CM

## 2024-09-23 RX ORDER — FLASH GLUCOSE SENSOR
KIT MISCELLANEOUS
Qty: 6 EACH | Refills: 3 | Status: CANCELLED | OUTPATIENT
Start: 2024-09-23

## 2024-09-30 ENCOUNTER — OFFICE VISIT (OUTPATIENT)
Dept: NEPHROLOGY | Facility: CLINIC | Age: 84
End: 2024-09-30
Attending: PODIATRIST
Payer: MEDICARE

## 2024-09-30 ENCOUNTER — LAB (OUTPATIENT)
Dept: LAB | Facility: CLINIC | Age: 84
End: 2024-09-30
Attending: PODIATRIST
Payer: MEDICARE

## 2024-09-30 VITALS
OXYGEN SATURATION: 98 % | SYSTOLIC BLOOD PRESSURE: 170 MMHG | HEART RATE: 70 BPM | WEIGHT: 117.5 LBS | DIASTOLIC BLOOD PRESSURE: 75 MMHG | TEMPERATURE: 98.2 F | BODY MASS INDEX: 22.2 KG/M2

## 2024-09-30 DIAGNOSIS — N18.30 CHRONIC KIDNEY DISEASE (CKD), ACTIVE MEDICAL MANAGEMENT WITHOUT DIALYSIS, STAGE 3 (MODERATE) (H): ICD-10-CM

## 2024-09-30 DIAGNOSIS — E11.59 TYPE 2 DIABETES MELLITUS WITH OTHER CIRCULATORY COMPLICATION, UNSPECIFIED WHETHER LONG TERM INSULIN USE (H): ICD-10-CM

## 2024-09-30 DIAGNOSIS — N18.30 CHRONIC KIDNEY DISEASE (CKD), ACTIVE MEDICAL MANAGEMENT WITHOUT DIALYSIS, STAGE 3 (MODERATE) (H): Primary | ICD-10-CM

## 2024-09-30 LAB
ALBUMIN MFR UR ELPH: 8.2 MG/DL
ALBUMIN SERPL BCG-MCNC: 4.3 G/DL (ref 3.5–5.2)
ANION GAP SERPL CALCULATED.3IONS-SCNC: 11 MMOL/L (ref 7–15)
BUN SERPL-MCNC: 32.1 MG/DL (ref 8–23)
CALCIUM SERPL-MCNC: 9.7 MG/DL (ref 8.8–10.4)
CHLORIDE SERPL-SCNC: 103 MMOL/L (ref 98–107)
CREAT SERPL-MCNC: 1.39 MG/DL (ref 0.51–0.95)
CREAT UR-MCNC: 81.2 MG/DL
EGFRCR SERPLBLD CKD-EPI 2021: 38 ML/MIN/1.73M2
GLUCOSE SERPL-MCNC: 109 MG/DL (ref 70–99)
HCO3 SERPL-SCNC: 27 MMOL/L (ref 22–29)
HGB BLD-MCNC: 11.5 G/DL (ref 11.7–15.7)
PHOSPHATE SERPL-MCNC: 3.7 MG/DL (ref 2.5–4.5)
POTASSIUM SERPL-SCNC: 4.5 MMOL/L (ref 3.4–5.3)
PROT/CREAT 24H UR: 0.1 MG/MG CR (ref 0–0.2)
SODIUM SERPL-SCNC: 141 MMOL/L (ref 135–145)

## 2024-09-30 PROCEDURE — 80069 RENAL FUNCTION PANEL: CPT | Performed by: PATHOLOGY

## 2024-09-30 PROCEDURE — 85018 HEMOGLOBIN: CPT | Performed by: PATHOLOGY

## 2024-09-30 PROCEDURE — 83550 IRON BINDING TEST: CPT | Performed by: PATHOLOGY

## 2024-09-30 PROCEDURE — 83540 ASSAY OF IRON: CPT | Performed by: PATHOLOGY

## 2024-09-30 PROCEDURE — 84156 ASSAY OF PROTEIN URINE: CPT | Performed by: PATHOLOGY

## 2024-09-30 PROCEDURE — 36415 COLL VENOUS BLD VENIPUNCTURE: CPT | Performed by: PATHOLOGY

## 2024-09-30 PROCEDURE — 99214 OFFICE O/P EST MOD 30 MIN: CPT | Performed by: INTERNAL MEDICINE

## 2024-09-30 PROCEDURE — 82728 ASSAY OF FERRITIN: CPT | Performed by: PATHOLOGY

## 2024-09-30 PROCEDURE — G0463 HOSPITAL OUTPT CLINIC VISIT: HCPCS | Performed by: INTERNAL MEDICINE

## 2024-09-30 ASSESSMENT — PAIN SCALES - GENERAL: PAINLEVEL: NO PAIN (0)

## 2024-09-30 NOTE — NURSING NOTE
Chief Complaint   Patient presents with    RECHECK     RETURN NEPHROLOGY - 6 wk f/u       Vitals:    09/30/24 1333 09/30/24 1335   BP: (!) 180/92 (!) 170/75   BP Location: Left arm Left arm   Patient Position: Sitting Sitting   Cuff Size: Adult Regular Adult Regular   Pulse: 70    Temp: 98.2  F (36.8  C)    TempSrc: Oral    SpO2: 98%    Weight: 53.3 kg (117 lb 8 oz)        BP Readings from Last 3 Encounters:   09/30/24 (!) 170/75   08/27/24 (!) 170/75   08/19/24 (!) 188/68       BP (!) 170/75 (BP Location: Left arm, Patient Position: Sitting, Cuff Size: Adult Regular)   Pulse 70   Temp 98.2  F (36.8  C) (Oral)   Wt 53.3 kg (117 lb 8 oz)   SpO2 98%   BMI 22.20 kg/m       [place], [facility]

## 2024-09-30 NOTE — PROGRESS NOTES
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  From a renal standpoint, it is unclear when CKD started as her kidney function has been infrequently checked. Her creatinine level was  1.1 mg/dL in 2020, not measured in 2021 two measures are available in 2022 at 1.89 and 1.55 mg/dL. It was measured twice in 2023 and was 1.57 mg/dL in April 2023 and 1.72 mg/dL in November 2023,  1.26 mg/dL on 8/19/2024 and 1.39 mg/dL on 9/30/2024. A urinalysis done in July 2022 shows no evidence of albuminuria, hematuria or leucocyturia. The uPCR is 0.15 mg/mg Cr in August 2024 and 0.1 mg/mg Cr in September 2024. A renal ultrasound done in August 2024 shows bilateral atrophic kidneys. For hypertension, the patient is lisinopril-hydrochlorothiazide 10-12.5 mg daily. The blood pressure has been uncontrolled in clinic however she brings at the time of the visit a BP diary which shows good control at home.  The diabetes has been in general well controlled with diet only with an Hba1c at 7.5 in November 2023 and at 6.9 in August 2024.  The following portions of the patient's history were reviewed and updated as appropriate: allergies, current medications, past family history, past medical history, past social history, past surgical history and problem list.    ULTRASOUND RENAL COMPLETE WITH DOPPLER 8/27/2024 8:33 AM     CLINICAL HISTORY: Chronic renal disease.  Hypertension.     COMPARISONS:  None available.     ORDERING PROVIDER: ASIM BARRAZA     TECHNIQUE: B-mode (grayscale) and duplex Doppler evaluation of the  abdominal aorta and renal arteries performed. Velocity measurements  obtained with angle correction at or less than 60 degrees.     Findings:     AORTA:       At renals: 67/7 cm/s, arterial monophasic     RIGHT KIDNEY:       Renal artery:            Origin: 109/20 cm/s            Mid: 118/20 cm/s            Hilum: 89/17 cm/s          Renal artery - aortic ratio: 1.76          Renal vein: 21 cm/s          Size: 3.8 x 4.5 x 8.3 cm          Cortical thickness: 1.1 cm          Segmental artery resistive index (superior / mid / inferior):  0.87 / 0.88 / 0.87          Parenchyma: Normal. No stone, mass, or hydronephrosis.     LEFT KIDNEY:       Renal artery:            Origin: 46/5 cm/s, 46/5 cm/s            Mid: 60/11 cm/s            Hilum: 55/9 cm/s          Renal artery - aortic ratio: 0.90          Renal vein: 21 cm/s          Size:3.5 x 3.7 x 8.9 cm          Cortical thickness: 0.9 cm          Segmental artery resistive index (superior / mid / inferior):  0.85 / 0.87 / 0.91          Parenchyma: Normal. No stone, mass, or hydronephrosis.     Bladder: Distended.                                                                      IMPRESSION:   1. No renal artery stenosis demonstrated.     2. Elevated segmental artery resistive indices suggest medical renal  disease.     3. Negative grayscale appearance of bilateral kidneys..     Guidelines:  Diagnostic criteria suggestive of > 60% diameter stenosis  Renal artery:       Peak systolic velocity > 180 cm/s       RAR > 3.5       Turbulent flow     Arcuate/Segmental artery Resistive Index Normal < 0.7     ARELI LAWSON MD        PAST MEDICAL HISTORY:  Past Medical History:   Diagnosis Date    Cardiac pacemaker in situ 10/07/2020    Type 2 diabetes mellitus without complication, without long-term current use of insulin (H) 10/07/2020     PAST SURGICAL  HISTORY:  Past Surgical History:   Procedure Laterality Date    IMPLANT PACEMAKER       MEDICATIONS:  Prescription Medications as of 9/30/2024         Rx Number Disp Refills Start End Last Dispensed Date Next Fill Date Owning Pharmacy    Continuous Glucose  (FREESTYLE ANURAG 14 DAY READER) STEVIE  1 each 0 8/27/2024 --   [facility] [address]    Sig: Use to read blood sugars as per 's instructions.    Class: E-Prescribe    Continuous Glucose Sensor (FREESTYLE ANURAG 14 DAY SENSOR) MISC  6 each 3 8/27/2024 --   [facility] [place] - [address]    Sig: Change every 14 days.    Class: E-Prescribe    lisinopril-hydrochlorothiazide (ZESTORETIC) 10-12.5 MG tablet  90 tablet 3 8/27/2024 --   [facility] [address] [place] [address]    Sig: Take 1 tablet by mouth daily.    Class: E-Prescribe    Route: Oral    Multiple Vitamins-Minerals (MULTIVITAMIN ADULT PO)  -- --  --   [facility]/pharmacy #[id number] [address] AT [facility]    Class: Historical    Route: Oral    rosuvastatin (CRESTOR) 10 MG tablet  90 tablet 3 8/27/2024 --   [facility] [address] [place] [address]    Sig: Take 1 tablet (10 mg) by mouth daily.    Class: E-Prescribe    Route: Oral           ALLERGIES:    No Known Allergies  REVIEW OF SYSTEMS:    A comprehensive review of systems was performed and found to be negative except as described here or above.  SOCIAL HISTORY:   Social History     Socioeconomic History    Marital status:      Spouse name: Not on file    Number of children: Not on file    Years of education: Not on file    Highest education level: Not on file   Occupational History    Not on file   Tobacco Use    Smoking status: Never     Passive exposure: Never (per pt)    Smokeless tobacco: Never   Substance and Sexual Activity    Alcohol use: Never    Drug use: Never    Sexual  activity: Not on file   Other Topics Concern    Not on file   Social History Narrative    Not on file     Social Determinants of Health     Financial Resource Strain: Low Risk  (5/14/2024)    Financial Resource Strain     Within the past 12 months, have you or your family members you live with been unable to get utilities (heat, electricity) when it was really needed?: No   Food Insecurity: Low Risk  (5/14/2024)    Food Insecurity     Within the past 12 months, did you worry that your food would run out before you got money to buy more?: No     Within the past 12 months, did the food you bought just not last and you didn t have money to get more?: No   Transportation Needs: Low Risk  (5/14/2024)    Transportation Needs     Within the past 12 months, has lack of transportation kept you from medical appointments, getting your medicines, non-medical meetings or appointments, work, or from getting things that you need?: No   Physical Activity: Not on file   Stress: Not on file   Social Connections: Not on file   Interpersonal Safety: Not on file   Housing Stability: Low Risk  (5/14/2024)    Housing Stability     Do you have housing? : Yes     Are you worried about losing your housing?: No     FAMILY MEDICAL HISTORY:   No family history on file.  PHYSICAL EXAM:   There were no vitals taken for this visit.  GENERAL APPEARANCE: alert and no distress  EYES: nonicteric  HENT: mouth without ulcers or lesions  NECK: supple, no adenopathy  RESP: lungs clear to auscultation   CV: regular rhythm, normal rate, no rub  ABDOMEN: soft, nontender, normal bowel sounds, no HSM   Extremities: no clubbing, cyanosis, or edema  MS: no evidence of inflammation in joints, no muscle tenderness  SKIN: no rash  NEURO: mentation intact and speech normal  PSYCH: affect normal/bright   LABS:   Recent Results (from the past 672 hour(s))   Hemoglobin    Collection Time: 09/30/24 12:39 PM   Result Value Ref Range    Hemoglobin 11.5 (L) 11.7 - 15.7  g/dL     CMP  Recent Labs   Lab Test 08/19/24  1512 11/21/23  1605 04/28/23  1550 03/09/22  0811    137 137 138   POTASSIUM 4.2 4.0 4.1 4.5   CHLORIDE 101 102 99 105   CO2 27 24 22 27   ANIONGAP 10 11 16* 6   *  125* 194* 138* 153*   BUN 31.2* 27.9* 26.6* 24   CR 1.26* 1.72* 1.57* 1.55*   GFRESTIMATED 42* 29* 33* 33*   OYGI 9.2 9.4 10.0 9.2   PHOS 2.5  --   --   --    PROTTOTAL 7.6  --   --   --    ALBUMIN 4.1  --   --   --    BILITOTAL 0.3  --   --   --    ALKPHOS 76  --   --   --    AST 29  --   --   --    ALT 24  --   --   --      CBC  Recent Labs   Lab Test 09/30/24  1239 08/19/24  1512   HGB 11.5* 11.4*   WBC  --  10.8   RBC  --  4.06   HCT  --  34.9*   MCV  --  86   MCH  --  28.1   MCHC  --  32.7   RDW  --  14.7   PLT  --  279     INRNo lab results found.  ABGNo lab results found.   URINE STUDIES  Recent Labs   Lab Test 08/19/24  1520 07/28/22  1552   COLOR Straw Yellow   APPEARANCE Clear Clear   URINEGLC Negative Negative   URINEBILI Negative Negative   URINEKETONE Negative Negative   SG 1.009 1.010   UBLD Negative Negative   URINEPH 5.5 7.0   PROTEIN Negative Negative   UROBILINOGEN  --  0.2   NITRITE Negative Negative   LEUKEST Negative Negative   RBCU <1  --    WBCU <1  --      No lab results found.    ASSESSMENT AND PLAN:   #CKD stage 3 with potentially contributing factors type 2 diabetes, hypertension, vascular disease, cardiorenal syndrome and decline related to age. Renal ultrasound shows small atrophic kidneys. BP and glycemia are currently well controlled. The patient was instructed to keep the sodium intake around 2300 mg /day, follow a plant-based diet and to avoid NSAIDs     #HTN  Primary and secondary to CKD. Management as per above.     #Type 2 DM   Hba1c in November 2023 off medications is 7.5 and 6.9 in August 2024. Pursue current management.    #CVD/dyslipidemia/PVD  On  rosuvastatin as indicated for any patient with CKD above the age of 50     #Blood count  Hemoglobin level is  11.5, will order iron studies    #Acid-base status  CO2 level 24 -> 27 no need for supplementation  No need for sodium bicarbonate supplementation    #Electrolytes  Na 137  K 4  No acute issues    #CKD journey/transplant not a candidate at this point due to low risk of progression    The total time of this encounter amounted to 30 minutes on the day of the encounter. This time included time spent with the patient, reviewing records, ordering tests, and performing post visit documentation.     The patient will return to follow up in 6 months    Nafisa Aparicio MD  Division of Renal Disease and Hypertension  August 19, 2024  1:52 PM

## 2024-10-01 LAB
FERRITIN SERPL-MCNC: 401 NG/ML (ref 11–328)
IRON BINDING CAPACITY (ROCHE): 298 UG/DL (ref 240–430)
IRON SATN MFR SERPL: 22 % (ref 15–46)
IRON SERPL-MCNC: 66 UG/DL (ref 37–145)

## 2024-10-23 ENCOUNTER — TRANSFERRED RECORDS (OUTPATIENT)
Dept: HEALTH INFORMATION MANAGEMENT | Facility: CLINIC | Age: 84
End: 2024-10-23
Payer: MEDICARE

## 2024-10-30 ENCOUNTER — TELEPHONE (OUTPATIENT)
Dept: CARDIOLOGY | Facility: CLINIC | Age: 84
End: 2024-10-30
Payer: MEDICARE

## 2024-10-30 NOTE — TELEPHONE ENCOUNTER
Left Voicemail (1st Attempt) for the patient to call back and schedule the following:    Appointment type: rtn ep   Provider: juventino  Return date: 02/22/25  Specialty phone number: [phone] opt 1  Additional appointment(s) needed: n/a   Additonal Notes: n/a

## 2024-11-01 NOTE — TELEPHONE ENCOUNTER
Left Voicemail (2nd Attempt) for the patient to call back and schedule the following:     Appointment type: rtn ep   Provider: juventino  Return date: 02/22/25  Specialty phone number: [phone] opt 1  Additional appointment(s) needed: n/a   Additonal Notes: n/a

## 2024-12-01 ENCOUNTER — HEALTH MAINTENANCE LETTER (OUTPATIENT)
Age: 84
End: 2024-12-01

## 2024-12-05 ENCOUNTER — ANCILLARY PROCEDURE (OUTPATIENT)
Dept: CARDIOLOGY | Facility: CLINIC | Age: 84
End: 2024-12-05
Attending: INTERNAL MEDICINE
Payer: MEDICARE

## 2024-12-05 DIAGNOSIS — I44.30 ATRIOVENTRICULAR BLOCK: ICD-10-CM

## 2024-12-05 PROCEDURE — 93294 REM INTERROG EVL PM/LDLS PM: CPT | Performed by: INTERNAL MEDICINE

## 2024-12-05 PROCEDURE — 93296 REM INTERROG EVL PM/IDS: CPT

## 2024-12-26 LAB
MDC_IDC_LEAD_CONNECTION_STATUS: NORMAL
MDC_IDC_LEAD_CONNECTION_STATUS: NORMAL
MDC_IDC_LEAD_IMPLANT_DT: NORMAL
MDC_IDC_LEAD_IMPLANT_DT: NORMAL
MDC_IDC_LEAD_LOCATION: NORMAL
MDC_IDC_LEAD_LOCATION: NORMAL
MDC_IDC_LEAD_LOCATION_DETAIL_1: NORMAL
MDC_IDC_LEAD_LOCATION_DETAIL_1: NORMAL
MDC_IDC_LEAD_MFG: NORMAL
MDC_IDC_LEAD_MFG: NORMAL
MDC_IDC_LEAD_MODEL: NORMAL
MDC_IDC_LEAD_MODEL: NORMAL
MDC_IDC_LEAD_POLARITY_TYPE: NORMAL
MDC_IDC_LEAD_POLARITY_TYPE: NORMAL
MDC_IDC_LEAD_SERIAL: NORMAL
MDC_IDC_LEAD_SERIAL: NORMAL
MDC_IDC_LEAD_SPECIAL_FUNCTION: NORMAL
MDC_IDC_LEAD_SPECIAL_FUNCTION: NORMAL
MDC_IDC_MSMT_BATTERY_DTM: NORMAL
MDC_IDC_MSMT_BATTERY_REMAINING_LONGEVITY: 102 MO
MDC_IDC_MSMT_BATTERY_RRT_TRIGGER: 2.62
MDC_IDC_MSMT_BATTERY_STATUS: NORMAL
MDC_IDC_MSMT_BATTERY_VOLTAGE: 2.99 V
MDC_IDC_MSMT_LEADCHNL_RA_IMPEDANCE_VALUE: 456 OHM
MDC_IDC_MSMT_LEADCHNL_RA_IMPEDANCE_VALUE: 494 OHM
MDC_IDC_MSMT_LEADCHNL_RA_PACING_THRESHOLD_AMPLITUDE: 0.38 V
MDC_IDC_MSMT_LEADCHNL_RA_PACING_THRESHOLD_PULSEWIDTH: 0.4 MS
MDC_IDC_MSMT_LEADCHNL_RA_SENSING_INTR_AMPL: 3.38 MV
MDC_IDC_MSMT_LEADCHNL_RA_SENSING_INTR_AMPL: 3.38 MV
MDC_IDC_MSMT_LEADCHNL_RV_IMPEDANCE_VALUE: 304 OHM
MDC_IDC_MSMT_LEADCHNL_RV_IMPEDANCE_VALUE: 418 OHM
MDC_IDC_MSMT_LEADCHNL_RV_PACING_THRESHOLD_AMPLITUDE: 1 V
MDC_IDC_MSMT_LEADCHNL_RV_PACING_THRESHOLD_PULSEWIDTH: 0.4 MS
MDC_IDC_MSMT_LEADCHNL_RV_SENSING_INTR_AMPL: 3.88 MV
MDC_IDC_MSMT_LEADCHNL_RV_SENSING_INTR_AMPL: 3.88 MV
MDC_IDC_PG_IMPLANT_DTM: NORMAL
MDC_IDC_PG_MFG: NORMAL
MDC_IDC_PG_MODEL: NORMAL
MDC_IDC_PG_SERIAL: NORMAL
MDC_IDC_PG_TYPE: NORMAL
MDC_IDC_SESS_CLINIC_NAME: NORMAL
MDC_IDC_SESS_DTM: NORMAL
MDC_IDC_SESS_TYPE: NORMAL
MDC_IDC_SET_BRADY_AT_MODE_SWITCH_RATE: 171 {BEATS}/MIN
MDC_IDC_SET_BRADY_HYSTRATE: NORMAL
MDC_IDC_SET_BRADY_LOWRATE: 60 {BEATS}/MIN
MDC_IDC_SET_BRADY_MAX_SENSOR_RATE: 120 {BEATS}/MIN
MDC_IDC_SET_BRADY_MAX_TRACKING_RATE: 120 {BEATS}/MIN
MDC_IDC_SET_BRADY_MODE: NORMAL
MDC_IDC_SET_BRADY_PAV_DELAY_LOW: 220 MS
MDC_IDC_SET_BRADY_SAV_DELAY_LOW: 200 MS
MDC_IDC_SET_LEADCHNL_RA_PACING_AMPLITUDE: 1.5 V
MDC_IDC_SET_LEADCHNL_RA_PACING_ANODE_ELECTRODE_1: NORMAL
MDC_IDC_SET_LEADCHNL_RA_PACING_ANODE_LOCATION_1: NORMAL
MDC_IDC_SET_LEADCHNL_RA_PACING_CAPTURE_MODE: NORMAL
MDC_IDC_SET_LEADCHNL_RA_PACING_CATHODE_ELECTRODE_1: NORMAL
MDC_IDC_SET_LEADCHNL_RA_PACING_CATHODE_LOCATION_1: NORMAL
MDC_IDC_SET_LEADCHNL_RA_PACING_POLARITY: NORMAL
MDC_IDC_SET_LEADCHNL_RA_PACING_PULSEWIDTH: 0.4 MS
MDC_IDC_SET_LEADCHNL_RA_SENSING_ANODE_ELECTRODE_1: NORMAL
MDC_IDC_SET_LEADCHNL_RA_SENSING_ANODE_LOCATION_1: NORMAL
MDC_IDC_SET_LEADCHNL_RA_SENSING_CATHODE_ELECTRODE_1: NORMAL
MDC_IDC_SET_LEADCHNL_RA_SENSING_CATHODE_LOCATION_1: NORMAL
MDC_IDC_SET_LEADCHNL_RA_SENSING_POLARITY: NORMAL
MDC_IDC_SET_LEADCHNL_RA_SENSING_SENSITIVITY: 0.3 MV
MDC_IDC_SET_LEADCHNL_RV_PACING_AMPLITUDE: 2 V
MDC_IDC_SET_LEADCHNL_RV_PACING_ANODE_ELECTRODE_1: NORMAL
MDC_IDC_SET_LEADCHNL_RV_PACING_ANODE_LOCATION_1: NORMAL
MDC_IDC_SET_LEADCHNL_RV_PACING_CAPTURE_MODE: NORMAL
MDC_IDC_SET_LEADCHNL_RV_PACING_CATHODE_ELECTRODE_1: NORMAL
MDC_IDC_SET_LEADCHNL_RV_PACING_CATHODE_LOCATION_1: NORMAL
MDC_IDC_SET_LEADCHNL_RV_PACING_POLARITY: NORMAL
MDC_IDC_SET_LEADCHNL_RV_PACING_PULSEWIDTH: 0.4 MS
MDC_IDC_SET_LEADCHNL_RV_SENSING_ANODE_ELECTRODE_1: NORMAL
MDC_IDC_SET_LEADCHNL_RV_SENSING_ANODE_LOCATION_1: NORMAL
MDC_IDC_SET_LEADCHNL_RV_SENSING_CATHODE_ELECTRODE_1: NORMAL
MDC_IDC_SET_LEADCHNL_RV_SENSING_CATHODE_LOCATION_1: NORMAL
MDC_IDC_SET_LEADCHNL_RV_SENSING_POLARITY: NORMAL
MDC_IDC_SET_LEADCHNL_RV_SENSING_SENSITIVITY: 0.9 MV
MDC_IDC_SET_ZONE_DETECTION_INTERVAL: 350 MS
MDC_IDC_SET_ZONE_DETECTION_INTERVAL: 400 MS
MDC_IDC_SET_ZONE_STATUS: NORMAL
MDC_IDC_SET_ZONE_STATUS: NORMAL
MDC_IDC_SET_ZONE_TYPE: NORMAL
MDC_IDC_SET_ZONE_VENDOR_TYPE: NORMAL
MDC_IDC_STAT_AT_BURDEN_PERCENT: 0.1 %
MDC_IDC_STAT_AT_DTM_END: NORMAL
MDC_IDC_STAT_AT_DTM_START: NORMAL
MDC_IDC_STAT_BRADY_AP_VP_PERCENT: 33.89 %
MDC_IDC_STAT_BRADY_AP_VS_PERCENT: 0 %
MDC_IDC_STAT_BRADY_AS_VP_PERCENT: 66.07 %
MDC_IDC_STAT_BRADY_AS_VS_PERCENT: 0.04 %
MDC_IDC_STAT_BRADY_DTM_END: NORMAL
MDC_IDC_STAT_BRADY_DTM_START: NORMAL
MDC_IDC_STAT_BRADY_RA_PERCENT_PACED: 33.87 %
MDC_IDC_STAT_BRADY_RV_PERCENT_PACED: 99.96 %
MDC_IDC_STAT_EPISODE_RECENT_COUNT: 0
MDC_IDC_STAT_EPISODE_RECENT_COUNT_DTM_END: NORMAL
MDC_IDC_STAT_EPISODE_RECENT_COUNT_DTM_START: NORMAL
MDC_IDC_STAT_EPISODE_TOTAL_COUNT: 0
MDC_IDC_STAT_EPISODE_TOTAL_COUNT: 265
MDC_IDC_STAT_EPISODE_TOTAL_COUNT: 3
MDC_IDC_STAT_EPISODE_TOTAL_COUNT_DTM_END: NORMAL
MDC_IDC_STAT_EPISODE_TOTAL_COUNT_DTM_START: NORMAL
MDC_IDC_STAT_EPISODE_TYPE: NORMAL
MDC_IDC_STAT_TACHYTHERAPY_RECENT_DTM_END: NORMAL
MDC_IDC_STAT_TACHYTHERAPY_RECENT_DTM_START: NORMAL
MDC_IDC_STAT_TACHYTHERAPY_TOTAL_DTM_END: NORMAL
MDC_IDC_STAT_TACHYTHERAPY_TOTAL_DTM_START: NORMAL

## 2025-03-15 ENCOUNTER — HEALTH MAINTENANCE LETTER (OUTPATIENT)
Age: 85
End: 2025-03-15

## 2025-03-17 ENCOUNTER — ANCILLARY PROCEDURE (OUTPATIENT)
Dept: CARDIOLOGY | Facility: CLINIC | Age: 85
End: 2025-03-17
Attending: INTERNAL MEDICINE
Payer: MEDICARE

## 2025-03-17 DIAGNOSIS — I44.30 ATRIOVENTRICULAR BLOCK: ICD-10-CM

## 2025-03-17 LAB
MDC_IDC_LEAD_CONNECTION_STATUS: NORMAL
MDC_IDC_LEAD_CONNECTION_STATUS: NORMAL
MDC_IDC_LEAD_IMPLANT_DT: NORMAL
MDC_IDC_LEAD_IMPLANT_DT: NORMAL
MDC_IDC_LEAD_LOCATION: NORMAL
MDC_IDC_LEAD_LOCATION: NORMAL
MDC_IDC_LEAD_LOCATION_DETAIL_1: NORMAL
MDC_IDC_LEAD_LOCATION_DETAIL_1: NORMAL
MDC_IDC_LEAD_MFG: NORMAL
MDC_IDC_LEAD_MFG: NORMAL
MDC_IDC_LEAD_MODEL: NORMAL
MDC_IDC_LEAD_MODEL: NORMAL
MDC_IDC_LEAD_POLARITY_TYPE: NORMAL
MDC_IDC_LEAD_POLARITY_TYPE: NORMAL
MDC_IDC_LEAD_SERIAL: NORMAL
MDC_IDC_LEAD_SERIAL: NORMAL
MDC_IDC_LEAD_SPECIAL_FUNCTION: NORMAL
MDC_IDC_LEAD_SPECIAL_FUNCTION: NORMAL
MDC_IDC_MSMT_BATTERY_DTM: NORMAL
MDC_IDC_MSMT_BATTERY_REMAINING_LONGEVITY: 103 MO
MDC_IDC_MSMT_BATTERY_RRT_TRIGGER: 2.62
MDC_IDC_MSMT_BATTERY_STATUS: NORMAL
MDC_IDC_MSMT_BATTERY_VOLTAGE: 2.99 V
MDC_IDC_MSMT_LEADCHNL_RA_IMPEDANCE_VALUE: 456 OHM
MDC_IDC_MSMT_LEADCHNL_RA_IMPEDANCE_VALUE: 475 OHM
MDC_IDC_MSMT_LEADCHNL_RA_PACING_THRESHOLD_AMPLITUDE: 0.38 V
MDC_IDC_MSMT_LEADCHNL_RA_PACING_THRESHOLD_PULSEWIDTH: 0.4 MS
MDC_IDC_MSMT_LEADCHNL_RA_SENSING_INTR_AMPL: 3.75 MV
MDC_IDC_MSMT_LEADCHNL_RA_SENSING_INTR_AMPL: 3.75 MV
MDC_IDC_MSMT_LEADCHNL_RV_IMPEDANCE_VALUE: 323 OHM
MDC_IDC_MSMT_LEADCHNL_RV_IMPEDANCE_VALUE: 418 OHM
MDC_IDC_MSMT_LEADCHNL_RV_PACING_THRESHOLD_AMPLITUDE: 0.88 V
MDC_IDC_MSMT_LEADCHNL_RV_PACING_THRESHOLD_PULSEWIDTH: 0.4 MS
MDC_IDC_PG_IMPLANT_DTM: NORMAL
MDC_IDC_PG_MFG: NORMAL
MDC_IDC_PG_MODEL: NORMAL
MDC_IDC_PG_SERIAL: NORMAL
MDC_IDC_PG_TYPE: NORMAL
MDC_IDC_SESS_CLINIC_NAME: NORMAL
MDC_IDC_SESS_DTM: NORMAL
MDC_IDC_SESS_TYPE: NORMAL
MDC_IDC_SET_BRADY_AT_MODE_SWITCH_RATE: 171 {BEATS}/MIN
MDC_IDC_SET_BRADY_HYSTRATE: NORMAL
MDC_IDC_SET_BRADY_LOWRATE: 60 {BEATS}/MIN
MDC_IDC_SET_BRADY_MAX_SENSOR_RATE: 120 {BEATS}/MIN
MDC_IDC_SET_BRADY_MAX_TRACKING_RATE: 120 {BEATS}/MIN
MDC_IDC_SET_BRADY_MODE: NORMAL
MDC_IDC_SET_BRADY_PAV_DELAY_LOW: 220 MS
MDC_IDC_SET_BRADY_SAV_DELAY_LOW: 200 MS
MDC_IDC_SET_LEADCHNL_RA_PACING_AMPLITUDE: 1.5 V
MDC_IDC_SET_LEADCHNL_RA_PACING_ANODE_ELECTRODE_1: NORMAL
MDC_IDC_SET_LEADCHNL_RA_PACING_ANODE_LOCATION_1: NORMAL
MDC_IDC_SET_LEADCHNL_RA_PACING_CAPTURE_MODE: NORMAL
MDC_IDC_SET_LEADCHNL_RA_PACING_CATHODE_ELECTRODE_1: NORMAL
MDC_IDC_SET_LEADCHNL_RA_PACING_CATHODE_LOCATION_1: NORMAL
MDC_IDC_SET_LEADCHNL_RA_PACING_POLARITY: NORMAL
MDC_IDC_SET_LEADCHNL_RA_PACING_PULSEWIDTH: 0.4 MS
MDC_IDC_SET_LEADCHNL_RA_SENSING_ANODE_ELECTRODE_1: NORMAL
MDC_IDC_SET_LEADCHNL_RA_SENSING_ANODE_LOCATION_1: NORMAL
MDC_IDC_SET_LEADCHNL_RA_SENSING_CATHODE_ELECTRODE_1: NORMAL
MDC_IDC_SET_LEADCHNL_RA_SENSING_CATHODE_LOCATION_1: NORMAL
MDC_IDC_SET_LEADCHNL_RA_SENSING_POLARITY: NORMAL
MDC_IDC_SET_LEADCHNL_RA_SENSING_SENSITIVITY: 0.3 MV
MDC_IDC_SET_LEADCHNL_RV_PACING_AMPLITUDE: 1.75 V
MDC_IDC_SET_LEADCHNL_RV_PACING_ANODE_ELECTRODE_1: NORMAL
MDC_IDC_SET_LEADCHNL_RV_PACING_ANODE_LOCATION_1: NORMAL
MDC_IDC_SET_LEADCHNL_RV_PACING_CAPTURE_MODE: NORMAL
MDC_IDC_SET_LEADCHNL_RV_PACING_CATHODE_ELECTRODE_1: NORMAL
MDC_IDC_SET_LEADCHNL_RV_PACING_CATHODE_LOCATION_1: NORMAL
MDC_IDC_SET_LEADCHNL_RV_PACING_POLARITY: NORMAL
MDC_IDC_SET_LEADCHNL_RV_PACING_PULSEWIDTH: 0.4 MS
MDC_IDC_SET_LEADCHNL_RV_SENSING_ANODE_ELECTRODE_1: NORMAL
MDC_IDC_SET_LEADCHNL_RV_SENSING_ANODE_LOCATION_1: NORMAL
MDC_IDC_SET_LEADCHNL_RV_SENSING_CATHODE_ELECTRODE_1: NORMAL
MDC_IDC_SET_LEADCHNL_RV_SENSING_CATHODE_LOCATION_1: NORMAL
MDC_IDC_SET_LEADCHNL_RV_SENSING_POLARITY: NORMAL
MDC_IDC_SET_LEADCHNL_RV_SENSING_SENSITIVITY: 0.9 MV
MDC_IDC_SET_ZONE_DETECTION_INTERVAL: 350 MS
MDC_IDC_SET_ZONE_DETECTION_INTERVAL: 400 MS
MDC_IDC_SET_ZONE_STATUS: NORMAL
MDC_IDC_SET_ZONE_STATUS: NORMAL
MDC_IDC_SET_ZONE_TYPE: NORMAL
MDC_IDC_SET_ZONE_VENDOR_TYPE: NORMAL
MDC_IDC_STAT_AT_BURDEN_PERCENT: 0 %
MDC_IDC_STAT_AT_DTM_END: NORMAL
MDC_IDC_STAT_AT_DTM_START: NORMAL
MDC_IDC_STAT_BRADY_AP_VP_PERCENT: 33.77 %
MDC_IDC_STAT_BRADY_AP_VS_PERCENT: 0 %
MDC_IDC_STAT_BRADY_AS_VP_PERCENT: 66.2 %
MDC_IDC_STAT_BRADY_AS_VS_PERCENT: 0.03 %
MDC_IDC_STAT_BRADY_DTM_END: NORMAL
MDC_IDC_STAT_BRADY_DTM_START: NORMAL
MDC_IDC_STAT_BRADY_RA_PERCENT_PACED: 33.75 %
MDC_IDC_STAT_BRADY_RV_PERCENT_PACED: 99.97 %
MDC_IDC_STAT_EPISODE_RECENT_COUNT: 0
MDC_IDC_STAT_EPISODE_RECENT_COUNT_DTM_END: NORMAL
MDC_IDC_STAT_EPISODE_RECENT_COUNT_DTM_START: NORMAL
MDC_IDC_STAT_EPISODE_TOTAL_COUNT: 0
MDC_IDC_STAT_EPISODE_TOTAL_COUNT: 265
MDC_IDC_STAT_EPISODE_TOTAL_COUNT: 3
MDC_IDC_STAT_EPISODE_TOTAL_COUNT_DTM_END: NORMAL
MDC_IDC_STAT_EPISODE_TOTAL_COUNT_DTM_START: NORMAL
MDC_IDC_STAT_EPISODE_TYPE: NORMAL
MDC_IDC_STAT_TACHYTHERAPY_RECENT_DTM_END: NORMAL
MDC_IDC_STAT_TACHYTHERAPY_RECENT_DTM_START: NORMAL
MDC_IDC_STAT_TACHYTHERAPY_TOTAL_DTM_END: NORMAL
MDC_IDC_STAT_TACHYTHERAPY_TOTAL_DTM_START: NORMAL

## 2025-03-17 PROCEDURE — 93296 REM INTERROG EVL PM/IDS: CPT

## 2025-03-17 PROCEDURE — 93294 REM INTERROG EVL PM/LDLS PM: CPT | Performed by: INTERNAL MEDICINE

## 2025-04-09 ENCOUNTER — LAB (OUTPATIENT)
Dept: LAB | Facility: CLINIC | Age: 85
End: 2025-04-09
Payer: MEDICARE

## 2025-04-09 ENCOUNTER — OFFICE VISIT (OUTPATIENT)
Dept: NEPHROLOGY | Facility: CLINIC | Age: 85
End: 2025-04-09
Attending: INTERNAL MEDICINE
Payer: MEDICARE

## 2025-04-09 VITALS
WEIGHT: 123.2 LBS | SYSTOLIC BLOOD PRESSURE: 141 MMHG | TEMPERATURE: 98.2 F | OXYGEN SATURATION: 100 % | BODY MASS INDEX: 23.28 KG/M2 | DIASTOLIC BLOOD PRESSURE: 73 MMHG | HEART RATE: 68 BPM

## 2025-04-09 DIAGNOSIS — N18.30 CHRONIC KIDNEY DISEASE (CKD), ACTIVE MEDICAL MANAGEMENT WITHOUT DIALYSIS, STAGE 3 (MODERATE) (H): ICD-10-CM

## 2025-04-09 DIAGNOSIS — E78.5 DYSLIPIDEMIA: ICD-10-CM

## 2025-04-09 DIAGNOSIS — E11.59 TYPE 2 DIABETES MELLITUS WITH OTHER CIRCULATORY COMPLICATION, UNSPECIFIED WHETHER LONG TERM INSULIN USE (H): ICD-10-CM

## 2025-04-09 DIAGNOSIS — E55.9 VITAMIN D DEFICIENCY: Primary | ICD-10-CM

## 2025-04-09 DIAGNOSIS — E55.9 VITAMIN D DEFICIENCY: ICD-10-CM

## 2025-04-09 LAB
ALBUMIN MFR UR ELPH: <6 MG/DL
ALBUMIN SERPL BCG-MCNC: 4.3 G/DL (ref 3.5–5.2)
ALBUMIN UR-MCNC: NEGATIVE MG/DL
ALP SERPL-CCNC: 80 U/L (ref 40–150)
ALT SERPL W P-5'-P-CCNC: 78 U/L (ref 0–50)
ANION GAP SERPL CALCULATED.3IONS-SCNC: 12 MMOL/L (ref 7–15)
APPEARANCE UR: CLEAR
AST SERPL W P-5'-P-CCNC: 50 U/L (ref 0–45)
BILIRUB SERPL-MCNC: 0.4 MG/DL
BILIRUB UR QL STRIP: NEGATIVE
BUN SERPL-MCNC: 33.1 MG/DL (ref 8–23)
CALCIUM SERPL-MCNC: 9.8 MG/DL (ref 8.8–10.4)
CHLORIDE SERPL-SCNC: 101 MMOL/L (ref 98–107)
CK SERPL-CCNC: 198 U/L (ref 26–192)
COLOR UR AUTO: NORMAL
CREAT SERPL-MCNC: 1.31 MG/DL (ref 0.51–0.95)
CREAT UR-MCNC: 45.7 MG/DL
EGFRCR SERPLBLD CKD-EPI 2021: 40 ML/MIN/1.73M2
ERYTHROCYTE [DISTWIDTH] IN BLOOD BY AUTOMATED COUNT: 14.6 % (ref 10–15)
EST. AVERAGE GLUCOSE BLD GHB EST-MCNC: 146 MG/DL
FASTING STATUS PATIENT QL REPORTED: NO
FASTING STATUS PATIENT QL REPORTED: NO
GLUCOSE SERPL-MCNC: 152 MG/DL (ref 70–99)
GLUCOSE SERPL-MCNC: 152 MG/DL (ref 70–99)
GLUCOSE UR STRIP-MCNC: NEGATIVE MG/DL
HBA1C MFR BLD: 6.7 %
HCO3 SERPL-SCNC: 27 MMOL/L (ref 22–29)
HCT VFR BLD AUTO: 35.2 % (ref 35–47)
HGB BLD-MCNC: 11.6 G/DL (ref 11.7–15.7)
HGB UR QL STRIP: NEGATIVE
KETONES UR STRIP-MCNC: NEGATIVE MG/DL
LEUKOCYTE ESTERASE UR QL STRIP: NEGATIVE
MCH RBC QN AUTO: 28 PG (ref 26.5–33)
MCHC RBC AUTO-ENTMCNC: 33 G/DL (ref 31.5–36.5)
MCV RBC AUTO: 85 FL (ref 78–100)
NITRATE UR QL: NEGATIVE
PH UR STRIP: 5.5 [PH] (ref 5–7)
PLATELET # BLD AUTO: 258 10E3/UL (ref 150–450)
POTASSIUM SERPL-SCNC: 4 MMOL/L (ref 3.4–5.3)
PROT SERPL-MCNC: 7.9 G/DL (ref 6.4–8.3)
PROT/CREAT 24H UR: NORMAL MG/G{CREAT}
RBC # BLD AUTO: 4.15 10E6/UL (ref 3.8–5.2)
SODIUM SERPL-SCNC: 140 MMOL/L (ref 135–145)
SP GR UR STRIP: 1.01 (ref 1–1.03)
UROBILINOGEN UR STRIP-MCNC: NORMAL MG/DL
VIT D+METAB SERPL-MCNC: 54 NG/ML (ref 20–50)
WBC # BLD AUTO: 7.9 10E3/UL (ref 4–11)

## 2025-04-09 PROCEDURE — 36415 COLL VENOUS BLD VENIPUNCTURE: CPT | Performed by: PATHOLOGY

## 2025-04-09 PROCEDURE — 82550 ASSAY OF CK (CPK): CPT | Performed by: PATHOLOGY

## 2025-04-09 PROCEDURE — G0463 HOSPITAL OUTPT CLINIC VISIT: HCPCS | Performed by: INTERNAL MEDICINE

## 2025-04-09 PROCEDURE — 84156 ASSAY OF PROTEIN URINE: CPT | Performed by: PATHOLOGY

## 2025-04-09 PROCEDURE — 82306 VITAMIN D 25 HYDROXY: CPT | Performed by: INTERNAL MEDICINE

## 2025-04-09 PROCEDURE — 83036 HEMOGLOBIN GLYCOSYLATED A1C: CPT | Performed by: INTERNAL MEDICINE

## 2025-04-09 PROCEDURE — 81003 URINALYSIS AUTO W/O SCOPE: CPT | Performed by: PATHOLOGY

## 2025-04-09 PROCEDURE — 99000 SPECIMEN HANDLING OFFICE-LAB: CPT | Performed by: PATHOLOGY

## 2025-04-09 PROCEDURE — 80053 COMPREHEN METABOLIC PANEL: CPT | Performed by: PATHOLOGY

## 2025-04-09 PROCEDURE — 85027 COMPLETE CBC AUTOMATED: CPT | Performed by: PATHOLOGY

## 2025-04-09 ASSESSMENT — PAIN SCALES - GENERAL: PAINLEVEL_OUTOF10: NO PAIN (0)

## 2025-04-09 NOTE — LETTER
4/9/2025       RE: Ashley Lynn  [address] [facility] [address]     Dear Colleague,    Thank you for referring your patient, Ashley Lynn, to the [facility] [place] at [facility]. Please see a copy of my visit note below.    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  From a renal standpoint, it is unclear when CKD started as her kidney function has been infrequently checked. Her creatinine level was  1.1 mg/dL in 2020, not measured in 2021 two measures are available in 2022 at 1.89 and 1.55 mg/dL. It was measured twice in 2023 and was 1.57 mg/dL in April 2023 and 1.72 mg/dL in November 2023,  1.26 mg/dL on 8/19/2024,  1.39 mg/dL on 9/30/2024 and 1.31 mg/dL on 4/9/2025. A urinalysis done in July 2022 shows no evidence of albuminuria, hematuria or leucocyturia. The uPCR is 0.15 mg/mg Cr in August 2024, 0.1 mg/mg Cr in September 2024 and negative for any proteinuria in April 2025. A renal ultrasound done in August 2024 shows bilateral atrophic kidneys. For hypertension, the patient is lisinopril-hydrochlorothiazide 10-12.5 mg daily. The blood pressure has been well controlled. The diabetes has been in general well controlled with diet only with an Hba1c at 7.5 in November 2023 and at 6.9 in August 2024.  The following portions of the patient's history were reviewed and updated as  appropriate: allergies, current medications, past family history, past medical history, past social history, past surgical history and problem list.    ULTRASOUND RENAL COMPLETE WITH DOPPLER 8/27/2024 8:33 AM     CLINICAL HISTORY: Chronic renal disease.  Hypertension.     COMPARISONS: None available.     ORDERING PROVIDER: ASIM BARRAZA     TECHNIQUE: B-mode (grayscale) and duplex Doppler evaluation of the  abdominal aorta and renal arteries performed. Velocity measurements  obtained with angle correction at or less than 60 degrees.     Findings:     AORTA:       At renals: 67/7 cm/s, arterial monophasic     RIGHT KIDNEY:       Renal artery:            Origin: 109/20 cm/s            Mid: 118/20 cm/s            Hilum: 89/17 cm/s          Renal artery - aortic ratio: 1.76          Renal vein: 21 cm/s          Size: 3.8 x 4.5 x 8.3 cm          Cortical thickness: 1.1 cm          Segmental artery resistive index (superior / mid / inferior):  0.87 / 0.88 / 0.87          Parenchyma: Normal. No stone, mass, or hydronephrosis.     LEFT KIDNEY:       Renal artery:            Origin: 46/5 cm/s, 46/5 cm/s            Mid: 60/11 cm/s            Hilum: 55/9 cm/s          Renal artery - aortic ratio: 0.90          Renal vein: 21 cm/s          Size:3.5 x 3.7 x 8.9 cm          Cortical thickness: 0.9 cm          Segmental artery resistive index (superior / mid / inferior):  0.85 / 0.87 / 0.91          Parenchyma: Normal. No stone, mass, or hydronephrosis.     Bladder: Distended.                                                                      IMPRESSION:   1. No renal artery stenosis demonstrated.     2. Elevated segmental artery resistive indices suggest medical renal  disease.     3. Negative grayscale appearance of bilateral kidneys..     Guidelines:  Diagnostic criteria suggestive of > 60% diameter stenosis  Renal artery:       Peak systolic velocity > 180 cm/s       RAR > 3.5       Turbulent flow     Arcuate/Segmental  artery Resistive Index Normal < 0.7     ARELI LAWSON MD        PAST MEDICAL HISTORY:  Past Medical History:   Diagnosis Date     Cardiac pacemaker in situ 10/07/2020     Type 2 diabetes mellitus without complication, without long-term current use of insulin (H) 10/07/2020     PAST SURGICAL HISTORY:  Past Surgical History:   Procedure Laterality Date     IMPLANT PACEMAKER       MEDICATIONS:  Prescription Medications as of 4/9/2025         Rx Number Disp Refills Start End Last Dispensed Date Next Fill Date Owning Pharmacy    Continuous Glucose  (FREESTYLE ANURAG 14 DAY READER) STEVIE  1 each 0 8/27/2024 --   [facility] [address]    Sig: Use to read blood sugars as per 's instructions.    Class: E-Prescribe    Continuous Glucose Sensor (FREESTYLE ANURAG 14 DAY SENSOR) MISC  6 each 3 8/27/2024 --   [facility] [place] - [address]    Sig: Change every 14 days.    Class: E-Prescribe    lisinopril-hydrochlorothiazide (ZESTORETIC) 10-12.5 MG tablet  90 tablet 3 8/27/2024 --   [facility] [address] [place] [address]    Sig: Take 1 tablet by mouth daily.    Class: E-Prescribe    Route: Oral    Multiple Vitamins-Minerals (MULTIVITAMIN ADULT PO)  -- --  --   [facility]/pharmacy #[id number] - [address] AT [facility]    Class: Historical    Route: Oral    rosuvastatin (CRESTOR) 10 MG tablet  90 tablet 3 8/27/2024 --   [address]    Sig: Take 1 tablet (10 mg) by mouth daily.    Class: E-Prescribe    Route: Oral           ALLERGIES:    No Known Allergies  REVIEW OF SYSTEMS:    A comprehensive review of systems was performed and found to be negative except as described here or above.  SOCIAL HISTORY:   Social History     Socioeconomic History     Marital status:      Spouse name: Not on file     Number of children: Not on file     Years  of education: Not on file     Highest education level: Not on file   Occupational History     Not on file   Tobacco Use     Smoking status: Never     Passive exposure: Never (per pt)     Smokeless tobacco: Never   Vaping Use     Vaping status: Never Used   Substance and Sexual Activity     Alcohol use: Never     Drug use: Never     Sexual activity: Not on file   Other Topics Concern     Not on file   Social History Narrative     Not on file     Social Drivers of Health     Financial Resource Strain: Low Risk  (5/14/2024)    Financial Resource Strain      Within the past 12 months, have you or your family members you live with been unable to get utilities (heat, electricity) when it was really needed?: No   Food Insecurity: Low Risk  (5/14/2024)    Food Insecurity      Within the past 12 months, did you worry that your food would run out before you got money to buy more?: No      Within the past 12 months, did the food you bought just not last and you didn t have money to get more?: No   Transportation Needs: Low Risk  (5/14/2024)    Transportation Needs      Within the past 12 months, has lack of transportation kept you from medical appointments, getting your medicines, non-medical meetings or appointments, work, or from getting things that you need?: No   Physical Activity: Not on file   Stress: Not on file   Social Connections: Not on file   Interpersonal Safety: Not on file   Housing Stability: Low Risk  (5/14/2024)    Housing Stability      Do you have housing? : Yes      Are you worried about losing your housing?: No     FAMILY MEDICAL HISTORY:   No family history on file.  PHYSICAL EXAM:   There were no vitals taken for this visit.  GENERAL APPEARANCE: alert and no distress  EYES: nonicteric  HENT: mouth without ulcers or lesions  NECK: supple, no adenopathy  RESP: lungs clear to auscultation   CV: regular rhythm, normal rate, no rub  ABDOMEN: soft, nontender, normal bowel sounds, no HSM   Extremities: no  clubbing, cyanosis, or edema  MS: no evidence of inflammation in joints, no muscle tenderness  SKIN: no rash  NEURO: mentation intact and speech normal  PSYCH: affect normal/bright   LABS:   Recent Results (from the past 4 weeks)   Cardiac Device Check - Remote (Standing ORD 5 count)    Collection Time: 03/17/25  9:51 AM   Result Value Ref Range    Date Time Interrogation Session 20,250,316,235,637     Implantable Pulse Generator  Medtronic     Implantable Pulse Generator Model [id number] [place] XT DR MRI     Implantable Pulse Generator Serial Number [id number]     Type Interrogation Session Remote Scheduled     Cl[facility]     Implantable Pulse Generator Type Pacemaker     Implantable Pulse Generator Implant Date 20,200,905     Implantable Lead  Medtronic     Implantable Lead Model 4574 Capsure Sense MRI SureScan     Implantable Lead Serial Number [id number]     Implantable Lead Implant Date 20,200,905     Implantable Lead Polarity Type Bipolar Lead     Implantable Lead Location Detail 1 UNKNOWN     Implantable Lead Special Function 45 CM     Implantable Lead Location Right Atrium     Implantable Lead Connection Status Connected     Implantable Lead  Medtronic     Implantable Lead Model 5076 CapSureFix Novus MRI SureScan     Implantable Lead Serial Number [id number]     Implantable Lead Implant Date 20200905     Implantable Lead Polarity Type Bipolar Lead     Implantable Lead Location Detail 1 UNKNOWN     Implantable Lead Special Function 58 CM     Implantable Lead Location Right Ventricle     Implantable Lead Connection Status Connected     Alon Setting Mode (NBG Code) DDD     Alon Setting Lower Rate Limit 60 [beats]/min    Alon Setting Maximum Tracking Rate 120 [beats]/min    Alon Setting Maximum Sensor Rate 120 [beats]/min    Alon Setting Hysterisis Rate DISABLED     Alon Setting JUWAN Delay Low 200 ms    Alon Setting PAV Delay Low 220 ms     Alon Setting AT Mode Switch Rate 171 [beats]/min    Lead Channel Setting Sensing Polarity Bipolar     Lead Channel Setting Sensing Anode Location Right Atrium     Lead Channel Setting Sensing Anode Terminal Ring     Lead Channel Setting Sensing Cathode Location Right Atrium     Lead Channel Setting Sensing Cathode Terminal Tip     Lead Channel Setting Sensing Sensitivity 0.3 mV    Lead Channel Setting Sensing Polarity Bipolar     Lead Channel Setting Sensing Anode Location Right Ventricle     Lead Channel Setting Sensing Anode Terminal Ring     Lead Channel Setting Sensing Cathode Location Right Ventricle     Lead Channel Setting Sensing Cathode Terminal Tip     Lead Channel Setting Sensing Sensitivity 0.9 mV    Lead Channel Setting Pacing Polarity Bipolar     Lead Channel Setting Pacing Anode Location Right Atrium     Lead Channel Setting Pacing Anode Terminal Ring     Lead Channel Setting Sensing Cathode Location Right Atrium     Lead Channel Setting Sensing Cathode Terminal Tip     Lead Channel Setting Pacing Pulse Width 0.4 ms    Lead Channel Setting Pacing Amplitude 1.5 V    Lead Channel Setting Pacing Capture Mode Adaptive     Lead Channel Setting Pacing Polarity Bipolar     Lead Channel Setting Pacing Anode Location Right Ventricle     Lead Channel Setting Pacing Anode Terminal Ring     Lead Channel Setting Sensing Cathode Location Right Ventricle     Lead Channel Setting Sensing Cathode Terminal Tip     Lead Channel Setting Pacing Pulse Width 0.4 ms    Lead Channel Setting Pacing Amplitude 1.75 V    Lead Channel Setting Pacing Capture Mode Adaptive     Zone Setting Type Category VF     Zone Setting Vendor Type Category V High Rate     Zone Setting Type Category VT     Zone Setting Vendor Type Category FastVT     Zone Setting Type Category VT     Zone Setting Vendor Type Category VT     Zone Setting Type Category VT     Zone Setting Vendor Type Category MonVT     Zone Setting Status Monitor     Zone Setting  Detection Interval 400 ms    Zone Setting Type Category ATRIAL_FIBRILLATION     Zone Setting Vendor Type Category FastATAF     Zone Setting Type Category AT/AF     Zone Setting Status Monitor     Zone Setting Detection Interval 350 ms    Lead Channel Impedance Value 475 ohm    Lead Channel Impedance Value 456 ohm    Lead Channel Sensing Intrinsic Amplitude 3.75 mV    Lead Channel Sensing Intrinsic Amplitude 3.75 mV    Lead Channel Pacing Threshold Amplitude 0.375 V    Lead Channel Pacing Threshold Pulse Width 0.4 ms    Lead Channel Impedance Value 418 ohm    Lead Channel Impedance Value 323 ohm    Lead Channel Pacing Threshold Amplitude 0.875 V    Lead Channel Pacing Threshold Pulse Width 0.4 ms    Battery Date Time of Measurements 20,250,316,211,502     Battery Status OK     Battery RRT Trigger 2.625     Battery Remaining Longevity 103 mo    Battery Voltage 2.99 V    Alon Statistic Date Time Start 20,241,205,181,756     Alon Statistic Date Time End 20,250,316,235,637     Alon Statistic RA Percent Paced 33.75 %    Alon Statistic RV Percent Paced 99.97 %    Alon Statistic AP  Percent 33.77 %    Alon Statistic AS  Percent 66.2 %    Alon Statistic AP VS Percent 0 %    Alon Statistic AS VS Percent 0.03 %    Atrial Tachy Statistic Date Time Start 20,241,205,181,756     Atrial Tachy Statistic Date Time End 20,250,316,235,637     Atrial Tachy Statistic AT/AF [place] Percent 0 %    Therapy Statistic Recent Date Time Start 20,241,205,181,756     Therapy Statistic Recent Date Time End 20,250,316,235,637     Therapy Statistic Total  Date Time Start 20,200,905,093,125     Therapy Statistic Total  Date Time End 20,250,316,235,637     Episode Statistic Recent Count 0     Episode Statistic Type Category AT/AF     Episode Statistic Recent Count 0     Episode Statistic Type Category Patient Activated     Episode Statistic Recent Count 0     Episode Statistic Type Category SVT     Episode Statistic Recent Count 0      Episode Statistic Type Category VT     Episode Statistic Recent Count 0     Episode Statistic Type Category VT     Episode Statistic Recent Date Time Start 20,241,205,181,756     Episode Statistic Recent Date Time End 20,250,316,235,637     Episode Statistic Recent Date Time Start [id number]     Episode Statistic Recent Date Time End 27883239578971     Episode Statistic Recent Date Time Start 60239060312279     Episode Statistic Recent Date Time End 66423187382346     Episode Statistic Recent Date Time Start 66292160717866     Episode Statistic Recent Date Time End 89576858631961     Episode Statistic Recent Date Time Start 90101157237652     Episode Statistic Recent Date Time End 51345055007922     Episode Statistic Total Count 265     Episode Statistic Type Category AT/AF     Episode Statistic Total Count 0     Episode Statistic Type Category Patient Activated     Episode Statistic Total Count 0     Episode Statistic Type Category SVT     Episode Statistic Total Count 3     Episode Statistic Type Category VT     Episode Statistic Total Count 0     Episode Statistic Type Category VT     Episode Statistic Total Date Time Start 20,200,905,093,125     Episode Statistic Total Date Time End 20,250,316,235,637     Episode Statistic Total Date Time Start 65681868375884     Episode Statistic Total Date Time End 75031016024905     Episode Statistic Total Date Time Start 90963475258974     Episode Statistic Total Date Time End 96711268913671     Episode Statistic Total Date Time Start 68279462178184     Episode Statistic Total Date Time End 56032636342252     Episode Statistic Total Date Time Start 45571480524181     Episode Statistic Total Date Time End 94823451519942    CBC with platelets    Collection Time: 04/09/25  1:55 PM   Result Value Ref Range    WBC Count 7.9 4.0 - 11.0 10e3/uL    RBC Count 4.15 3.80 - 5.20 10e6/uL    Hemoglobin 11.6 (L) 11.7 - 15.7 g/dL    Hematocrit 35.2 35.0 - 47.0 %    MCV 85 78 - 100 fL     MCH 28.0 26.5 - 33.0 pg    MCHC 33.0 31.5 - 36.5 g/dL    RDW 14.6 10.0 - 15.0 %    Platelet Count 258 150 - 450 10e3/uL   Comprehensive metabolic panel (BMP + Alb, Alk Phos, ALT, AST, Total. Bili, TP)    Collection Time: 04/09/25  1:55 PM   Result Value Ref Range    Sodium 140 135 - 145 mmol/L    Potassium 4.0 3.4 - 5.3 mmol/L    Carbon Dioxide (CO2) 27 22 - 29 mmol/L    Anion Gap 12 7 - 15 mmol/L    Urea Nitrogen 33.1 (H) 8.0 - 23.0 mg/dL    Creatinine 1.31 (H) 0.51 - 0.95 mg/dL    GFR Estimate 40 (L) >60 mL/min/1.73m2    Calcium 9.8 8.8 - 10.4 mg/dL    Chloride 101 98 - 107 mmol/L    Glucose 152 (H) 70 - 99 mg/dL    Alkaline Phosphatase 80 40 - 150 U/L    AST 50 (H) 0 - 45 U/L    ALT 78 (H) 0 - 50 U/L    Protein Total 7.9 6.4 - 8.3 g/dL    Albumin 4.3 3.5 - 5.2 g/dL    Bilirubin Total 0.4 <=1.2 mg/dL    Patient Fasting > 8hrs? No    Glucose    Collection Time: 04/09/25  1:55 PM   Result Value Ref Range    Glucose 152 (H) 70 - 99 mg/dL    Patient Fasting > 8hrs? No    UA Macroscopic with reflex to Microscopic and Culture - Lab Collect    Collection Time: 04/09/25  2:13 PM    Specimen: Urine, NOS   Result Value Ref Range    Color Urine Straw Colorless, Straw, Light Yellow, Yellow    Appearance Urine Clear Clear    Glucose Urine Negative Negative mg/dL    Bilirubin Urine Negative Negative    Ketones Urine Negative Negative mg/dL    Specific Gravity Urine 1.012 1.003 - 1.035    Blood Urine Negative Negative    pH Urine 5.5 5.0 - 7.0    Protein Albumin Urine Negative Negative mg/dL    Urobilinogen Urine Normal Normal mg/dL    Nitrite Urine Negative Negative    Leukocyte Esterase Urine Negative Negative     CMP  Recent Labs   Lab Test 04/09/25  1355 09/30/24  1239 08/19/24  1512 11/21/23  1605    141 138 137   POTASSIUM 4.0 4.5 4.2 4.0   CHLORIDE 101 103 101 102   CO2 27 27 27 24   ANIONGAP 12 11 10 11   *  152* 109* 125*  125* 194*   BUN 33.1* 32.1* 31.2* 27.9*   CR 1.31* 1.39* 1.26* 1.72*   GFRESTIMATED  40* 38* 42* 29*   YOGI 9.8 9.7 9.2 9.4   PHOS  --  3.7 2.5  --    PROTTOTAL 7.9  --  7.6  --    ALBUMIN 4.3 4.3 4.1  --    BILITOTAL 0.4  --  0.3  --    ALKPHOS 80  --  76  --    AST 50*  --  29  --    ALT 78*  --  24  --      CBC  Recent Labs   Lab Test 04/09/25  1355 09/30/24  1239 08/19/24  1512   HGB 11.6* 11.5* 11.4*   WBC 7.9  --  10.8   RBC 4.15  --  4.06   HCT 35.2  --  34.9*   MCV 85  --  86   MCH 28.0  --  28.1   MCHC 33.0  --  32.7   RDW 14.6  --  14.7     --  279     INRNo lab results found.  ABGNo lab results found.   URINE STUDIES  Recent Labs   Lab Test 04/09/25  1413 08/19/24  1520 07/28/22  1552   COLOR Straw Straw Yellow   APPEARANCE Clear Clear Clear   URINEGLC Negative Negative Negative   URINEBILI Negative Negative Negative   URINEKETONE Negative Negative Negative   SG 1.012 1.009 1.010   UBLD Negative Negative Negative   URINEPH 5.5 5.5 7.0   PROTEIN Negative Negative Negative   UROBILINOGEN  --   --  0.2   NITRITE Negative Negative Negative   LEUKEST Negative Negative Negative   RBCU  --  <1  --    WBCU  --  <1  --      No lab results found.    ASSESSMENT AND PLAN:   #CKD stage 3 with potentially contributing factors type 2 diabetes, hypertension, vascular disease, cardiorenal syndrome and decline related to age. Renal ultrasound shows small atrophic kidneys. BP and glycemia are currently well controlled. The patient was instructed to keep the sodium intake around 2300 mg /day, follow a plant-based diet and to avoid NSAIDs     #HTN  Primary and secondary to CKD. Pursue lisinopril-hydrochlorothiazide    #Type 2 DM   Hba1c in November 2023 off medications is 7.5 and 6.9 in August 2024. Pursue current management.    #CVD/dyslipidemia/PVD  On  rosuvastatin as indicated for any patient with CKD above the age of 50     #Blood count  Hemoglobin level is 11.5 -> 11.6 at target    #Acid-base status  CO2 level 24 -> 27 normal  No need for sodium bicarbonate supplementation    #Electrolytes  Na 137  -> 140  K 4   No acute issues    #Disturbed LFTs possibly secondary to rosuvastatin use, will recheck them in one week from now    #CKD journey/transplant not a candidate at this point due to low risk of progression    The total time of this encounter amounted to 30 minutes on the day of the encounter. This time included time spent with the patient, reviewing records, ordering tests, and performing post visit documentation.     The patient will return to follow up in 12 months    Nafisa Aparicio MD  Division of Renal Disease and Hypertension      Again, thank you for allowing me to participate in the care of your patient.      Sincerely,    Nafisa Aparicio MD

## 2025-04-09 NOTE — PROGRESS NOTES
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  From a renal standpoint, it is unclear when CKD started as her kidney function has been infrequently checked. Her creatinine level was  1.1 mg/dL in 2020, not measured in 2021 two measures are available in 2022 at 1.89 and 1.55 mg/dL. It was measured twice in 2023 and was 1.57 mg/dL in April 2023 and 1.72 mg/dL in November 2023,  1.26 mg/dL on 8/19/2024,  1.39 mg/dL on 9/30/2024 and 1.31 mg/dL on 4/9/2025. A urinalysis done in July 2022 shows no evidence of albuminuria, hematuria or leucocyturia. The uPCR is 0.15 mg/mg Cr in August 2024, 0.1 mg/mg Cr in September 2024 and negative for any proteinuria in April 2025. A renal ultrasound done in August 2024 shows bilateral atrophic kidneys. For hypertension, the patient is lisinopril-hydrochlorothiazide 10-12.5 mg daily. The blood pressure has been well controlled. The diabetes has been in general well controlled with diet only with an Hba1c at 7.5 in November 2023 and at 6.9 in August 2024.  The following portions of the patient's history were reviewed and updated as appropriate: allergies, current medications, past family history, past medical history, past social history, past surgical history and problem list.    ULTRASOUND RENAL COMPLETE WITH DOPPLER 8/27/2024 8:33 AM     CLINICAL HISTORY: Chronic renal disease.  Hypertension.     COMPARISONS: None available.     ORDERING  PROVIDER: ASIM BARRAZA     TECHNIQUE: B-mode (grayscale) and duplex Doppler evaluation of the  abdominal aorta and renal arteries performed. Velocity measurements  obtained with angle correction at or less than 60 degrees.     Findings:     AORTA:       At renals: 67/7 cm/s, arterial monophasic     RIGHT KIDNEY:       Renal artery:            Origin: 109/20 cm/s            Mid: 118/20 cm/s            Hilum: 89/17 cm/s          Renal artery - aortic ratio: 1.76          Renal vein: 21 cm/s          Size: 3.8 x 4.5 x 8.3 cm          Cortical thickness: 1.1 cm          Segmental artery resistive index (superior / mid / inferior):  0.87 / 0.88 / 0.87          Parenchyma: Normal. No stone, mass, or hydronephrosis.     LEFT KIDNEY:       Renal artery:            Origin: 46/5 cm/s, 46/5 cm/s            Mid: 60/11 cm/s            Hilum: 55/9 cm/s          Renal artery - aortic ratio: 0.90          Renal vein: 21 cm/s          Size:3.5 x 3.7 x 8.9 cm          Cortical thickness: 0.9 cm          Segmental artery resistive index (superior / mid / inferior):  0.85 / 0.87 / 0.91          Parenchyma: Normal. No stone, mass, or hydronephrosis.     Bladder: Distended.                                                                      IMPRESSION:   1. No renal artery stenosis demonstrated.     2. Elevated segmental artery resistive indices suggest medical renal  disease.     3. Negative grayscale appearance of bilateral kidneys..     Guidelines:  Diagnostic criteria suggestive of > 60% diameter stenosis  Renal artery:       Peak systolic velocity > 180 cm/s       RAR > 3.5       Turbulent flow     Arcuate/Segmental artery Resistive Index Normal < 0.7     ARELI LAWSON MD        PAST MEDICAL HISTORY:  Past Medical History:   Diagnosis Date    Cardiac pacemaker in situ 10/07/2020    Type 2 diabetes mellitus without complication, without long-term current use of insulin (H) 10/07/2020     PAST SURGICAL HISTORY:  Past Surgical History:    Procedure Laterality Date    IMPLANT PACEMAKER       MEDICATIONS:  Prescription Medications as of 4/9/2025         Rx Number Disp Refills Start End Last Dispensed Date Next Fill Date Owning Pharmacy    Continuous Glucose  (FREESTYLE ANURAG 14 DAY READER) STEVIE  1 each 0 8/27/2024 --   [facility] [address]    Sig: Use to read blood sugars as per 's instructions.    Class: E-Prescribe    Continuous Glucose Sensor (FREESTYLE ANURAG 14 DAY SENSOR) MISC  6 each 3 8/27/2024 --   [facility] - [address]    Sig: Change every 14 days.    Class: E-Prescribe    lisinopril-hydrochlorothiazide (ZESTORETIC) 10-12.5 MG tablet  90 tablet 3 8/27/2024 --   [facility] [address] [place] [address]    Sig: Take 1 tablet by mouth daily.    Class: E-Prescribe    Route: Oral    Multiple Vitamins-Minerals (MULTIVITAMIN ADULT PO)  -- --  --   [facility] #[id number] - [address]    Class: Historical    Route: Oral    rosuvastatin (CRESTOR) 10 MG tablet  90 tablet 3 8/27/2024 --   [facility] [address] [place] [address]    Sig: Take 1 tablet (10 mg) by mouth daily.    Class: E-Prescribe    Route: Oral           ALLERGIES:    No Known Allergies  REVIEW OF SYSTEMS:    A comprehensive review of systems was performed and found to be negative except as described here or above.  SOCIAL HISTORY:   Social History     Socioeconomic History    Marital status:      Spouse name: Not on file    Number of children: Not on file    Years of education: Not on file    Highest education level: Not on file   Occupational History    Not on file   Tobacco Use    Smoking status: Never     Passive exposure: Never (per pt)    Smokeless tobacco: Never   Vaping Use    Vaping status: Never Used   Substance and Sexual Activity    Alcohol use: Never    Drug use: Never     Sexual activity: Not on file   Other Topics Concern    Not on file   Social History Narrative    Not on file     Social Drivers of Health     Financial Resource Strain: Low Risk  (5/14/2024)    Financial Resource Strain     Within the past 12 months, have you or your family members you live with been unable to get utilities (heat, electricity) when it was really needed?: No   Food Insecurity: Low Risk  (5/14/2024)    Food Insecurity     Within the past 12 months, did you worry that your food would run out before you got money to buy more?: No     Within the past 12 months, did the food you bought just not last and you didn t have money to get more?: No   Transportation Needs: Low Risk  (5/14/2024)    Transportation Needs     Within the past 12 months, has lack of transportation kept you from medical appointments, getting your medicines, non-medical meetings or appointments, work, or from getting things that you need?: No   Physical Activity: Not on file   Stress: Not on file   Social Connections: Not on file   Interpersonal Safety: Not on file   Housing Stability: Low Risk  (5/14/2024)    Housing Stability     Do you have housing? : Yes     Are you worried about losing your housing?: No     FAMILY MEDICAL HISTORY:   No family history on file.  PHYSICAL EXAM:   There were no vitals taken for this visit.  GENERAL APPEARANCE: alert and no distress  EYES: nonicteric  HENT: mouth without ulcers or lesions  NECK: supple, no adenopathy  RESP: lungs clear to auscultation   CV: regular rhythm, normal rate, no rub  ABDOMEN: soft, nontender, normal bowel sounds, no HSM   Extremities: no clubbing, cyanosis, or edema  MS: no evidence of inflammation in joints, no muscle tenderness  SKIN: no rash  NEURO: mentation intact and speech normal  PSYCH: affect normal/bright   LABS:   Recent Results (from the past 4 weeks)   Cardiac Device Check - Remote (Standing ORD 5 count)    Collection Time: 03/17/25  9:51 AM   Result Value Ref  Range    Date Time Interrogation Session 20,250,316,235,637     Implantable Pulse Generator  Medtronic     Implantable Pulse Generator Model [id number] Lander XT DR MRI     Implantable Pulse Generator Serial Number [id number]     Type Interrogation Session Remote Scheduled     Clinic Name [facility]     Implantable Pulse Generator Type Pacemaker     Implantable Pulse Generator Implant Date 20,200,905     Implantable Lead  Medtronic     Implantable Lead Model 4574 Capsure Sense MRI SureScan     Implantable Lead Serial Number [id number]     Implantable Lead Implant Date 20,200,905     Implantable Lead Polarity Type Bipolar Lead     Implantable Lead Location Detail 1 UNKNOWN     Implantable Lead Special Function 45 CM     Implantable Lead Location Right Atrium     Implantable Lead Connection Status Connected     Implantable Lead  Medtronic     Implantable Lead Model 5076 CapSureFix Novus MRI SureScan     Implantable Lead Serial Number [id number]     Implantable Lead Implant Date 20200905     Implantable Lead Polarity Type Bipolar Lead     Implantable Lead Location Detail 1 UNKNOWN     Implantable Lead Special Function 58 CM     Implantable Lead Location Right Ventricle     Implantable Lead Connection Status Connected     Alon Setting Mode (NBG Code) DDD     Alon Setting Lower Rate Limit 60 [beats]/min    Alon Setting Maximum Tracking Rate 120 [beats]/min    Alon Setting Maximum Sensor Rate 120 [beats]/min    Alon Setting Hysterisis Rate DISABLED     Alon Setting JUWAN Delay Low 200 ms    Alon Setting PAV Delay Low 220 ms    Alon Setting AT Mode Switch Rate 171 [beats]/min    Lead Channel Setting Sensing Polarity Bipolar     Lead Channel Setting Sensing Anode Location Right Atrium     Lead Channel Setting Sensing Anode Terminal Ring     Lead Channel Setting Sensing Cathode Location Right Atrium     Lead Channel Setting Sensing Cathode Terminal Tip     Lead  Channel Setting Sensing Sensitivity 0.3 mV    Lead Channel Setting Sensing Polarity Bipolar     Lead Channel Setting Sensing Anode Location Right Ventricle     Lead Channel Setting Sensing Anode Terminal Ring     Lead Channel Setting Sensing Cathode Location Right Ventricle     Lead Channel Setting Sensing Cathode Terminal Tip     Lead Channel Setting Sensing Sensitivity 0.9 mV    Lead Channel Setting Pacing Polarity Bipolar     Lead Channel Setting Pacing Anode Location Right Atrium     Lead Channel Setting Pacing Anode Terminal Ring     Lead Channel Setting Sensing Cathode Location Right Atrium     Lead Channel Setting Sensing Cathode Terminal Tip     Lead Channel Setting Pacing Pulse Width 0.4 ms    Lead Channel Setting Pacing Amplitude 1.5 V    Lead Channel Setting Pacing Capture Mode Adaptive     Lead Channel Setting Pacing Polarity Bipolar     Lead Channel Setting Pacing Anode Location Right Ventricle     Lead Channel Setting Pacing Anode Terminal Ring     Lead Channel Setting Sensing Cathode Location Right Ventricle     Lead Channel Setting Sensing Cathode Terminal Tip     Lead Channel Setting Pacing Pulse Width 0.4 ms    Lead Channel Setting Pacing Amplitude 1.75 V    Lead Channel Setting Pacing Capture Mode Adaptive     Zone Setting Type Category VF     Zone Setting Vendor Type Category V High Rate     Zone Setting Type Category VT     Zone Setting Vendor Type Category FastVT     Zone Setting Type Category VT     Zone Setting Vendor Type Category VT     Zone Setting Type Category VT     Zone Setting Vendor Type Category MonVT     Zone Setting Status Monitor     Zone Setting Detection Interval 400 ms    Zone Setting Type Category ATRIAL_FIBRILLATION     Zone Setting Vendor Type Category FastATAF     Zone Setting Type Category AT/AF     Zone Setting Status Monitor     Zone Setting Detection Interval 350 ms    Lead Channel Impedance Value 475 ohm    Lead Channel Impedance Value 456 ohm    Lead Channel Sensing  Intrinsic Amplitude 3.75 mV    Lead Channel Sensing Intrinsic Amplitude 3.75 mV    Lead Channel Pacing Threshold Amplitude 0.375 V    Lead Channel Pacing Threshold Pulse Width 0.4 ms    Lead Channel Impedance Value 418 ohm    Lead Channel Impedance Value 323 ohm    Lead Channel Pacing Threshold Amplitude 0.875 V    Lead Channel Pacing Threshold Pulse Width 0.4 ms    Battery Date Time of Measurements 20,250,316,211,502     Battery Status OK     Battery RRT Trigger 2.625     Battery Remaining Longevity 103 mo    Battery Voltage 2.99 V    Alon Statistic Date Time Start 20,241,205,181,756     Alon Statistic Date Time End 20,250,316,235,637     Alon Statistic RA Percent Paced 33.75 %    Alon Statistic RV Percent Paced 99.97 %    Alon Statistic AP  Percent 33.77 %    Alon Statistic AS  Percent 66.2 %    Alon Statistic AP VS Percent 0 %    Alon Statistic AS VS Percent 0.03 %    Atrial Tachy Statistic Date Time Start 20,241,205,181,756     Atrial Tachy Statistic Date Time End 20,250,316,235,637     Atrial Tachy Statistic AT/AF [place] Percent 0 %    Therapy Statistic Recent Date Time Start 20,241,205,181,756     Therapy Statistic Recent Date Time End 20,250,316,235,637     Therapy Statistic Total  Date Time Start 20,200,905,093,125     Therapy Statistic Total  Date Time End 20,250,316,235,637     Episode Statistic Recent Count 0     Episode Statistic Type Category AT/AF     Episode Statistic Recent Count 0     Episode Statistic Type Category Patient Activated     Episode Statistic Recent Count 0     Episode Statistic Type Category SVT     Episode Statistic Recent Count 0     Episode Statistic Type Category VT     Episode Statistic Recent Count 0     Episode Statistic Type Category VT     Episode Statistic Recent Date Time Start 20,241,205,181,756     Episode Statistic Recent Date Time End 20,250,316,235,637     Episode Statistic Recent Date Time Start [id number]     Episode Statistic Recent Date Time End  [id number]     Episode Statistic Recent Date Time Start 21086220098863     Episode Statistic Recent Date Time End 76014495544491     Episode Statistic Recent Date Time Start 45085261628372     Episode Statistic Recent Date Time End 01366946640887     Episode Statistic Recent Date Time Start 34228750955203     Episode Statistic Recent Date Time End 80273834922854     Episode Statistic Total Count 265     Episode Statistic Type Category AT/AF     Episode Statistic Total Count 0     Episode Statistic Type Category Patient Activated     Episode Statistic Total Count 0     Episode Statistic Type Category SVT     Episode Statistic Total Count 3     Episode Statistic Type Category VT     Episode Statistic Total Count 0     Episode Statistic Type Category VT     Episode Statistic Total Date Time Start 20,200,905,093,125     Episode Statistic Total Date Time End 20,250,316,235,637     Episode Statistic Total Date Time Start 29802148882608     Episode Statistic Total Date Time End 57490457308699     Episode Statistic Total Date Time Start 19179940341321     Episode Statistic Total Date Time End 03295889244144     Episode Statistic Total Date Time Start 24100704771559     Episode Statistic Total Date Time End 60532377445263     Episode Statistic Total Date Time Start 25336191868304     Episode Statistic Total Date Time End 18497552189368    CBC with platelets    Collection Time: 04/09/25  1:55 PM   Result Value Ref Range    WBC Count 7.9 4.0 - 11.0 10e3/uL    RBC Count 4.15 3.80 - 5.20 10e6/uL    Hemoglobin 11.6 (L) 11.7 - 15.7 g/dL    Hematocrit 35.2 35.0 - 47.0 %    MCV 85 78 - 100 fL    MCH 28.0 26.5 - 33.0 pg    MCHC 33.0 31.5 - 36.5 g/dL    RDW 14.6 10.0 - 15.0 %    Platelet Count 258 150 - 450 10e3/uL   Comprehensive metabolic panel (BMP + Alb, Alk Phos, ALT, AST, Total. Bili, TP)    Collection Time: 04/09/25  1:55 PM   Result Value Ref Range    Sodium 140 135 - 145 mmol/L    Potassium 4.0 3.4 - 5.3 mmol/L    Carbon  Dioxide (CO2) 27 22 - 29 mmol/L    Anion Gap 12 7 - 15 mmol/L    Urea Nitrogen 33.1 (H) 8.0 - 23.0 mg/dL    Creatinine 1.31 (H) 0.51 - 0.95 mg/dL    GFR Estimate 40 (L) >60 mL/min/1.73m2    Calcium 9.8 8.8 - 10.4 mg/dL    Chloride 101 98 - 107 mmol/L    Glucose 152 (H) 70 - 99 mg/dL    Alkaline Phosphatase 80 40 - 150 U/L    AST 50 (H) 0 - 45 U/L    ALT 78 (H) 0 - 50 U/L    Protein Total 7.9 6.4 - 8.3 g/dL    Albumin 4.3 3.5 - 5.2 g/dL    Bilirubin Total 0.4 <=1.2 mg/dL    Patient Fasting > 8hrs? No    Glucose    Collection Time: 04/09/25  1:55 PM   Result Value Ref Range    Glucose 152 (H) 70 - 99 mg/dL    Patient Fasting > 8hrs? No    UA Macroscopic with reflex to Microscopic and Culture - Lab Collect    Collection Time: 04/09/25  2:13 PM    Specimen: Urine, NOS   Result Value Ref Range    Color Urine Straw Colorless, Straw, Light Yellow, Yellow    Appearance Urine Clear Clear    Glucose Urine Negative Negative mg/dL    Bilirubin Urine Negative Negative    Ketones Urine Negative Negative mg/dL    Specific Gravity Urine 1.012 1.003 - 1.035    Blood Urine Negative Negative    pH Urine 5.5 5.0 - 7.0    Protein Albumin Urine Negative Negative mg/dL    Urobilinogen Urine Normal Normal mg/dL    Nitrite Urine Negative Negative    Leukocyte Esterase Urine Negative Negative     CMP  Recent Labs   Lab Test 04/09/25  1355 09/30/24  1239 08/19/24  1512 11/21/23  1605    141 138 137   POTASSIUM 4.0 4.5 4.2 4.0   CHLORIDE 101 103 101 102   CO2 27 27 27 24   ANIONGAP 12 11 10 11   *  152* 109* 125*  125* 194*   BUN 33.1* 32.1* 31.2* 27.9*   CR 1.31* 1.39* 1.26* 1.72*   GFRESTIMATED 40* 38* 42* 29*   YOGI 9.8 9.7 9.2 9.4   PHOS  --  3.7 2.5  --    PROTTOTAL 7.9  --  7.6  --    ALBUMIN 4.3 4.3 4.1  --    BILITOTAL 0.4  --  0.3  --    ALKPHOS 80  --  76  --    AST 50*  --  29  --    ALT 78*  --  24  --      CBC  Recent Labs   Lab Test 04/09/25  1355 09/30/24  1239 08/19/24  1512   HGB 11.6* 11.5* 11.4*   WBC 7.9  --   10.8   RBC 4.15  --  4.06   HCT 35.2  --  34.9*   MCV 85  --  86   MCH 28.0  --  28.1   MCHC 33.0  --  32.7   RDW 14.6  --  14.7     --  279     INRNo lab results found.  ABGNo lab results found.   URINE STUDIES  Recent Labs   Lab Test 04/09/25  1413 08/19/24  1520 07/28/22  1552   COLOR Straw Straw Yellow   APPEARANCE Clear Clear Clear   URINEGLC Negative Negative Negative   URINEBILI Negative Negative Negative   URINEKETONE Negative Negative Negative   SG 1.012 1.009 1.010   UBLD Negative Negative Negative   URINEPH 5.5 5.5 7.0   PROTEIN Negative Negative Negative   UROBILINOGEN  --   --  0.2   NITRITE Negative Negative Negative   LEUKEST Negative Negative Negative   RBCU  --  <1  --    WBCU  --  <1  --      No lab results found.    ASSESSMENT AND PLAN:   #CKD stage 3 with potentially contributing factors type 2 diabetes, hypertension, vascular disease, cardiorenal syndrome and decline related to age. Renal ultrasound shows small atrophic kidneys. BP and glycemia are currently well controlled. The patient was instructed to keep the sodium intake around 2300 mg /day, follow a plant-based diet and to avoid NSAIDs     #HTN  Primary and secondary to CKD. Pursue lisinopril-hydrochlorothiazide    #Type 2 DM   Hba1c in November 2023 off medications is 7.5 and 6.9 in August 2024. Pursue current management.    #CVD/dyslipidemia/PVD  On  rosuvastatin as indicated for any patient with CKD above the age of 50     #Blood count  Hemoglobin level is 11.5 -> 11.6 at target    #Acid-base status  CO2 level 24 -> 27 normal  No need for sodium bicarbonate supplementation    #Electrolytes  Na 137 -> 140  K 4   No acute issues    #Disturbed LFTs possibly secondary to rosuvastatin use, will recheck them in one week from now    #CKD journey/transplant not a candidate at this point due to low risk of progression    The total time of this encounter amounted to 30 minutes on the day of the encounter. This time included time spent  with the patient, reviewing records, ordering tests, and performing post visit documentation.     The patient will return to follow up in 12 months    Nafisa Aparicio MD  Division of Renal Disease and Hypertension

## 2025-04-09 NOTE — NURSING NOTE
Chief Complaint   Patient presents with    Follow Up     6 month follow up     BP Readings from Last 3 Encounters:   04/09/25 (!) 141/73   09/30/24 (!) 170/75   08/27/24 (!) 170/75       BP (!) 141/73   Pulse 68   Temp 98.2  F (36.8  C) (Oral)   Wt 55.9 kg (123 lb 3.2 oz)   SpO2 100%   BMI 23.28 kg/m       Minnie العلي

## 2025-04-23 ENCOUNTER — OFFICE VISIT (OUTPATIENT)
Dept: FAMILY MEDICINE | Facility: CLINIC | Age: 85
End: 2025-04-23
Payer: MEDICARE

## 2025-04-23 VITALS
RESPIRATION RATE: 17 BRPM | OXYGEN SATURATION: 100 % | HEIGHT: 60 IN | HEART RATE: 75 BPM | WEIGHT: 122.5 LBS | TEMPERATURE: 98.5 F | DIASTOLIC BLOOD PRESSURE: 71 MMHG | BODY MASS INDEX: 24.05 KG/M2 | SYSTOLIC BLOOD PRESSURE: 174 MMHG

## 2025-04-23 DIAGNOSIS — N18.32 CHRONIC KIDNEY DISEASE, STAGE 3B (H): ICD-10-CM

## 2025-04-23 DIAGNOSIS — Z23 NEED FOR COVID-19 VACCINE: ICD-10-CM

## 2025-04-23 DIAGNOSIS — Z00.00 WELLNESS EXAMINATION: Primary | ICD-10-CM

## 2025-04-23 DIAGNOSIS — I10 ESSENTIAL HYPERTENSION: ICD-10-CM

## 2025-04-23 DIAGNOSIS — Z23 NEED FOR VACCINATION: ICD-10-CM

## 2025-04-23 DIAGNOSIS — I48.0 PAROXYSMAL ATRIAL FIBRILLATION (H): ICD-10-CM

## 2025-04-23 DIAGNOSIS — G47.00 INSOMNIA, UNSPECIFIED TYPE: ICD-10-CM

## 2025-04-23 DIAGNOSIS — R35.0 FREQUENT URINATION: ICD-10-CM

## 2025-04-23 DIAGNOSIS — E11.59 TYPE 2 DIABETES MELLITUS WITH OTHER CIRCULATORY COMPLICATION, UNSPECIFIED WHETHER LONG TERM INSULIN USE (H): ICD-10-CM

## 2025-04-23 LAB
ALBUMIN UR-MCNC: NEGATIVE MG/DL
APPEARANCE UR: CLEAR
BILIRUB UR QL STRIP: NEGATIVE
COLOR UR AUTO: YELLOW
GLUCOSE UR STRIP-MCNC: NEGATIVE MG/DL
HGB UR QL STRIP: NEGATIVE
KETONES UR STRIP-MCNC: NEGATIVE MG/DL
LEUKOCYTE ESTERASE UR QL STRIP: ABNORMAL
NITRATE UR QL: NEGATIVE
PH UR STRIP: 6 [PH] (ref 5–7)
SP GR UR STRIP: 1.01 (ref 1–1.03)
UROBILINOGEN UR STRIP-ACNC: 0.2 E.U./DL

## 2025-04-23 PROCEDURE — 87086 URINE CULTURE/COLONY COUNT: CPT | Mod: ORL | Performed by: FAMILY MEDICINE

## 2025-04-23 RX ORDER — LISINOPRIL AND HYDROCHLOROTHIAZIDE 20; 25 MG/1; MG/1
1 TABLET ORAL DAILY
Qty: 90 TABLET | Refills: 3 | Status: SHIPPED | OUTPATIENT
Start: 2025-04-23

## 2025-04-23 RX ORDER — VALERIAN ROOT 250 MG
1 CAPSULE ORAL AT BEDTIME
Qty: 90 CAPSULE | Refills: 1 | Status: SHIPPED | OUTPATIENT
Start: 2025-04-23

## 2025-04-23 RX ORDER — ROSUVASTATIN CALCIUM 10 MG/1
10 TABLET, COATED ORAL DAILY
Qty: 90 TABLET | Refills: 3 | Status: SHIPPED | OUTPATIENT
Start: 2025-04-23

## 2025-04-23 RX ORDER — FLASH GLUCOSE SCANNING READER
EACH MISCELLANEOUS
Qty: 1 EACH | Refills: 0 | Status: SHIPPED | OUTPATIENT
Start: 2025-04-23

## 2025-04-23 RX ORDER — LISINOPRIL AND HYDROCHLOROTHIAZIDE 10; 12.5 MG/1; MG/1
1 TABLET ORAL DAILY
Qty: 90 TABLET | Refills: 3 | Status: SHIPPED | OUTPATIENT
Start: 2025-04-23 | End: 2025-04-23

## 2025-04-23 RX ORDER — FLASH GLUCOSE SENSOR
KIT MISCELLANEOUS
Qty: 6 EACH | Refills: 3 | Status: SHIPPED | OUTPATIENT
Start: 2025-04-23

## 2025-04-23 SDOH — HEALTH STABILITY: PHYSICAL HEALTH: ON AVERAGE, HOW MANY DAYS PER WEEK DO YOU ENGAGE IN MODERATE TO STRENUOUS EXERCISE (LIKE A BRISK WALK)?: 0 DAYS

## 2025-04-23 SDOH — HEALTH STABILITY: PHYSICAL HEALTH: ON AVERAGE, HOW MANY MINUTES DO YOU ENGAGE IN EXERCISE AT THIS LEVEL?: 10 MIN

## 2025-04-23 ASSESSMENT — SOCIAL DETERMINANTS OF HEALTH (SDOH): HOW OFTEN DO YOU GET TOGETHER WITH FRIENDS OR RELATIVES?: MORE THAN THREE TIMES A WEEK

## 2025-04-23 NOTE — PROGRESS NOTES
Preventive Care Visit  [facility]  Nicholas Velazquez MD, Family Medicine  Apr 23, 2025      Assessment & Plan   Problem List Items Addressed This Visit          Endocrine    Type 2 diabetes mellitus with other circulatory complication, unspecified whether long term insulin use (H)    Relevant Medications    rosuvastatin (CRESTOR) 10 MG tablet    Continuous Glucose Sensor (FREESTYLE ANURAG 14 DAY SENSOR) MISC    Continuous Glucose  (FREESTYLE ANURAG 14 DAY READER) STEVIE       Circulatory    Essential hypertension    Relevant Medications    lisinopril-hydrochlorothiazide (ZESTORETIC) 20-25 MG tablet     Other Visit Diagnoses       Wellness examination    -  Primary    Insomnia, unspecified type        Relevant Medications    Valerian Root 250 MG CAPS    Need for vaccination        Relevant Orders    Pneumococcal 20 Valent Conjugate (PCV20) (Completed)    Frequent urination        Relevant Orders    Urinalysis Macroscopic (Completed)    Urine Culture Aerobic Bacterial    Need for COVID-19 vaccine        Relevant Orders    COVID-19 12+ (MODERNA) (Completed)           Medication and diabetes monitoring supplies refilled as noted.    Concern for elevated blood pressures as noted. Increasing dose of medication as noted above. Will monitor for safety and efficacy.     Recent labs reviewed.     Some concern for increased urination. Agreed to U/A which showed LE. Cultures ordered and pending.     Concern for difficulty with sleep. Per son, Ashley wakes up about every two hours to go to the bathroom. Most recent A1c reassuring. Son is worried because Ashley goes downstairs to use the bathroom and he's afraid she could fall. Agreed to a trial of valerian root as I would like to avoid overly strong sleep aid medication.     Patient has been advised of split billing requirements and indicates understanding: Yes       Counseling  Appropriate preventive services were addressed with this patient via screening, questionnaire, or  discussion as appropriate for fall prevention, nutrition, physical activity, Tobacco-use cessation, social engagement, weight loss and cognition.  Checklist reviewing preventive services available has been given to the patient.  Reviewed patient's diet, addressing concerns and/or questions.   She is at risk for psychosocial distress and has been provided with information to reduce risk.   Updated plan of care.  Patient reported difficulty with activities of daily living were addressed today.Patient reported safety concerns were addressed today.Addressed any concerns about safety while driving.  The patient was provided with written information regarding signs of hearing loss.   Information on urinary incontinence and treatment options given to patient.     Nicholas Velazquez MD  5:19 PM, April 23, 2025      Agueda Ramirez is a 84 year old, presenting for the following:  Physical    HPI  # Health Maintenance  - BP:   BP Readings from Last 3 Encounters:   04/23/25 (!) 174/71   04/09/25 (!) 141/73   09/30/24 (!) 170/75   - Cholesterol: previous labs reviewed  Recent Labs   Lab Test 01/27/22  1436   CHOL 174   HDL 56   LDL 74   TRIG 222*   The ASCVD Risk score (Liz DK, et al., 2019) failed to calculate for the following reasons:    The 2019 ASCVD risk score is only valid for ages 40 to 79  - Lung Cancer Screening: not indicated  55-81yo w/30py smoking history and currently smoking OR quit within past 15 years:  Low dose CT annually and discontinued once a person has been 15 years tobacco free  - (+) seatbelt use, (+) helmet, (+) smoke detector  - Feels safe at home, denies verbal/physical/emotional abuse in past year: yes     Advance Care Planning  No ACP on file.        4/23/2025   General Health   How would you rate your overall physical health? (!) DECLINE   Feel stress (tense, anxious, or unable to sleep) Very much   (!) STRESS CONCERN      4/23/2025   Nutrition   Diet: Regular (no restrictions)         4/23/2025    Exercise   Days per week of moderate/strenous exercise 0 days   Average minutes spent exercising at this level 10 min   (!) EXERCISE CONCERN      4/23/2025   Social Factors   Frequency of gathering with friends or relatives More than three times a week   Worry food won't last until get money to buy more No   Food not last or not have enough money for food? No   Do you have housing? (Housing is defined as stable permanent housing and does not include staying outside in a car, in a tent, in an abandoned building, in an overnight shelter, or couch-surfing.) Yes   Are you worried about losing your housing? No   Lack of transportation? Yes   Unable to get utilities (heat,electricity)? No    (!) TRANSPORTATION CONCERN PRESENT      4/23/2025   Fall Risk   Fallen 2 or more times in the past year? No   Trouble with walking or balance? Yes         4/23/2025   Activities of Daily Living- Home Safety   Needs help with the following daily activites Bathing    Dressing   Safety concerns in the home No grab bars in the bathroom       Multiple values from one day are sorted in reverse-chronological order         4/23/2025   Dental   Dentist two times every year? Yes         4/23/2025   Hearing Screening   Hearing concerns? (!) I FEEL THAT PEOPLE ARE MUMBLING OR NOT SPEAKING CLEARLY.         4/23/2025   Driving Risk Screening   Patient/family members have concerns about driving (!) YES          4/23/2025   General Alertness/Fatigue Screening   Have you been more tired than usual lately? No         4/23/2025   Urinary Incontinence Screening   Bothered by leaking urine in past 6 months Yes         Today's PHQ-2 Score:       4/23/2025    10:35 AM   PHQ-2 ( 1999 Pfizer)   PHQ-2 Score Incomplete           4/23/2025   Substance Use   Alcohol more than 3/day or more than 7/wk No   Do you have a current opioid prescription? No   How severe/bad is pain from 1 to 10? 0/10 (No Pain)   Do you use any other substances recreationally? No      Social History     Tobacco Use    Smoking status: Never     Passive exposure: Never (per pt)    Smokeless tobacco: Never   Vaping Use    Vaping status: Never Used   Substance Use Topics    Alcohol use: Never    Drug use: Never       Past Medical History:   Diagnosis Date    Cardiac pacemaker in situ 10/07/2020    Type 2 diabetes mellitus without complication, without long-term current use of insulin (H) 10/07/2020     Past Surgical History:   Procedure Laterality Date    IMPLANT PACEMAKER       Current providers sharing in care for this patient include:  Patient Care Team:  Nicholas Velazquez MD as PCP - General (Family Practice)  Marisela Campoverde RD as Diabetes Educator (Dietitian, Registered)  Nicholas Velazquez MD as Assigned PCP  Eileen Hilton MD as Cardiologist (Cardiovascular Disease)  Emily Daniel MD as MD (Cardiovascular Disease)  Noemi Morrow MD as MD (Dermatology)  Gunnar Roe DPM as Assigned Surgical Provider  Karlo Moreno MD as Assigned Heart and Vascular Provider  Nafisa Aparicio MD as MD (Nephrology)  Nafisa Aparicio MD as Assigned Nephrology Provider  Semaj Ulloa MD as Assigned Heart and Vascular Surgical Provider    The following health maintenance items are reviewed in Epic and correct as of today:  Health Maintenance   Topic Date Due    ADVANCE CARE PLANNING  Never done    DTAP/TDAP/TD IMMUNIZATION (1 - Tdap) Never done    ZOSTER IMMUNIZATION (1 of 2) Never done    MEDICARE ANNUAL WELLNESS VISIT  Never done    RSV VACCINE (1 - 1-dose 75+ series) Never done    Pneumococcal Vaccine: 50+ Years (2 of 2 - PCV) 10/07/2021    LIPID  01/27/2023    DIABETIC FOOT EXAM  07/28/2023    MICROALBUMIN  04/28/2024    PHQ-2 (once per calendar year)  01/01/2025    COVID-19 Vaccine (7 - 2024-25 season) 03/15/2025    A1C  07/09/2025    EYE EXAM  10/23/2025    BMP  04/09/2026    FALL RISK ASSESSMENT  04/23/2026    DEXA  11/04/2035    INFLUENZA VACCINE  Completed    HPV IMMUNIZATION  Aged Out  "   MENINGITIS IMMUNIZATION  Aged Out     History reviewed. No pertinent family history.      Review of Systems  Constitutional, HEENT, cardiovascular, pulmonary, gi and gu systems are negative, except as otherwise noted.     Objective    Exam  BP (!) 174/71 (BP Location: Left arm, Patient Position: Sitting, Cuff Size: Adult Regular)   Pulse 75   Temp 98.5  F (36.9  C) (Temporal)   Resp 17   Ht 1.53 m (5' 0.25\")   Wt 55.6 kg (122 lb 8 oz)   SpO2 100%   BMI 23.73 kg/m     Estimated body mass index is 23.28 kg/m  as calculated from the following:    Height as of 2/22/24: 1.549 m (5' 1\").    Weight as of 4/9/25: 55.9 kg (123 lb 3.2 oz).    Physical Exam  GENERAL: alert and no distress  NECK: no adenopathy, no asymmetry, masses, or scars  RESP: lungs clear to auscultation - no rales, rhonchi or wheezes  CV: regular rate and rhythm, normal S1 S2, no S3 or S4, no murmur, click or rub, no peripheral edema  ABDOMEN: soft, nontender, no hepatosplenomegaly, no masses and bowel sounds normal  MS: no gross musculoskeletal defects noted, no edema        4/23/2025   Mini Cog   Mini-Cog Not Completed (choose reason) Patient declines            Signed Electronically by: Nicholas Velazquez MD    "

## 2025-04-24 LAB — BACTERIA UR CULT: NORMAL

## 2025-04-28 ENCOUNTER — TELEPHONE (OUTPATIENT)
Dept: FAMILY MEDICINE | Facility: CLINIC | Age: 85
End: 2025-04-28

## 2025-04-28 DIAGNOSIS — E11.59 TYPE 2 DIABETES MELLITUS WITH OTHER CIRCULATORY COMPLICATION, UNSPECIFIED WHETHER LONG TERM INSULIN USE (H): ICD-10-CM

## 2025-04-28 RX ORDER — FLASH GLUCOSE SCANNING READER
EACH MISCELLANEOUS
Qty: 1 EACH | Refills: 0 | Status: SHIPPED | OUTPATIENT
Start: 2025-04-28

## 2025-04-28 RX ORDER — FLASH GLUCOSE SENSOR
KIT MISCELLANEOUS
Qty: 6 EACH | Refills: 3 | Status: SHIPPED | OUTPATIENT
Start: 2025-04-28

## 2025-05-20 ENCOUNTER — TELEPHONE (OUTPATIENT)
Dept: FAMILY MEDICINE | Facility: CLINIC | Age: 85
End: 2025-05-20

## 2025-05-20 DIAGNOSIS — E11.59 TYPE 2 DIABETES MELLITUS WITH OTHER CIRCULATORY COMPLICATION, UNSPECIFIED WHETHER LONG TERM INSULIN USE (H): ICD-10-CM

## 2025-05-20 RX ORDER — FLASH GLUCOSE SENSOR
KIT MISCELLANEOUS
Qty: 6 EACH | Refills: 0 | Status: SHIPPED | OUTPATIENT
Start: 2025-05-20 | End: 2025-05-20

## 2025-05-20 RX ORDER — FLASH GLUCOSE SCANNING READER
EACH MISCELLANEOUS
Qty: 1 EACH | Refills: 0 | Status: CANCELLED | OUTPATIENT
Start: 2025-05-20

## 2025-05-20 RX ORDER — FLASH GLUCOSE SCANNING READER
EACH MISCELLANEOUS
Qty: 1 EACH | Refills: 0 | Status: SHIPPED | OUTPATIENT
Start: 2025-05-20 | End: 2025-05-20

## 2025-05-20 RX ORDER — HYDROCHLOROTHIAZIDE 12.5 MG/1
CAPSULE ORAL
Qty: 6 EACH | Refills: 3 | Status: CANCELLED | OUTPATIENT
Start: 2025-05-20

## 2025-05-20 RX ORDER — FLASH GLUCOSE SCANNING READER
EACH MISCELLANEOUS
Qty: 1 EACH | Refills: 0 | Status: SHIPPED | OUTPATIENT
Start: 2025-05-20

## 2025-05-20 RX ORDER — KETOROLAC TROMETHAMINE 30 MG/ML
1 INJECTION, SOLUTION INTRAMUSCULAR; INTRAVENOUS ONCE
Qty: 1 EACH | Refills: 0 | Status: CANCELLED | OUTPATIENT
Start: 2025-05-20 | End: 2025-05-20

## 2025-05-20 NOTE — TELEPHONE ENCOUNTER
Sent order for Freestyle Bernice 14 day  to [facility] in [place], [place]. Called pt's son Khang and let him know this was sent in and the pharmacy did confirm that they had this in stock. Asked Khang to call back if he continues to have any issues picking this up.    Jeffrey ROPER, RN  05/20/25 3:38 PM

## 2025-05-20 NOTE — TELEPHONE ENCOUNTER
Who is calling? Khang ( Son )   Medication name: Continuous Glucose  (FREESTYLE ANURAG 14 DAY READER) STEVIE   Is this a refill request? Yes  Have they contacted the pharmacy?  Yes  Pharmacy:   [facility] [address] [place] [address]  [address] [place] [id number]  Phone: [phone] Fax: [phone]   Question/Concern: They are out of town for the next couple weeks and her reader broke. Requesting a new reader as soon as possible since it has been a day since her last readings.   Would patient like a call back? Yes, he requests a call back once sent.

## 2025-05-20 NOTE — TELEPHONE ENCOUNTER
Pt's son called from parking lot at [facility] in  [place], [place].    His mother's (the patient) Freestyle Bernice 1 reader is broken and he needs a new one.   He asked how he could get a new reader that would work with the sensors they already have.   Called and spoke with pharmacist who advised calling an independent pharmacy to see if they have the first generation Bernice CGM [place].   The alternative would be to purchase Bernice 2 sensors and use a phone [place] as the reader.    JUDSON Moseley, RN  05/20/25, 3:43 PM

## 2025-06-18 ENCOUNTER — ANCILLARY PROCEDURE (OUTPATIENT)
Dept: CARDIOLOGY | Facility: CLINIC | Age: 85
End: 2025-06-18
Attending: INTERNAL MEDICINE
Payer: MEDICARE

## 2025-06-18 DIAGNOSIS — I44.30 ATRIOVENTRICULAR BLOCK: ICD-10-CM

## 2025-06-18 LAB
MDC_IDC_LEAD_CONNECTION_STATUS: NORMAL
MDC_IDC_LEAD_CONNECTION_STATUS: NORMAL
MDC_IDC_LEAD_IMPLANT_DT: NORMAL
MDC_IDC_LEAD_IMPLANT_DT: NORMAL
MDC_IDC_LEAD_LOCATION: NORMAL
MDC_IDC_LEAD_LOCATION: NORMAL
MDC_IDC_LEAD_LOCATION_DETAIL_1: NORMAL
MDC_IDC_LEAD_LOCATION_DETAIL_1: NORMAL
MDC_IDC_LEAD_MFG: NORMAL
MDC_IDC_LEAD_MFG: NORMAL
MDC_IDC_LEAD_MODEL: NORMAL
MDC_IDC_LEAD_MODEL: NORMAL
MDC_IDC_LEAD_POLARITY_TYPE: NORMAL
MDC_IDC_LEAD_POLARITY_TYPE: NORMAL
MDC_IDC_LEAD_SERIAL: NORMAL
MDC_IDC_LEAD_SERIAL: NORMAL
MDC_IDC_LEAD_SPECIAL_FUNCTION: NORMAL
MDC_IDC_LEAD_SPECIAL_FUNCTION: NORMAL
MDC_IDC_MSMT_BATTERY_DTM: NORMAL
MDC_IDC_MSMT_BATTERY_REMAINING_LONGEVITY: 101 MO
MDC_IDC_MSMT_BATTERY_RRT_TRIGGER: 2.62
MDC_IDC_MSMT_BATTERY_STATUS: NORMAL
MDC_IDC_MSMT_BATTERY_VOLTAGE: 2.99 V
MDC_IDC_MSMT_LEADCHNL_RA_IMPEDANCE_VALUE: 475 OHM
MDC_IDC_MSMT_LEADCHNL_RA_IMPEDANCE_VALUE: 494 OHM
MDC_IDC_MSMT_LEADCHNL_RA_PACING_THRESHOLD_AMPLITUDE: 0.5 V
MDC_IDC_MSMT_LEADCHNL_RA_PACING_THRESHOLD_PULSEWIDTH: 0.4 MS
MDC_IDC_MSMT_LEADCHNL_RA_SENSING_INTR_AMPL: 3.75 MV
MDC_IDC_MSMT_LEADCHNL_RV_IMPEDANCE_VALUE: 361 OHM
MDC_IDC_MSMT_LEADCHNL_RV_IMPEDANCE_VALUE: 456 OHM
MDC_IDC_MSMT_LEADCHNL_RV_PACING_THRESHOLD_AMPLITUDE: 0.88 V
MDC_IDC_MSMT_LEADCHNL_RV_PACING_THRESHOLD_PULSEWIDTH: 0.4 MS
MDC_IDC_MSMT_LEADCHNL_RV_SENSING_INTR_AMPL: 7.25 MV
MDC_IDC_PG_IMPLANT_DTM: NORMAL
MDC_IDC_PG_MFG: NORMAL
MDC_IDC_PG_MODEL: NORMAL
MDC_IDC_PG_SERIAL: NORMAL
MDC_IDC_PG_TYPE: NORMAL
MDC_IDC_SESS_CLINIC_NAME: NORMAL
MDC_IDC_SESS_DTM: NORMAL
MDC_IDC_SESS_TYPE: NORMAL
MDC_IDC_SET_BRADY_AT_MODE_SWITCH_RATE: 171 {BEATS}/MIN
MDC_IDC_SET_BRADY_HYSTRATE: NORMAL
MDC_IDC_SET_BRADY_LOWRATE: 60 {BEATS}/MIN
MDC_IDC_SET_BRADY_MAX_SENSOR_RATE: 120 {BEATS}/MIN
MDC_IDC_SET_BRADY_MAX_TRACKING_RATE: 120 {BEATS}/MIN
MDC_IDC_SET_BRADY_MODE: NORMAL
MDC_IDC_SET_BRADY_PAV_DELAY_LOW: 220 MS
MDC_IDC_SET_BRADY_SAV_DELAY_LOW: 200 MS
MDC_IDC_SET_LEADCHNL_RA_PACING_AMPLITUDE: 1.5 V
MDC_IDC_SET_LEADCHNL_RA_PACING_ANODE_ELECTRODE_1: NORMAL
MDC_IDC_SET_LEADCHNL_RA_PACING_ANODE_LOCATION_1: NORMAL
MDC_IDC_SET_LEADCHNL_RA_PACING_CAPTURE_MODE: NORMAL
MDC_IDC_SET_LEADCHNL_RA_PACING_CATHODE_ELECTRODE_1: NORMAL
MDC_IDC_SET_LEADCHNL_RA_PACING_CATHODE_LOCATION_1: NORMAL
MDC_IDC_SET_LEADCHNL_RA_PACING_POLARITY: NORMAL
MDC_IDC_SET_LEADCHNL_RA_PACING_PULSEWIDTH: 0.4 MS
MDC_IDC_SET_LEADCHNL_RA_SENSING_ANODE_ELECTRODE_1: NORMAL
MDC_IDC_SET_LEADCHNL_RA_SENSING_ANODE_LOCATION_1: NORMAL
MDC_IDC_SET_LEADCHNL_RA_SENSING_CATHODE_ELECTRODE_1: NORMAL
MDC_IDC_SET_LEADCHNL_RA_SENSING_CATHODE_LOCATION_1: NORMAL
MDC_IDC_SET_LEADCHNL_RA_SENSING_POLARITY: NORMAL
MDC_IDC_SET_LEADCHNL_RA_SENSING_SENSITIVITY: 0.3 MV
MDC_IDC_SET_LEADCHNL_RV_PACING_AMPLITUDE: 1.75 V
MDC_IDC_SET_LEADCHNL_RV_PACING_ANODE_ELECTRODE_1: NORMAL
MDC_IDC_SET_LEADCHNL_RV_PACING_ANODE_LOCATION_1: NORMAL
MDC_IDC_SET_LEADCHNL_RV_PACING_CAPTURE_MODE: NORMAL
MDC_IDC_SET_LEADCHNL_RV_PACING_CATHODE_ELECTRODE_1: NORMAL
MDC_IDC_SET_LEADCHNL_RV_PACING_CATHODE_LOCATION_1: NORMAL
MDC_IDC_SET_LEADCHNL_RV_PACING_POLARITY: NORMAL
MDC_IDC_SET_LEADCHNL_RV_PACING_PULSEWIDTH: 0.4 MS
MDC_IDC_SET_LEADCHNL_RV_SENSING_ANODE_ELECTRODE_1: NORMAL
MDC_IDC_SET_LEADCHNL_RV_SENSING_ANODE_LOCATION_1: NORMAL
MDC_IDC_SET_LEADCHNL_RV_SENSING_CATHODE_ELECTRODE_1: NORMAL
MDC_IDC_SET_LEADCHNL_RV_SENSING_CATHODE_LOCATION_1: NORMAL
MDC_IDC_SET_LEADCHNL_RV_SENSING_POLARITY: NORMAL
MDC_IDC_SET_LEADCHNL_RV_SENSING_SENSITIVITY: 0.9 MV
MDC_IDC_SET_ZONE_DETECTION_INTERVAL: 350 MS
MDC_IDC_SET_ZONE_DETECTION_INTERVAL: 400 MS
MDC_IDC_SET_ZONE_STATUS: NORMAL
MDC_IDC_SET_ZONE_STATUS: NORMAL
MDC_IDC_SET_ZONE_TYPE: NORMAL
MDC_IDC_SET_ZONE_VENDOR_TYPE: NORMAL
MDC_IDC_STAT_AT_BURDEN_PERCENT: 0.1 %
MDC_IDC_STAT_AT_DTM_END: NORMAL
MDC_IDC_STAT_AT_DTM_START: NORMAL
MDC_IDC_STAT_BRADY_AP_VP_PERCENT: 25.92 %
MDC_IDC_STAT_BRADY_AP_VS_PERCENT: 0 %
MDC_IDC_STAT_BRADY_AS_VP_PERCENT: 74.04 %
MDC_IDC_STAT_BRADY_AS_VS_PERCENT: 0.03 %
MDC_IDC_STAT_BRADY_DTM_END: NORMAL
MDC_IDC_STAT_BRADY_DTM_START: NORMAL
MDC_IDC_STAT_BRADY_RA_PERCENT_PACED: 25.92 %
MDC_IDC_STAT_BRADY_RV_PERCENT_PACED: 99.97 %
MDC_IDC_STAT_EPISODE_RECENT_COUNT: 0
MDC_IDC_STAT_EPISODE_RECENT_COUNT_DTM_END: NORMAL
MDC_IDC_STAT_EPISODE_RECENT_COUNT_DTM_START: NORMAL
MDC_IDC_STAT_EPISODE_TOTAL_COUNT: 0
MDC_IDC_STAT_EPISODE_TOTAL_COUNT: 265
MDC_IDC_STAT_EPISODE_TOTAL_COUNT: 3
MDC_IDC_STAT_EPISODE_TOTAL_COUNT_DTM_END: NORMAL
MDC_IDC_STAT_EPISODE_TOTAL_COUNT_DTM_START: NORMAL
MDC_IDC_STAT_EPISODE_TYPE: NORMAL
MDC_IDC_STAT_TACHYTHERAPY_RECENT_DTM_END: NORMAL
MDC_IDC_STAT_TACHYTHERAPY_RECENT_DTM_START: NORMAL
MDC_IDC_STAT_TACHYTHERAPY_TOTAL_DTM_END: NORMAL
MDC_IDC_STAT_TACHYTHERAPY_TOTAL_DTM_START: NORMAL

## 2025-06-18 PROCEDURE — 93296 REM INTERROG EVL PM/IDS: CPT

## 2025-06-18 PROCEDURE — 93294 REM INTERROG EVL PM/LDLS PM: CPT | Performed by: INTERNAL MEDICINE

## 2025-07-19 ENCOUNTER — HEALTH MAINTENANCE LETTER (OUTPATIENT)
Age: 85
End: 2025-07-19

## 2025-07-23 NOTE — TELEPHONE ENCOUNTER
PA for Qulipta 30MG tablets DENIED    Reason:    Message sent to office clinical pool Yes    Denial letter scanned into Media Yes    We can gladly do an appeal but the process can take about 30-60 days to provide determination. Please have the office staff schedule a Peer to Peer at phone [phone] . If an appeal is truly warranted please have Provider send clinical documentation to the PA department to support the appeal.     **Please follow up with your patient regarding denial and next steps**     Pt son calling asking for clarification of lisinopril-hydrochlorothiazide dose change at Ashley's appointment on Wednesday 4/23 with Dr. Velazquez. Reviewed prescription change and he confirms understanding. He also needs her CGM supplies sent to [facility] in [place], [place], so will plan to update order to new pharmacy.    Jeffrey ROPER, RN  04/28/25 12:21 PM